# Patient Record
Sex: MALE | Race: WHITE | NOT HISPANIC OR LATINO | Employment: OTHER | URBAN - METROPOLITAN AREA
[De-identification: names, ages, dates, MRNs, and addresses within clinical notes are randomized per-mention and may not be internally consistent; named-entity substitution may affect disease eponyms.]

---

## 2017-03-10 DIAGNOSIS — C49.9 SARCOMA (H): Primary | ICD-10-CM

## 2017-04-04 ENCOUNTER — OFFICE VISIT (OUTPATIENT)
Dept: ORTHOPEDICS | Facility: CLINIC | Age: 62
End: 2017-04-04

## 2017-04-04 VITALS — WEIGHT: 177 LBS | HEIGHT: 68 IN | BODY MASS INDEX: 26.83 KG/M2

## 2017-04-04 DIAGNOSIS — C49.9 MYXOID LIPOSARCOMA (H): Primary | ICD-10-CM

## 2017-04-04 PROBLEM — I10 HYPERTENSION: Status: ACTIVE | Noted: 2017-04-04

## 2017-04-04 RX ORDER — DEXTROAMPHETAMINE SULFATE 15 MG/1
15 CAPSULE, EXTENDED RELEASE ORAL
COMMUNITY
Start: 2016-11-25 | End: 2023-08-23

## 2017-04-04 RX ORDER — TADALAFIL 20 MG/1
20 TABLET ORAL
COMMUNITY
Start: 2016-06-01

## 2017-04-04 RX ORDER — FINASTERIDE 5 MG/1
1.25 TABLET, FILM COATED ORAL DAILY
Status: ON HOLD | COMMUNITY
Start: 2016-04-06 | End: 2022-02-15

## 2017-04-04 NOTE — NURSING NOTE
"Reason For Visit:   Chief Complaint   Patient presents with     Results     Pt is here today to F/U on Same day CT Scan per Tumor Protocal S/p Excision of Tumor Bed Left Medial Thigh DOS: 11/21/13                   Ht 1.715 m (5' 7.52\")  Wt 80.3 kg (177 lb)  BMI 27.3 kg/m2                               Current Outpatient Prescriptions   Medication     tadalafil (CIALIS) 20 MG tablet     finasteride (PROSCAR) 5 MG tablet     dextroamphetamine (DEXEDRINE SPANSULE) 15 MG 24 hr capsule     Cholecalciferol (VITAMIN D3) 1000 UNITS CAPS     aspirin 81 MG tablet     IBUPROFEN PO     VITAMIN E MTC PO     Omega-3 Fatty Acids (OMEGA-3 FISH OIL PO)     Mesalamine (PENTASA PO)     atorvastatin (LIPITOR) 10 MG tablet     No current facility-administered medications for this visit.        No Known Allergies    Rama Miles C.M.A.             "

## 2017-04-04 NOTE — LETTER
4/4/2017       RE: Flaco Willis III  [address] [place] [address]     Dear Colleague,    Thank you for referring your patient, Flaco Willis III, to the [facility] at [facility]. Please see a copy of my visit note below.    DIAGNOSIS: myxoid liposarcoma, left proximal medial thigh, subcutaneous     TREATMENT: 1. surgical excision with positive margins (outside hospital), preoperative radiation ([facility]/Dr Castelan)   2. Tumor bed excision (11/21/12)    HISTORY OF PRESENT ILLNESS:  Mr. Willis is seen in routine followup today, he has no complaints.      PHYSICAL EXAMINATION:  On exam, there is no evidence of local recurrent tumor in the left adductor region or the left groin.      IMAGING:  Chest CT scan was reviewed by myself and I see no evidence of metastasis.  We will await the radiologist's final interpretation.      IMPRESSION:  Overall doing well.      PLAN:  He will be seen back for a final almost 5 year followup in 1 year.       Again, thank you for allowing me to participate in the care of your patient.      Sincerely,    Seymour Jesus MD

## 2017-04-04 NOTE — MR AVS SNAPSHOT
"              After Visit Summary   2017    Flaco Willis III    MRN: [id number]           Patient Information     Date Of Birth          1955        Visit Information        Provider Department      2017 4:00 PM Seymour Jesus MD [facility]        Today's Diagnoses     Myxoid liposarcoma (H)    -  1       Follow-ups after your visit        Who to contact     Please call your clinic at [phone] to:    Ask questions about your health    Make or cancel appointments    Discuss your medicines    Learn about your test results    Speak to your doctor   If you have compliments or concerns about an experience at your clinic, or if you wish to file a complaint, please contact [facility] Patient Relations at [phone] or email us at [email or web address]         Additional Information About Your Visit        MyChart Information     MTailor is an electronic gateway that provides easy, online access to your medical records. With MTailor, you can request a clinic appointment, read your test results, renew a prescription or communicate with your care team.     To sign up for Sellboxt visit the website at [email or web address]   You will be asked to enter the access code listed below, as well as some personal information. Please follow the directions to create your username and password.     Your access code is: [email or web address]  Expires: 2017  6:30 AM     Your access code will  in 90 days. If you need help or a new code, please contact your [facility] or call [phone] for assistance.        Care EveryWhere ID     This is your Care EveryWhere ID. This could be used by other organizations to access your [place] medical records  [id number]        Your Vitals Were     Height BMI (Body Mass Index)                1.715 m (5' 7.52\") 27.3 kg/m2           Blood Pressure from Last 3 Encounters:   13 " 118/83   09/21/13 (!) 142/98    Weight from Last 3 Encounters:   04/04/17 80.3 kg (177 lb)   03/22/16 78 kg (172 lb)   09/23/15 75.8 kg (167 lb)              Today, you had the following     No orders found for display         Today's Medication Changes          These changes are accurate as of: 4/4/17 11:59 PM.  If you have any questions, ask your nurse or doctor.               These medicines have changed or have updated prescriptions.        Dose/Directions    aspirin 81 MG tablet   This may have changed:  Another medication with the same name was removed. Continue taking this medication, and follow the directions you see here.   Changed by:  Seymour Jesus MD        Dose:  81 mg   Take 81 mg by mouth   Refills:  0         Stop taking these medicines if you haven't already. Please contact your care team if you have questions.     PROPECIA PO   Stopped by:  Seymour Jesus MD                    Primary Care Provider Office Phone # Fax #    Darnell Cui [phone]       [facility] [address]  [facility] [address]        Thank you!     Thank you for choosing [facility]  for your care. Our goal is always to provide you with excellent care. Hearing back from our patients is one way we can continue to improve our services. Please take a few minutes to complete the written survey that you may receive in the mail after your visit with us. Thank you!             Your Updated Medication List - Protect others around you: Learn how to safely use, store and throw away your medicines at www.disposemymeds.org.          This list is accurate as of: 4/4/17 11:59 PM.  Always use your most recent med list.                   Brand Name Dispense Instructions for use    aspirin 81 MG tablet      Take 81 mg by mouth       atorvastatin 10 MG tablet    LIPITOR     Take 10 mg by mouth At Bedtime       CIALIS 20 MG tablet   Generic drug:  tadalafil      Take 20 mg by mouth        dextroamphetamine 15 MG 24 hr capsule    DEXEDRINE SPANSULE     Take 15 mg by mouth       finasteride 5 MG tablet    PROSCAR     Take 1.25 mg by mouth       IBUPROFEN PO      Take 400 mg by mouth as needed for moderate pain       OMEGA-3 FISH OIL PO          PENTASA PO          vitamin D3 1000 UNITS Caps      Take 1,000 Units by mouth       VITAMIN E MTC PO

## 2017-04-04 NOTE — PROGRESS NOTES
DIAGNOSIS: myxoid liposarcoma, left proximal medial thigh, subcutaneous     TREATMENT: 1. surgical excision with positive margins (outside hospital), preoperative radiation ([facility]/Dr Castelan)   2. Tumor bed excision (11/21/12)    HISTORY OF PRESENT ILLNESS:  Mr. Willis is seen in routine followup today, he has no complaints.      PHYSICAL EXAMINATION:  On exam, there is no evidence of local recurrent tumor in the left adductor region or the left groin.      IMAGING:  Chest CT scan was reviewed by myself and I see no evidence of metastasis.  We will await the radiologist's final interpretation.      IMPRESSION:  Overall doing well.      PLAN:  He will be seen back for a final almost 5 year followup in 1 year.

## 2018-06-04 ENCOUNTER — TELEPHONE (OUTPATIENT)
Dept: ORTHOPEDICS | Facility: CLINIC | Age: 63
End: 2018-06-04

## 2018-06-04 NOTE — TELEPHONE ENCOUNTER
Patient called requesting scan be scheduled and also appointment with dr. kelly following scan.    Email was sent to alysia requesting task be taken care of.    Jailene rogel

## 2018-06-05 DIAGNOSIS — C49.9 SARCOMA OF SOFT TISSUE (H): Primary | ICD-10-CM

## 2018-07-10 ENCOUNTER — RADIANT APPOINTMENT (OUTPATIENT)
Dept: CT IMAGING | Facility: CLINIC | Age: 63
End: 2018-07-10
Attending: ORTHOPAEDIC SURGERY
Payer: COMMERCIAL

## 2018-07-10 ENCOUNTER — OFFICE VISIT (OUTPATIENT)
Dept: ORTHOPEDICS | Facility: CLINIC | Age: 63
End: 2018-07-10
Payer: COMMERCIAL

## 2018-07-10 VITALS — BODY MASS INDEX: 26.34 KG/M2 | WEIGHT: 170.8 LBS

## 2018-07-10 DIAGNOSIS — C49.9 SARCOMA OF SOFT TISSUE (H): ICD-10-CM

## 2018-07-10 DIAGNOSIS — C49.9 LIPOSARCOMA (H): Primary | ICD-10-CM

## 2018-07-10 PROBLEM — F98.8 ADD (ATTENTION DEFICIT DISORDER): Status: ACTIVE | Noted: 2017-06-28

## 2018-07-10 RX ORDER — ZOLPIDEM TARTRATE 10 MG/1
10 TABLET ORAL
Status: ON HOLD | COMMUNITY
Start: 2018-04-05 | End: 2022-02-18

## 2018-07-10 ASSESSMENT — ENCOUNTER SYMPTOMS: NERVOUS/ANXIOUS: 1

## 2018-07-10 NOTE — LETTER
7/10/2018       RE: Flaco Willis III  [address] [place] [address]     Dear Colleague,    Thank you for referring your patient, Flaco Willis III, to the [facility] at [facility]. Please see a copy of my visit note below.    DIAGNOSIS: myxoid liposarcoma, left proximal medial thigh, subcutaneous      TREATMENT: 1. surgical excision with positive margins (outside hospital), preoperative radiation ([facility]/Dr Castelan)   2. Tumor bed excision (11/21/12)    Patient is seen today for his 5 year follow-up.  No complaints.  He denies any changes in the left medial thigh.    On physical examination left medial thigh is supple with a healed wound and no evidence of recurrent masses.    Chest CT scan to my review shows no evidence of metastasis.    Impression: Overall doing well now 5 years post treatment of the soft tissue sarcoma.    Plan: 1. I discussed with Flaco the possibility of stopping his follow-up at this point to continue with annual chest x-rays for up to 5 additional years.  He denies preferred the latter.  2.  He will have follow-up annual chest x-ray for 5 years either at the emergency room physician's office.            Again, thank you for allowing me to participate in the care of your patient.      Sincerely,    Seymour Jesus MD

## 2018-07-10 NOTE — NURSING NOTE
Chief Complaint   Patient presents with     Results     F/u Same day Chest CT S/p Excision of Tumor Bed Left Medial Thigh DOS: 11/12/2013       63 year old  1955    Wt 77.5 kg (170 lb 12.8 oz)  BMI 26.34 kg/m2          [employer] [facility] [address] [place], [place] - [address] AT [facility] [address]    No Known Allergies  Current Outpatient Prescriptions   Medication     aspirin 81 MG tablet     atorvastatin (LIPITOR) 10 MG tablet     Cholecalciferol (VITAMIN D3) 1000 UNITS CAPS     dextroamphetamine (DEXEDRINE SPANSULE) 15 MG 24 hr capsule     finasteride (PROSCAR) 5 MG tablet     IBUPROFEN PO     Mesalamine (PENTASA PO)     Omega-3 Fatty Acids (FISH OIL PO)     Omega-3 Fatty Acids (OMEGA-3 FISH OIL PO)     tadalafil (CIALIS) 20 MG tablet     TADALAFIL PO     VITAMIN E MTC PO     zolpidem (AMBIEN) 10 MG tablet     No current facility-administered medications for this visit.

## 2018-07-10 NOTE — PROGRESS NOTES
DIAGNOSIS: myxoid liposarcoma, left proximal medial thigh, subcutaneous      TREATMENT: 1. surgical excision with positive margins (outside hospital), preoperative radiation ([facility]/Dr Castelan)   2. Tumor bed excision (11/21/12)    Patient is seen today for his 5 year follow-up.  No complaints.  He denies any changes in the left medial thigh.    On physical examination left medial thigh is supple with a healed wound and no evidence of recurrent masses.    Chest CT scan to my review shows no evidence of metastasis.    Impression: Overall doing well now 5 years post treatment of the soft tissue sarcoma.    Plan: 1. I discussed with Flaco the possibility of stopping his follow-up at this point to continue with annual chest x-rays for up to 5 additional years.  He denies preferred the latter.  2.  He will have follow-up annual chest x-ray for 5 years either at the emergency room physician's office.

## 2018-12-05 ENCOUNTER — PATIENT OUTREACH (OUTPATIENT)
Dept: CARE COORDINATION | Facility: CLINIC | Age: 63
End: 2018-12-05

## 2018-12-07 DIAGNOSIS — C49.9 SARCOMA (H): Primary | ICD-10-CM

## 2018-12-14 ENCOUNTER — OFFICE VISIT (OUTPATIENT)
Dept: ORTHOPEDICS | Facility: CLINIC | Age: 63
End: 2018-12-14
Payer: COMMERCIAL

## 2018-12-14 ENCOUNTER — TELEPHONE (OUTPATIENT)
Dept: ORTHOPEDICS | Facility: CLINIC | Age: 63
End: 2018-12-14

## 2018-12-14 DIAGNOSIS — C49.22 SOFT TISSUE SARCOMA OF LOWER EXTREMITY, LEFT (H): Primary | ICD-10-CM

## 2018-12-14 NOTE — PROGRESS NOTES
DIAGNOSIS: myxoid liposarcoma, left proximal medial thigh, subcutaneous      TREATMENT: 1. surgical excision with positive margins (outside hospital), preoperative radiation ([facility]/Dr Castelan)   2. Tumor bed excision (11/21/12)    Flaco is seen today as an unscheduled visit.  He is concerned seen by his tumor bed.  He has been reading certain information recently.  He is also had a family member who has struggled with metastatic sarcoma.  He reports concerns about tightness while performing yoga in the left thigh.  Through his palpation of the wound he is concerned about a local recurrence.    Physical exam he has obvious soft tissue defect over the site of tumor bed excision.  He has no obvious recurrent tumor though he does have some fullness just distal to the incision.    I answered all of Kennedy questions about his treatment and the nature of the surgery as well as the nature number.  What he can and cannot do.  He seemed satisfied with these explanations.    Impression: Patient suspects recurrence and has new symptoms in the left thigh.    Plan: MRI scan with the marker.  Will call patient with results.    Patient was seen for 15 minutes and greater than 10 minutes spent answering questions coordinating his care.

## 2018-12-14 NOTE — TELEPHONE ENCOUNTER
[facility] Call Center    Phone Message    May a detailed message be left on voicemail: yes    Reason for Call: Order(s)Is this a MRI of Femur and Thigh w/ w/o contrast or is this an arthrogram?  Reason for requested: Order received not clear  Date needed: Fax [phone] ASAP  Provider name: Dr. Jesus      Action Taken: Message routed to:  [facility] ump ortho

## 2018-12-14 NOTE — NURSING NOTE
Chief Complaint   Patient presents with     RECHECK     Pt. states that he is here today for increased pain in his surgical site. Pt. states that he does do some intense yoga stretches and may have some muscle pain.        63 year old  1955          [facility] - [place], [place] - [address] AT [facility] [address]    No Known Allergies    Current Outpatient Medications   Medication     aspirin 81 MG tablet     atorvastatin (LIPITOR) 10 MG tablet     Cholecalciferol (VITAMIN D3) 1000 UNITS CAPS     dextroamphetamine (DEXEDRINE SPANSULE) 15 MG 24 hr capsule     finasteride (PROSCAR) 5 MG tablet     IBUPROFEN PO     Mesalamine (PENTASA PO)     Omega-3 Fatty Acids (FISH OIL PO)     Omega-3 Fatty Acids (OMEGA-3 FISH OIL PO)     tadalafil (CIALIS) 20 MG tablet     TADALAFIL PO     VITAMIN E MTC PO     zolpidem (AMBIEN) 10 MG tablet     No current facility-administered medications for this visit.

## 2018-12-14 NOTE — LETTER
12/14/2018       RE: Flaco Willis III  [address] [place] [address]     Dear Colleague,    Thank you for referring your patient, Flaco Willis III, to the [facility] at [facility]. Please see a copy of my visit note below.    Chief Complaint:    HPI:     Physical Exam:    Imaging:    Impression:    Plan:    DIAGNOSIS: myxoid liposarcoma, left proximal medial thigh, subcutaneous      TREATMENT: 1. surgical excision with positive margins (outside hospital), preoperative radiation ([facility]/Dr Castelan)   2. Tumor bed excision (11/21/12)    Flaco is seen today as an unscheduled visit.  He is concerned seen by his tumor bed.  He has been reading certain information recently.  He is also had a family member who has struggled with metastatic sarcoma.  He reports concerns about tightness while performing yoga in the left thigh.  Through his palpation of the wound he is concerned about a local recurrence.    Physical exam he has obvious soft tissue defect over the site of tumor bed excision.  He has no obvious recurrent tumor though he does have some fullness just distal to the incision.    I answered all of Kennedy questions about his treatment and the nature of the surgery as well as the nature number.  What he can and cannot do.  He seemed satisfied with these explanations.    Impression: Patient suspects recurrence and has new symptoms in the left thigh.    Plan: MRI scan with the marker.  Will call patient with results.    Patient was seen for 15 minutes and greater than 10 minutes spent answering questions coordinating his care.    Again, thank you for allowing me to participate in the care of your patient.      Sincerely,    Seymour Jesus MD

## 2018-12-20 ENCOUNTER — TRANSFERRED RECORDS (OUTPATIENT)
Dept: HEALTH INFORMATION MANAGEMENT | Facility: CLINIC | Age: 63
End: 2018-12-20

## 2019-01-25 ENCOUNTER — TELEPHONE (OUTPATIENT)
Dept: ORTHOPEDICS | Facility: CLINIC | Age: 64
End: 2019-01-25

## 2019-01-26 NOTE — TELEPHONE ENCOUNTER
Spoke with Teto vogel: the results of his recent MRI scan. It showed no recurrent tumor in his right thigh.  He will return to see Dr. Jesus this summer with a new chest xray.

## 2021-11-18 ENCOUNTER — TRANSFERRED RECORDS (OUTPATIENT)
Dept: HEALTH INFORMATION MANAGEMENT | Facility: CLINIC | Age: 66
End: 2021-11-18
Payer: COMMERCIAL

## 2021-11-22 ENCOUNTER — TELEPHONE (OUTPATIENT)
Dept: ORTHOPEDICS | Facility: CLINIC | Age: 66
End: 2021-11-22
Payer: COMMERCIAL

## 2021-11-22 NOTE — TELEPHONE ENCOUNTER
[facility] Call Center    Phone Message:  Pt is a previous pt of MD Ann Marie.  Pt has found a NEW LUMP (pt stated) and would like someone from the team to CALL him BACK, in regards to his questions and concern.    May a detailed message be left on voicemail: Yes     Reason for Call: Other: Questions About A LUMP:  Call Back  (New Discovery of LUMP)    Action Taken: Message routed to:  [facility] Team    Travel Screening: Not Applicable

## 2021-11-23 NOTE — TELEPHONE ENCOUNTER
Called pt multiple times to gather more information. Will try again.         -Ricco, ATC- Orthopedics

## 2021-11-23 NOTE — TELEPHONE ENCOUNTER
[facility]    Phone Message    May a detailed message be left on voicemail: yes     Reason for Call: Other: Patient calling, stating that he is just looking for Dr Jesus's opinion. He has seen an ENT for lump on Neck. The ENT would like to do a needle biopsy. Patient is wanting to know what Dr Jesus thinks, given patient histoy. Would appreciate a call back.       Action Taken: Message routed to:  [facility] Ortho    Travel Screening: Not Applicable

## 2021-11-24 ENCOUNTER — TELEPHONE (OUTPATIENT)
Dept: ORTHOPEDICS | Facility: CLINIC | Age: 66
End: 2021-11-24
Payer: COMMERCIAL

## 2021-11-24 NOTE — TELEPHONE ENCOUNTER
RN called and left voice messge for Teto.  Please do biopsy with ENT.  If we need to see you we certainly will if it comes back same as your other tumor.  Either way, please call us and let us know what it is.

## 2021-11-24 NOTE — TELEPHONE ENCOUNTER
RN called and left voice message for Teto.  See below.  Please call and let us know what they found.  Please do biopsy with ENT.

## 2021-12-08 ENCOUNTER — TRANSFERRED RECORDS (OUTPATIENT)
Dept: HEALTH INFORMATION MANAGEMENT | Facility: CLINIC | Age: 66
End: 2021-12-08
Payer: COMMERCIAL

## 2021-12-29 ENCOUNTER — HOSPITAL ENCOUNTER (EMERGENCY)
Facility: CLINIC | Age: 66
Discharge: LEFT BEFORE DISCHARGE | End: 2021-12-29
Admitting: EMERGENCY MEDICINE
Payer: COMMERCIAL

## 2021-12-29 VITALS
RESPIRATION RATE: 18 BRPM | HEART RATE: 100 BPM | TEMPERATURE: 98.8 F | OXYGEN SATURATION: 98 % | SYSTOLIC BLOOD PRESSURE: 198 MMHG | DIASTOLIC BLOOD PRESSURE: 117 MMHG

## 2021-12-29 LAB
ALBUMIN SERPL-MCNC: 4.2 G/DL (ref 3.4–5)
ALP SERPL-CCNC: 85 U/L (ref 40–150)
ALT SERPL W P-5'-P-CCNC: 31 U/L (ref 0–70)
ANION GAP SERPL CALCULATED.3IONS-SCNC: 7 MMOL/L (ref 3–14)
AST SERPL W P-5'-P-CCNC: 14 U/L (ref 0–45)
BASOPHILS # BLD AUTO: 0.1 10E3/UL (ref 0–0.2)
BASOPHILS NFR BLD AUTO: 1 %
BILIRUB SERPL-MCNC: 1.2 MG/DL (ref 0.2–1.3)
BUN SERPL-MCNC: 11 MG/DL (ref 7–30)
CALCIUM SERPL-MCNC: 9 MG/DL (ref 8.5–10.1)
CHLORIDE BLD-SCNC: 108 MMOL/L (ref 94–109)
CO2 SERPL-SCNC: 22 MMOL/L (ref 20–32)
CREAT SERPL-MCNC: 0.82 MG/DL (ref 0.66–1.25)
EOSINOPHIL # BLD AUTO: 0.1 10E3/UL (ref 0–0.7)
EOSINOPHIL NFR BLD AUTO: 1 %
ERYTHROCYTE [DISTWIDTH] IN BLOOD BY AUTOMATED COUNT: 12 % (ref 10–15)
GFR SERPL CREATININE-BSD FRML MDRD: >90 ML/MIN/1.73M2
GLUCOSE BLD-MCNC: 119 MG/DL (ref 70–99)
HCT VFR BLD AUTO: 50.5 % (ref 40–53)
HGB BLD-MCNC: 17.3 G/DL (ref 13.3–17.7)
HOLD SPECIMEN: NORMAL
IMM GRANULOCYTES # BLD: 0 10E3/UL
IMM GRANULOCYTES NFR BLD: 0 %
LIPASE SERPL-CCNC: 118 U/L (ref 73–393)
LYMPHOCYTES # BLD AUTO: 1 10E3/UL (ref 0.8–5.3)
LYMPHOCYTES NFR BLD AUTO: 13 %
MCH RBC QN AUTO: 31.6 PG (ref 26.5–33)
MCHC RBC AUTO-ENTMCNC: 34.3 G/DL (ref 31.5–36.5)
MCV RBC AUTO: 92 FL (ref 78–100)
MONOCYTES # BLD AUTO: 0.8 10E3/UL (ref 0–1.3)
MONOCYTES NFR BLD AUTO: 10 %
NEUTROPHILS # BLD AUTO: 6.1 10E3/UL (ref 1.6–8.3)
NEUTROPHILS NFR BLD AUTO: 75 %
NRBC # BLD AUTO: 0 10E3/UL
NRBC BLD AUTO-RTO: 0 /100
PLATELET # BLD AUTO: 225 10E3/UL (ref 150–450)
POTASSIUM BLD-SCNC: 3.7 MMOL/L (ref 3.4–5.3)
PROT SERPL-MCNC: 7.8 G/DL (ref 6.8–8.8)
RBC # BLD AUTO: 5.47 10E6/UL (ref 4.4–5.9)
SODIUM SERPL-SCNC: 137 MMOL/L (ref 133–144)
TROPONIN I SERPL HS-MCNC: 9 NG/L
WBC # BLD AUTO: 8.1 10E3/UL (ref 4–11)

## 2021-12-29 PROCEDURE — 36415 COLL VENOUS BLD VENIPUNCTURE: CPT | Performed by: EMERGENCY MEDICINE

## 2021-12-29 PROCEDURE — 85004 AUTOMATED DIFF WBC COUNT: CPT | Performed by: EMERGENCY MEDICINE

## 2021-12-29 PROCEDURE — 80053 COMPREHEN METABOLIC PANEL: CPT | Performed by: EMERGENCY MEDICINE

## 2021-12-29 PROCEDURE — 999N000104 HC STATISTIC NO CHARGE

## 2021-12-29 PROCEDURE — 84484 ASSAY OF TROPONIN QUANT: CPT | Performed by: EMERGENCY MEDICINE

## 2021-12-29 PROCEDURE — 83690 ASSAY OF LIPASE: CPT | Performed by: EMERGENCY MEDICINE

## 2021-12-29 NOTE — ED TRIAGE NOTES
"\"Lump on neck they can radiate it, they want to do a pet scan, to make sure there's not cancer al over of the body. now my stomach under my rib cage it hurts, they put me on BuSpar and Ativan, I googled it and they said they don't take with acholol, maybe because all the stress I think I developed an ulcer, I am super wand up\"    "

## 2021-12-31 ENCOUNTER — TELEPHONE (OUTPATIENT)
Dept: ORTHOPEDICS | Facility: CLINIC | Age: 66
End: 2021-12-31
Payer: COMMERCIAL

## 2021-12-31 NOTE — TELEPHONE ENCOUNTER
[facility] Call [facility]    Phone Message    May a detailed message be left on voicemail: yes     Reason for Call: Other: Patient is returning Dixies call, please call Edward back when you are able.      Action Taken: Message routed to:  [facility] Ortho    Travel Screening: Not Applicable

## 2022-01-04 ENCOUNTER — TRANSFERRED RECORDS (OUTPATIENT)
Dept: HEALTH INFORMATION MANAGEMENT | Facility: CLINIC | Age: 67
End: 2022-01-04
Payer: COMMERCIAL

## 2022-01-07 ENCOUNTER — TELEPHONE (OUTPATIENT)
Dept: ORTHOPEDICS | Facility: CLINIC | Age: 67
End: 2022-01-07

## 2022-01-07 NOTE — TELEPHONE ENCOUNTER
"Spoke with patient, Ermelinda RN is out of the office this week.  Patient had a leg biopsy and treatment about 10 years ago with Dr Jesus and nurse Longoria.    Recently he had a soft tissue growth on his neck and this was biopsied at [facility] and it came back positive for cancer.  They told him it is at stage 1 and as a part of the treatment plan with Dr Stewart he will have radiation and a few treatments of chemotherapy.    He had a PET scan this week on Tuesday and just had the results today and stated it was \"good news\".  He is calling to see if there is any way that Dr Jesus would be willing to make sure he is doing the right thing.  He knows the team at [facility] is fantastic, but he always trusted Dr Jesus and he really wants to make sure he is double checking about this.    He is comfortable with RN sending message to nurse Wadsworth.  He stated that he had spoken with Ermelinda at end of 2021 about this so she was aware at the time. He will continue with treatment plan at [facility] for now. Sandhya Beverly RN    "

## 2022-01-07 NOTE — TELEPHONE ENCOUNTER
[facility] Call [facility]    Phone Message    May a detailed message be left on voicemail: yes     Reason for Call: Other: Patient would like a call back from Ermelinda to discuss an current person issue, and would like Dr Jesus's recommendation      Action Taken: Message routed to:  [facility] Ortho    Travel Screening: Not Applicable

## 2022-01-10 NOTE — TELEPHONE ENCOUNTER
RN returned call to Teto.  He is wanting the opinion of Dr. Jesus on treatment for his newly diagnosed:     Final Diagnosis     Left neck lymph node, ultrasound-guided core biopsy with touch preparation-Positive for malignancy, favor p16+ metastatic squamous cell carcinoma (see comment).     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   CISplatin in sodium chloride 0.9 % IV infusion

## 2022-01-12 ENCOUNTER — TELEPHONE (OUTPATIENT)
Dept: ORTHOPEDICS | Facility: CLINIC | Age: 67
End: 2022-01-12
Payer: COMMERCIAL

## 2022-01-12 NOTE — TELEPHONE ENCOUNTER
----- Message from Seymour Jesus MD sent at 1/10/2022  3:10 PM CST -----  I trust the recommendation of the doctors at [facility]. I am not exert in this area. If he has doubts we can set up a second opinion for him.  Seymour  ----- Message -----  From: Ermelinda Farfan, RN  Sent: 1/10/2022   1:24 PM CST  To: Seymour Jesus MD      RN returned call to Teto.  He is wanting the opinion of Dr. Jesus on treatment for his newly diagnosed:     Cut and pasted from Care Everywhere   [facility]  Final Diagnosis    Left neck lymph node, ultrasound-guided core biopsy with touch preparation-Positive for malignancy, favor p16+ metastatic squamous cell carcinoma (see comment).    Current Oncology Plans  [facility], Dr. 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   CISplatin in sodium chloride 0.9 % IV infusion      He is wanting your thoughts on this.  Does chemo sound reasonable, he said 3 rounds just because he was on the cusp, and Radiation.    Thanks  Ermelinda

## 2022-01-14 ENCOUNTER — TRANSCRIBE ORDERS (OUTPATIENT)
Dept: OTHER | Age: 67
End: 2022-01-14
Payer: COMMERCIAL

## 2022-01-14 DIAGNOSIS — C43.4 MELANOMA OF NECK (H): Primary | ICD-10-CM

## 2022-01-17 ENCOUNTER — PATIENT OUTREACH (OUTPATIENT)
Dept: ONCOLOGY | Facility: CLINIC | Age: 67
End: 2022-01-17
Payer: COMMERCIAL

## 2022-01-17 NOTE — PROGRESS NOTES
I called Teto and left him a detailed vm.  He had called in as a self referral asking to consult with Dr. Pack.  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 Pack as I know she does not typically consult with H&N cancer patients.  Her response which I left on Teto's vm:  I don't see head and necks, Dr Friedman would see head and neck and better yet Dr Malik deals with melanoma patients so im sure either or. But tell the patient his friends  are sly unfortunately thatis not my area of expertise     I also asked Teto if he has ever been seen by ENT and was told surgery is not an option.  I would also like to recommend he see one of our ENT doctors first.  I asked if he could please call me to discuss his case further and I can help him with any appointment scheduling needs.  My contact information was left.

## 2022-01-17 NOTE — PROGRESS NOTES
Teto returned my call.  He clarified a few things for me:  1.  He has seen an ENT:  ~[facility]  ~NO surgery recommended.    2.  He would like to see one of the doctors that Dr. Pack suggested.  He lives in [place] so wants something close by, if possible.  I have him scheduled with Dr. Friedman.    3.  Confirmed he has a tonsil cancer.  Per records appears SCC.    New Patient Oncology Nurse Navigator Note     Referring provider: self referral & Dr. Pack     Referring Clinic/Organization: self     Referred to (specialty): medical oncology    Requested provider (if applicable): Dr. Friedman     Date Referral Received: 1/14/2022     Evaluation for : tonsil ca     Clinical History (per Nurse review of records provided):    11/16/2021:  CT soft tissue neck--[facility]  12/8/2021:  US neck/head soft tissue--[facility]  12/8/2021:  US  Biopsy soft tissue/muscle-[facility]  Final Diagnosis     Left neck lymph node, ultrasound-guided core biopsy with touch preparation-Positive for malignancy, favor p16+ metastatic squamous cell carcinoma (see comment).   Electronically signed by José Antonio Caba Jr., MD on 12/9/2021 at 12:51 PM   Comment     The cytologic touch preps are positive for malignancy. Only a tiny fragment of malignancy remains on the tissue core biopsies and this focus appears positive for P 40 and P 16, suggesting metastatic P 16 positive squamous cell carcinoma. Due to the very limited malignant material on the core biopsy, this finding should be interpreted with caution and correlated clinically.     1/4/2022:  CT Chest/Thorax-[facility]  1/4/2022:  PET CT-[facility]      1/6/2022:  Sommer Schulte-Medical Oncology  [facility]    Clinical Assessment / Barriers to Care (Per Nurse): none     Records Location (Care Everywhere, Media, etc.): [facility], [facility] ([facility] and [facility])     Records Needed:   [facility]:  Dr. Jermaine Saucedo  consult note  2.  Imaging pushed to PACS:  11/16/2021:  CT soft tissue neck--[facility]  12/8/2021:  US neck/head soft tissue--[facility]  12/8/2021:  US  Biopsy soft tissue/muscle  1/4/2022:  CT Chest/Thorax-[facility]  1/4/2022:  PET CT-[facility]         Additional testing needed prior to consult: none

## 2022-01-18 NOTE — TELEPHONE ENCOUNTER
RECORDS STATUS - ALL OTHER DIAGNOSIS      RECORDS RECEIVED FROM: Jignesh [facility], [facility], [facility]   DATE RECEIVED:    NOTES STATUS DETAILS   OFFICE NOTE from referring provider SELF    OFFICE NOTE from medical oncologist     OFFICE NOTE from other specialist External: Renetta DAMIAN 21DrCaitlin Jermaineeugenio Saucedo   DISCHARGE SUMMARY from hospital     DISCHARGE REPORT from the ER     OPERATIVE REPORT     MEDICATION LIST [facility]- [facility]    CLINICAL TRIAL TREATMENTS TO DATE     LABS     PATHOLOGY REPORTS Slides req  [facility]- [facility]  [employer] 21: [id number]   ANYTHING RELATED TO DIAGNOSIS [facility]- [facility] Most recent labs 22   GENONOMIC TESTING     TYPE:     IMAGING (NEED IMAGES & REPORT)     CT SCANS PACS 21: CT Soft Tissue Neck   MRI     MAMMO     ULTRASOUND PACS 21: US Neck/ Head soft tissue  21: US Biopsy soft tissue/muscle   PET       Action 2022 9:44 AM ABT   Action Taken Called and unable to speak to patient in regards to MAT for [facility]. [facility] for call back    10:50 AM  Patient called back and MAT was sent to [email or web address]    12:30 PM  MAT from patient received and sent with records request to [facility]. Images from [facility] and [facility] received and resolved to PACS    2:52 PM  Records from [facility] received and sent to HIM for upload     Action 2022 4:26 PM ABT   Action Taken Sent over request to [facility] for 22 PET Tumor Eval

## 2022-01-19 ENCOUNTER — VIRTUAL VISIT (OUTPATIENT)
Dept: ONCOLOGY | Facility: CLINIC | Age: 67
End: 2022-01-19
Attending: INTERNAL MEDICINE
Payer: COMMERCIAL

## 2022-01-19 ENCOUNTER — PRE VISIT (OUTPATIENT)
Dept: ONCOLOGY | Facility: CLINIC | Age: 67
End: 2022-01-19
Payer: COMMERCIAL

## 2022-01-19 DIAGNOSIS — C10.9 MALIGNANT NEOPLASM OF OROPHARYNX (H): Primary | ICD-10-CM

## 2022-01-19 DIAGNOSIS — C49.9 LIPOSARCOMA (H): ICD-10-CM

## 2022-01-19 PROCEDURE — 99205 OFFICE O/P NEW HI 60 MIN: CPT | Mod: 95 | Performed by: INTERNAL MEDICINE

## 2022-01-19 PROCEDURE — G0463 HOSPITAL OUTPT CLINIC VISIT: HCPCS | Mod: PN,RTG | Performed by: INTERNAL MEDICINE

## 2022-01-19 RX ORDER — FINASTERIDE 1 MG/1
1 TABLET, FILM COATED ORAL DAILY
COMMUNITY
Start: 2021-09-29

## 2022-01-19 RX ORDER — LORAZEPAM 0.5 MG/1
.5-1 TABLET ORAL
Status: ON HOLD | COMMUNITY
Start: 2022-01-14 | End: 2022-02-18

## 2022-01-19 RX ORDER — BUSPIRONE HYDROCHLORIDE 10 MG/1
10 TABLET ORAL 2 TIMES DAILY
COMMUNITY
Start: 2021-12-30

## 2022-01-19 RX ORDER — ATORVASTATIN CALCIUM 20 MG/1
20 TABLET, FILM COATED ORAL DAILY
COMMUNITY
Start: 2021-12-28

## 2022-01-19 NOTE — LETTER
1/19/2022         RE: Flaco Willis III  [address] [place] [address]        Dear Colleague,    Thank you for referring your patient, Flaco Willis III, to the [facility]. Please see a copy of my visit note below.     [facility]    NEW PATIENT VISIT NOTE    PATIENT NAME: Flaco Willis III MRN # [id number]  DATE OF VISIT: January 19, 2022 YOB: 1955    REFERRING PROVIDER: Referred Jeffery, MD  No address on file    CANCER TYPE: HPV+ve oropharyngeal cancer       HISTORY OF PRESENT ILLNESS     He notes that he has been good health. He had a little lump by inside groin. He was found to have cancer which was resected by Dr. David Jesus. He was monitored for 5 yrs and discharged. He had a normal physical exam last September. He had been a weekend smoker in the past. He drinks beer on weekends. He recently noted a lump in his left neck. He presented to his PCP. He was referred to ENT surgery. It was hard to get in to ENT clinic. He had a CT scan which revealed lymph node enlargement. He had needle biopsy which revealed HPV +ve squamous cell cancer. He had a follow up PET-CT scan done which revealed focal FDG uptake within the left palatine tonsil without a discrete CT correlate in addition to enlarged FDG avid left level 2A cervical lymph node.     He has no pain. He is in creative business, does video. This cancer diagnosis has caused him lot of stress. He has no other health issues. He gets irritable bowel and has been treated with prednisone burst by Dr. Morocho in [facility].     He denies hypertension, diabetes mellitus type II, coronary artery disease, CVA.     He has normal hearing.       PAST MEDICAL HISTORY     Past Medical History:   Diagnosis Date     ADD (attention deficit disorder)      Anxiety      Crohn's disease (H)      Hyperlipidemia      Hypertension      Liposarcoma of lower extremity (H)           CURRENT OUTPATIENT  MEDICATIONS     Current Outpatient Medications   Medication Sig     aspirin 81 MG tablet Take 81 mg by mouth     atorvastatin (LIPITOR) 20 MG tablet Take 20 mg by mouth daily     busPIRone (BUSPAR) 10 MG tablet      finasteride (PROSCAR) 5 MG tablet Take 1.25 mg by mouth     LORazepam (ATIVAN) 0.5 MG tablet Take 0.5-1 mg by mouth     Omega-3 Fatty Acids (FISH OIL PO)      tadalafil (CIALIS) 20 MG tablet Take 20 mg by mouth     zolpidem (AMBIEN) 10 MG tablet Take 10 mg by mouth     atorvastatin (LIPITOR) 10 MG tablet Take 10 mg by mouth At Bedtime  (Patient not taking: Reported on 1/19/2022)     Cholecalciferol (VITAMIN D3) 1000 UNITS CAPS Take 1,000 Units by mouth (Patient not taking: Reported on 1/19/2022)     dextroamphetamine (DEXEDRINE SPANSULE) 15 MG 24 hr capsule Take 15 mg by mouth (Patient not taking: Reported on 1/19/2022)     finasteride (PROPECIA) 1 MG tablet Take 1 mg by mouth (Patient not taking: Reported on 1/19/2022)     IBUPROFEN PO Take 400 mg by mouth as needed for moderate pain (Patient not taking: Reported on 1/19/2022)     Mesalamine (PENTASA PO)  (Patient not taking: Reported on 1/19/2022)     Omega-3 Fatty Acids (OMEGA-3 FISH OIL PO)  (Patient not taking: Reported on 1/19/2022)     TADALAFIL PO 1/2-1 tabs 1 hour before activity (Patient not taking: Reported on 1/19/2022)     VITAMIN E MTC PO  (Patient not taking: Reported on 1/19/2022)     No current facility-administered medications for this visit.        ALLERGIES      Allergies   Allergen Reactions     Augmentin Rash        SOCIAL HISTORY     Social History     Social History Narrative     Not on file          FAMILY HISTORY   - paternal grand father had some form of cancer (unknown type)  - both parents had CAD - father was chronic smoker     REVIEW OF SYSTEMS   As above in the HPI, o/w complete 12-point ROS was negative.     PHYSICAL EXAM   B/P: Data Unavailable, T: Data Unavailable, P: Data Unavailable, R: Data  Unavailable  @LASTSAO2(4)@  Wt Readings from Last 3 Encounters:   07/10/18 77.5 kg (170 lb 12.8 oz)   04/04/17 80.3 kg (177 lb)   03/22/16 78 kg (172 lb)     GEN: NAD  HEENT: PERRL, EOMI, no icterus, injection or pallor. Oropharynx is clear.  NECK: no cervical or supraclavicular lymphadenopathy  LUNGS: clear bilaterally  CV: regular, no murmurs, rubs, or gallops  ABDOMEN: soft, non-tender, non-distended, normal bowel sounds, no hepatosplenomegaly by percussion or palpation  EXT: warm, well perfused, no edema  NEURO: alert  SKIN: no rashes     LABORATORY AND IMAGING STUDIES     Recent Labs   Lab Test 12/29/21  1805      POTASSIUM 3.7   CHLORIDE 108   CO2 22   ANIONGAP 7   BUN 11   CR 0.82   *   KATIUSKA 9.0     No results for input(s): MAG, PHOS in the last 44878 hours.  Recent Labs   Lab Test 12/29/21  1805   WBC 8.1   HGB 17.3      MCV 92   NEUTROPHIL 75     Recent Labs   Lab Test 12/29/21  1805   BILITOTAL 1.2   ALKPHOS 85   ALT 31   AST 14   ALBUMIN 4.2         Narrative  Performed by TEST  EXAM: PET METABOLIC TUMOR EVALUATION     DATE:  1/4/2022 12:41 PM     COMPARISON: Left neck ultrasound and ultrasound-guided biopsy 12/8/2021.     ADDITIONAL CLINICAL DATA:  Lymph node biopsy favoring squamous cell carcinoma. 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       CLINICAL DATA:  R22.1 Localized swelling, mass and lump, neck       TECHNIQUE: Following intravenous administration of 76-viyofv-4-deoxyglucose (FDG) and a standard uptake, the patient was imaged on a whole body PET and CT scanner.  Multiple bed position acquisitions were obtained by the PET scanner, and contiguous images were obtained by the CT scanner along the length of the patient's body from the top of the head to the  level of the mid thighs.     FDG dose: 8.41 millicuries administered intravenously.     BLOOD GLUCOSE:  118 mg/dl.     BMI:  25.5     FINDINGS:   There is normal physiologic uptake in  the brain, myocardium, gastrointestinal tract, and genitourinary tract.     HEAD/NECK:     NODES:  Approximately 2 cm FDG avid left level 2A cervical lymph node, previously biopsied. No additional enlarged or FDG avid cervical lymph nodes.     MASSES:  There is a small focus of asymmetric FDG uptake in the left palatine tonsil with no discrete CT correlate.     CHEST:   MEDIASTINUM/JORGE/AXILLA:  No pathologically enlarged or FDG avid axillary, mediastinal, or hilar lymph nodes. Aortic and coronary atherosclerosis.     LUNGS:  No suspicious pulmonary nodule or focal FDG uptake.       PLEURA: Unremarkable.     ABDOMEN/PELVIS:   HEPATOBILIARY:  No contour deforming hepatic mass or focal FDG uptake. Cholelithiasis.     PANCREAS:  Unremarkable.     SPLEEN:  Unremarkable.     ADRENAL GLANDS:  Unremarkable.       KIDNEYS:  Unremarkable.     BLADDER:  Unremarkable.     BOWEL:  No focal bowel wall thickening or focal FDG uptake. Scattered noninflamed colonic diverticula.     LYMPH NODES:  No pathologically enlarged or FDG avid lymph nodes.     AORTA:  Mild aortic atherosclerosis. No aneurysm.     PERITONEAL CAVITY:  No discrete mass or focal FDG uptake. No free fluid.     ABDOMINAL WALL:   Unremarkable.     BONES:  No suspicious focal lesion or focal FDG uptake.  IMPRESSION:   1. Focal FDG uptake within the left palatine tonsil without a discrete CT correlate. This may represent the patient's primary head and neck neoplasm.     2. Enlarged FDG avid left level 2A cervical lymph node corresponds to the previously biopsied lymph node. No additional cervical lymphadenopathy.     3. No evidence of distant metastatic disease within the chest, abdomen, or pelvis.     Report signed by Aravind Zapien MD      ASSESSMENT    - Left oropharyngeal cancer; biopsy positive left level 2a node  - No medical comorbidity    DISCUSSION   I had a lengthy discussion with Flaco at this visit. He has been in good health other than his neck  swelling which has been biopsied to be squamous cell cancer positive for HPV. He only smokes cigar occasionally. His PET-CT scan did show uptake in left tonsil in addition to the enlarged level 2A node which has been biopsied positive.     He has been recommended radiation with or without chemotherapy as per NCCN recommendations. I do agree that HPV+ve patients fare a lot better than most patients with smoking related squamous cell cancers. Patients with HPV+ve disease in smokers have intermediate prognosis. He would have less than 10 pack year smoking and would be closer to never smoker. Several trials are trying to establish optimal therapy de-escalation for HPV+ve disease. I have not had a chance to review his PET-CT, though his node is visible over video visit and not very subtle. Primary site has not been biopsied. I do strongly feel that he has to be assessed by our oncologic ENT surgeons. He could benefit from tonsillectomy to establish primary with or without neck dissection. In fact, I would not be opposed to idea of neck dissection. In the absence of high risk factors radiation alone would be adequate for him.      Radiation therapy has a better functional outcome as compared to surgery though for smaller lesions can be resected transoral robotically. Concurrent chemotherapy with radiation therapy will be the standard for his oropharyngeal cancer.    Radiation therapy is the primary backbone of definitive treatment. The role of chemotherapy in this setting is to sensitize radiation therapy. We reviewed in detail the two approved frontline agents; cisplatin, carboplatin with paclitaxel and cetuximab. Cisplatin which is given intravenously once every three weeks along with the radiation therapy has activity against a broad range of cancers. Cisplatin causes a lot of nausea, vomiting, can diminish hearing, lead to renal insufficiency and peripheral neuropathy. It can suppress blood counts - including white  blood cells, red blood cells and platelets. This might put him at risk for serious life threatening infection and he should report every incidence of fever with temperature >100.4 the same day.     I briefly reviewed the options of weekly cisplatin, cetuximab or carboplatin and paclitaxel which are chosen in platinum ineligible patients. We do not have head to head comparisons but platinum is considered the optimal radiation sensitizer across tumor types (except anal cancer where 5FU + mitomycin is preferred). Weekly cisplatin avoids the huge peaks with cisplatin 3 weekly dose. Weekly carboplatin and paclitaxel are very well tolerated and the only toxicity is myelosuppression.   Cetuximab is a monoclonal antibody to EGFR (epidermal growth factor receptor). Cetuximab has been shown to be highly efficacious along with concurrent radiation therapy as a radiation sensitizer. Despite its toxicity, cisplatin continues to be the standard of care for concurrent chemoradiation therapy. I would recommend weekly cisplatin which is much better tolerated than 3 weekly dose. He had concerns of hair loss, xerostomia, change in appearance and long term toxicities. Most side effects from chemotherapy would be reversible, though I have had some patients with persistent hearing loss, occasionally with neuropathy. The bigger challenges are with dry mouth and loss of taste secondary to radiation. We discussed side effects of radiation therapy including mucositis, decreased salivary secretions, hypothyroidism. This would again be reinforced at patient visits in Radiation Oncology. He is a creative person / artist and musician and would not like to have peripheral neuropathy.   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 David Jesus. He was discharged after no evidence of disease after 5 yrs. I do not feel that the 2 tumors are related. It is incidental that he had 2 primaries.  Cancer is a fairly common occurrences and we infrequently do see patients with more than one primaries.     PLAN   I do have access to his CT scan of neck but not to his PET-CT scan. I will get actual images uploaded to our system  I would refer him to our ENT surgeons and have evaluation done by them  I would have his case reviewed at our tumor board.   He was worried about time delay. I agree with avoiding any avoidable delays in care but it is more important to choose the right treatment path for him.   He is open to having evaluation done at [facility]. He would like to have care at [facility] if he could get equivalent care there. I would like to have him follow our oncologic ENT surgeons who work with team of speech pathologist and a number of ancillary service providers.     60 minutes spent on the date of the encounter doing chart review, history and exam, documentation and further activities as noted above        Ashok Friedman  Hematologist and Medical Oncologist  [facility]

## 2022-01-19 NOTE — PROGRESS NOTES
Flaco is a 66 year old who is being evaluated via a billable video visit.      How would you like to obtain your AVS? MyChart  If the video visit is dropped, the invitation should be resent by: Text to cell phone: [phone]  Will anyone else be joining your video visit? Kimberly Richardson

## 2022-01-19 NOTE — PROGRESS NOTES
[facility] CANCER CLINIC    NEW PATIENT VISIT NOTE    PATIENT NAME: Flaco Willis III MRN # [id number]  DATE OF VISIT: January 19, 2022 YOB: 1955    REFERRING PROVIDER: Referred Self, MD  No address on file    CANCER TYPE: HPV+ve oropharyngeal cancer       HISTORY OF PRESENT ILLNESS     He notes that he has been good health. He had a little lump by inside groin. He was found to have cancer which was resected by Dr. David Jesus. He was monitored for 5 yrs and discharged. He had a normal physical exam last September. He had been a weekend smoker in the past. He drinks beer on weekends. He recently noted a lump in his left neck. He presented to his PCP. He was referred to ENT surgery. It was hard to get in to ENT clinic. He had a CT scan which revealed lymph node enlargement. He had needle biopsy which revealed HPV +ve squamous cell cancer. He had a follow up PET-CT scan done which revealed focal FDG uptake within the left palatine tonsil without a discrete CT correlate in addition to enlarged FDG avid left level 2A cervical lymph node.     He has no pain. He is in [employer] business, does video. This cancer diagnosis has caused him lot of stress. He has no other health issues. He gets irritable bowel and has been treated with prednisone burst by Dr. Morocho in [place] [facility].     He denies hypertension, diabetes mellitus type II, coronary artery disease, CVA.     He has normal hearing.       PAST MEDICAL HISTORY     Past Medical History:   Diagnosis Date     ADD (attention deficit disorder)      Anxiety      Crohn's disease (H)      Hyperlipidemia      Hypertension      Liposarcoma of lower extremity (H)           CURRENT OUTPATIENT MEDICATIONS     Current Outpatient Medications   Medication Sig     aspirin 81 MG tablet Take 81 mg by mouth     atorvastatin (LIPITOR) 20 MG tablet Take 20 mg by mouth daily     busPIRone (BUSPAR) 10 MG tablet      finasteride (PROSCAR) 5 MG tablet Take 1.25  mg by mouth     LORazepam (ATIVAN) 0.5 MG tablet Take 0.5-1 mg by mouth     Omega-3 Fatty Acids (FISH OIL PO)      tadalafil (CIALIS) 20 MG tablet Take 20 mg by mouth     zolpidem (AMBIEN) 10 MG tablet Take 10 mg by mouth     atorvastatin (LIPITOR) 10 MG tablet Take 10 mg by mouth At Bedtime  (Patient not taking: Reported on 1/19/2022)     Cholecalciferol (VITAMIN D3) 1000 UNITS CAPS Take 1,000 Units by mouth (Patient not taking: Reported on 1/19/2022)     dextroamphetamine (DEXEDRINE SPANSULE) 15 MG 24 hr capsule Take 15 mg by mouth (Patient not taking: Reported on 1/19/2022)     finasteride (PROPECIA) 1 MG tablet Take 1 mg by mouth (Patient not taking: Reported on 1/19/2022)     IBUPROFEN PO Take 400 mg by mouth as needed for moderate pain (Patient not taking: Reported on 1/19/2022)     Mesalamine (PENTASA PO)  (Patient not taking: Reported on 1/19/2022)     Omega-3 Fatty Acids (OMEGA-3 FISH OIL PO)  (Patient not taking: Reported on 1/19/2022)     TADALAFIL PO 1/2-1 tabs 1 hour before activity (Patient not taking: Reported on 1/19/2022)     VITAMIN E MTC PO  (Patient not taking: Reported on 1/19/2022)     No current facility-administered medications for this visit.        ALLERGIES      Allergies   Allergen Reactions     Augmentin Rash        SOCIAL HISTORY     Social History     Social History Narrative     Not on file          FAMILY HISTORY   - paternal grand father had some form of cancer (unknown type)  - both parents had CAD - father was chronic smoker     REVIEW OF SYSTEMS   As above in the HPI, o/w complete 12-point ROS was negative.     PHYSICAL EXAM   B/P: Data Unavailable, T: Data Unavailable, P: Data Unavailable, R: Data Unavailable  @LASTSAO2(4)@  Wt Readings from Last 3 Encounters:   07/10/18 77.5 kg (170 lb 12.8 oz)   04/04/17 80.3 kg (177 lb)   03/22/16 78 kg (172 lb)     GEN: NAD  HEENT: PERRL, EOMI, no icterus, injection or pallor. Oropharynx is clear.  NECK: no cervical or supraclavicular  lymphadenopathy  LUNGS: clear bilaterally  CV: regular, no murmurs, rubs, or gallops  ABDOMEN: soft, non-tender, non-distended, normal bowel sounds, no hepatosplenomegaly by percussion or palpation  EXT: warm, well perfused, no edema  NEURO: alert  SKIN: no rashes     LABORATORY AND IMAGING STUDIES     Recent Labs   Lab Test 12/29/21  1805      POTASSIUM 3.7   CHLORIDE 108   CO2 22   ANIONGAP 7   BUN 11   CR 0.82   *   KATIUSKA 9.0     No results for input(s): MAG, PHOS in the last 84362 hours.  Recent Labs   Lab Test 12/29/21  1805   WBC 8.1   HGB 17.3      MCV 92   NEUTROPHIL 75     Recent Labs   Lab Test 12/29/21  1805   BILITOTAL 1.2   ALKPHOS 85   ALT 31   AST 14   ALBUMIN 4.2         Narrative  Performed by TEST  EXAM: PET METABOLIC TUMOR EVALUATION     DATE:  1/4/2022 12:41 PM     COMPARISON: Left neck ultrasound and ultrasound-guided biopsy 12/8/2021.     ADDITIONAL CLINICAL DATA:  Lymph node biopsy favoring squamous cell carcinoma. 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       CLINICAL DATA:  R22.1 Localized swelling, mass and lump, neck       TECHNIQUE: Following intravenous administration of 26-vfkmyl-3-deoxyglucose (FDG) and a standard uptake, the patient was imaged on a whole body PET and CT scanner.  Multiple bed position acquisitions were obtained by the PET scanner, and contiguous images were obtained by the CT scanner along the length of the patient's body from the top of the head to the  level of the mid thighs.     FDG dose: 8.41 millicuries administered intravenously.     BLOOD GLUCOSE:  118 mg/dl.     BMI:  25.5     FINDINGS:   There is normal physiologic uptake in the brain, myocardium, gastrointestinal tract, and genitourinary tract.     HEAD/NECK:     NODES:  Approximately 2 cm FDG avid left level 2A cervical lymph node, previously biopsied. No additional enlarged or FDG avid cervical lymph nodes.     MASSES:  There is a small focus of  asymmetric FDG uptake in the left palatine tonsil with no discrete CT correlate.     CHEST:   MEDIASTINUM/JORGE/AXILLA:  No pathologically enlarged or FDG avid axillary, mediastinal, or hilar lymph nodes. Aortic and coronary atherosclerosis.     LUNGS:  No suspicious pulmonary nodule or focal FDG uptake.       PLEURA: Unremarkable.     ABDOMEN/PELVIS:   HEPATOBILIARY:  No contour deforming hepatic mass or focal FDG uptake. Cholelithiasis.     PANCREAS:  Unremarkable.     SPLEEN:  Unremarkable.     ADRENAL GLANDS:  Unremarkable.       KIDNEYS:  Unremarkable.     BLADDER:  Unremarkable.     BOWEL:  No focal bowel wall thickening or focal FDG uptake. Scattered noninflamed colonic diverticula.     LYMPH NODES:  No pathologically enlarged or FDG avid lymph nodes.     AORTA:  Mild aortic atherosclerosis. No aneurysm.     PERITONEAL CAVITY:  No discrete mass or focal FDG uptake. No free fluid.     ABDOMINAL WALL:   Unremarkable.     BONES:  No suspicious focal lesion or focal FDG uptake.  IMPRESSION:   1. Focal FDG uptake within the left palatine tonsil without a discrete CT correlate. This may represent the patient's primary head and neck neoplasm.     2. Enlarged FDG avid left level 2A cervical lymph node corresponds to the previously biopsied lymph node. No additional cervical lymphadenopathy.     3. No evidence of distant metastatic disease within the chest, abdomen, or pelvis.     Report signed by Aravind Zapien MD      ASSESSMENT    - Left oropharyngeal cancer; biopsy positive left level 2a node  - No medical comorbidity    DISCUSSION   I had a lengthy discussion with Flaco at this visit. He has been in good health other than his neck swelling which has been biopsied to be squamous cell cancer positive for HPV. He only smokes cigar occasionally. His PET-CT scan did show uptake in left tonsil in addition to the enlarged level 2A node which has been biopsied positive.     He has been recommended radiation with or  without chemotherapy as per NCCN recommendations. I do agree that HPV+ve patients fare a lot better than most patients with smoking related squamous cell cancers. Patients with HPV+ve disease in smokers have intermediate prognosis. He would have less than 10 pack year smoking and would be closer to never smoker. Several trials are trying to establish optimal therapy de-escalation for HPV+ve disease. I have not had a chance to review his PET-CT, though his node is visible over video visit and not very subtle. Primary site has not been biopsied. I do strongly feel that he has to be assessed by our oncologic ENT surgeons. He could benefit from tonsillectomy to establish primary with or without neck dissection. In fact, I would not be opposed to idea of neck dissection. In the absence of high risk factors radiation alone would be adequate for him.      Radiation therapy has a better functional outcome as compared to surgery though for smaller lesions can be resected transoral robotically. Concurrent chemotherapy with radiation therapy will be the standard for his oropharyngeal cancer.    Radiation therapy is the primary backbone of definitive treatment. The role of chemotherapy in this setting is to sensitize radiation therapy. We reviewed in detail the two approved frontline agents; cisplatin, carboplatin with paclitaxel and cetuximab. Cisplatin which is given intravenously once every three weeks along with the radiation therapy has activity against a broad range of cancers. Cisplatin causes a lot of nausea, vomiting, can diminish hearing, lead to renal insufficiency and peripheral neuropathy. It can suppress blood counts - including white blood cells, red blood cells and platelets. This might put him at risk for serious life threatening infection and he should report every incidence of fever with temperature >100.4 the same day.     I briefly reviewed the options of weekly cisplatin, cetuximab or carboplatin and  paclitaxel which are chosen in platinum ineligible patients. We do not have head to head comparisons but platinum is considered the optimal radiation sensitizer across tumor types (except anal cancer where 5FU + mitomycin is preferred). Weekly cisplatin avoids the huge peaks with cisplatin 3 weekly dose. Weekly carboplatin and paclitaxel are very well tolerated and the only toxicity is myelosuppression.   Cetuximab is a monoclonal antibody to EGFR (epidermal growth factor receptor). Cetuximab has been shown to be highly efficacious along with concurrent radiation therapy as a radiation sensitizer. Despite its toxicity, cisplatin continues to be the standard of care for concurrent chemoradiation therapy. I would recommend weekly cisplatin which is much better tolerated than 3 weekly dose. He had concerns of hair loss, xerostomia, change in appearance and long term toxicities. Most side effects from chemotherapy would be reversible, though I have had some patients with persistent hearing loss, occasionally with neuropathy. The bigger challenges are with dry mouth and loss of taste secondary to radiation. We discussed side effects of radiation therapy including mucositis, decreased salivary secretions, hypothyroidism. This would again be reinforced at patient visits in Radiation Oncology. He is a creative person / artist and musician and would not like to have peripheral neuropathy.   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 David Jesus. He was discharged after no evidence of disease after 5 yrs. I do not feel that the 2 tumors are related. It is incidental that he had 2 primaries. Cancer is a fairly common occurrences and we infrequently do see patients with more than one primaries.     PLAN   I do have access to his CT scan of neck but not to his PET-CT scan. I will get actual images uploaded to our system  I would refer him to our ENT surgeons and have  evaluation done by them  I would have his case reviewed at our tumor board.   He was worried about time delay. I agree with avoiding any avoidable delays in care but it is more important to choose the right treatment path for him.   He is open to having evaluation done at [facility]. He would like to have care at [facility] if he could get equivalent care there. I would like to have him follow our oncologic ENT surgeons who work with team of speech pathologist and a number of ancillary service providers.       Video-Visit Details    Type of service:  Video Visit  Originating Location (pt. Location): Home  Distant Location (provider location):  [facility] [place]   Platform used for Video Visit: Farseer    60 minutes spent on the date of the encounter doing chart review, history and exam, documentation and further activities as noted above      Ashok Friedman    Hematologist and Medical Oncologist  [facility]

## 2022-01-20 ENCOUNTER — TELEPHONE (OUTPATIENT)
Dept: ORTHOPEDICS | Facility: CLINIC | Age: 67
End: 2022-01-20
Payer: COMMERCIAL

## 2022-01-20 NOTE — TELEPHONE ENCOUNTER
FUTURE VISIT INFORMATION      FUTURE VISIT INFORMATION:    Date: 1/26/22    Time: 8:20AM    [place] [facility] [place] INFORMATION:    Referring provider:   Ashok Friedman MD    Referring providers clinic:  [facility] [place]     Reason for visit/diagnosis  Malignant neoplasm of oropharynx     RECORDS REQUESTED FROM:       Clinic name Comments Records Status Imaging Status   [facility]  1/19/22 note from Ashok Friedman MD   EPIC    [facility] imaging  1/4/22 CT Chest and PET   12/8/21 US FNA and US Head NEck   11/16/21 CT NECK  Care everywhere  PACS   [facility]ORATORY  [address] [place] [address]  Phone: [phone] 12/8/21  Lymph node (specify site), US/  Left neck  (Case: [id number]  ) Requested by Onc CSS on 1/19/22, received 1/20/22    [facility] 11/18/21 note from Dr Shavonne Saucedo scanned in EPIC

## 2022-01-20 NOTE — TELEPHONE ENCOUNTER
RN called and spoke with Teto.  He is gong to see Dr. Friedman for his treatment.  He already had a visit with him and just wanted to let us know this rather than [facility].

## 2022-01-21 ENCOUNTER — PATIENT OUTREACH (OUTPATIENT)
Dept: ONCOLOGY | Facility: CLINIC | Age: 67
End: 2022-01-21
Payer: COMMERCIAL

## 2022-01-21 NOTE — PROGRESS NOTES
TC to pt returning his call regarding making contact with writer so he has connected with his RNCC. Discussed with pt that his case is still under review and that writer will be reaching out at a later date once a plan is in place for tx. Provided pt with writer's contact information, encouraged him to sign up for My Chart, and to call writer with any further questions or concerns. Pt stated understanding of all and agreed to call clinic PRN until writer reaches out.

## 2022-01-23 NOTE — PROGRESS NOTES
Dear Dr. Friedman:    I had the pleasure of meeting Flaco Willis III in consultation today at the [facility] at your request.     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 He developed a left neck mass which was evaluated by his PCP and then referred to his local ENT. He had a CT neck performed on 11/16/2021 which showed a 2.5 x 2.0 cm neck mass. He had an U/S of the neck performed on 12/8/2021 which showed a 3.1 x 2.3 x 1.7 cm left neck mass.  He had an FNA of the neck mass on 12/8/2021 which showed p16+ SCC. He had a PET scan on 1/4/2022 which showed focal uptake in the left tonsil with no obvious CT correlate, 2 cm FDG avid left level IIA node, no distant disease. He saw medical oncology at [facility] and was recommended for radiation +/- chemotherapy, recommended against surgery due to the need for postoperative chemoradiation. He then transferred his care to the [facility]. He saw Dr Friedman on 1/19/2022 who discussed various treatment options with him.     He is very anxious about the diagnosis. He has not noticed a change in size of the mass. He has no pain from the neck mass. He has no sore throat. He has no difficulty swallowing without pain. He has lost weight related to stress. He has no ear pain.     He saw the dentist on 1/19/2022 for cleaning. He recently had a dental extraction, supposed to have an implant but has been delayed due to the cancer diagnosis.         Past medical history: irritable bowel, hyperlipidemia, anxiety, myxosarcoma treated with surgery and radiation    Past surgical history: excision of myxosarcoma, hernia repair    Social history: Smoked on weekends previously several years ago. No chewing tobacco. Drinks beer on weekends. . 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     Family history: No H&N cancer    MEDICATIONS:     Current Outpatient Medications    Medication Sig Dispense Refill     aspirin 81 MG tablet Take 81 mg by mouth       atorvastatin (LIPITOR) 20 MG tablet Take 20 mg by mouth daily       busPIRone (BUSPAR) 10 MG tablet        escitalopram (LEXAPRO) 10 MG tablet Take 10 mg by mouth daily       finasteride (PROSCAR) 5 MG tablet Take 1.25 mg by mouth       IBUPROFEN PO Take 400 mg by mouth as needed for moderate pain        Omega-3 Fatty Acids (FISH OIL PO)        tadalafil (CIALIS) 20 MG tablet Take 20 mg by mouth       atorvastatin (LIPITOR) 10 MG tablet Take 10 mg by mouth At Bedtime  (Patient not taking: Reported on 1/19/2022)       Cholecalciferol (VITAMIN D3) 1000 UNITS CAPS Take 1,000 Units by mouth (Patient not taking: Reported on 1/19/2022)       dextroamphetamine (DEXEDRINE SPANSULE) 15 MG 24 hr capsule Take 15 mg by mouth (Patient not taking: Reported on 1/19/2022)       finasteride (PROPECIA) 1 MG tablet Take 1 mg by mouth (Patient not taking: Reported on 1/19/2022)       LORazepam (ATIVAN) 0.5 MG tablet Take 0.5-1 mg by mouth (Patient not taking: Reported on 1/26/2022)       Mesalamine (PENTASA PO)  (Patient not taking: Reported on 1/19/2022)       Omega-3 Fatty Acids (OMEGA-3 FISH OIL PO)  (Patient not taking: Reported on 1/19/2022)       TADALAFIL PO 1/2-1 tabs 1 hour before activity (Patient not taking: Reported on 1/19/2022)       VITAMIN E MTC PO  (Patient not taking: Reported on 1/19/2022)       zolpidem (AMBIEN) 10 MG tablet Take 10 mg by mouth (Patient not taking: Reported on 1/26/2022)         ALLERGIES:    Allergies   Allergen Reactions     Augmentin Rash       HABITS/SOCIAL HISTORY:   Smoked on weekends previously several years ago. No chewing tobacco. Drinks beer on weekends.   .   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     Social History     Socioeconomic History     Marital status:      Spouse name: Not on file     Number of children: Not on file     Years of education: Not on file      "Highest education level: Not on file   Occupational History     Not on file   Tobacco Use     Smoking status: Former Smoker     Types: Cigars     Start date: 1973     Quit date: 1990     Years since quittin.0     Smokeless tobacco: Never Used   Substance and Sexual Activity     Alcohol use: Yes     Drug use: No     Sexual activity: Yes     Partners: Female     Birth control/protection: Pull-out method   Other Topics Concern     Not on file   Social History Narrative     Not on file     Social Determinants of Health     Financial Resource Strain: Not on file   Food Insecurity: Not on file   Transportation Needs: Not on file   Physical Activity: Not on file   Stress: Not on file   Social Connections: Not on file   Intimate Partner Violence: Not on file   Housing Stability: Not on file       PAST MEDICAL HISTORY:   Past Medical History:   Diagnosis Date     ADD (attention deficit disorder)      Anxiety      Crohn's disease (H)      Hyperlipidemia      Hypertension      Liposarcoma of lower extremity (H)         PAST SURGICAL HISTORY:   Past Surgical History:   Procedure Laterality Date     EXCISE SOFT TISSUE TUMOR THIGH  2013    Procedure: EXCISE SOFT TISSUE TUMOR THIGH;  Excision of Tumor Bed Left Medial Thigh;  Surgeon: Seymour Jesus MD;  Location: [facility]     HERNIA REPAIR       tumor removal of thigh[       vocal cord surgery[       WRIST SURGERY         FAMILY HISTORY:  No family history on file.    REVIEW OF SYSTEMS:  12 point ROS was negative other than the symptoms noted above in the HPI.  Patient Supplied Answers to Review of Systems  No flowsheet data found.      PHYSICAL EXAMINATION:   BP (!) 170/113 (BP Location: Right arm, Patient Position: Sitting, Cuff Size: Adult Regular)   Pulse 94   Temp 98.1  F (36.7  C) (Temporal)   Ht 1.727 m (5' 8\")   Wt 75.3 kg (166 lb)   BMI 25.24 kg/m     Appearance:   normal; NAD, age-appropriate appearance, well-developed, normal habitus "   Communication:   normal; communicates verbally, normal voice quality   Head/Face:   inspection -  Normal; no scars or visible lesions   Salivary glands -  Normal size, no tenderness, swelling, or palpable masses   Facial strength -  Normal and symmetric   Skin:  normal, no rash   Ears:  auricle (AD) -  normal  EAC (AD) -  cerumen  TM (AD) -  Unable to visualize  auricle (AS) -  normal  EAC (AS) -  normal  TM (AS) -  Normal, no effusion  Normal clinical speech reception   Nose:  Ext. inspection -  Normal  Internal Inspection -  Normal mucosa, septum, and turbinates   Nasopharynx:  normal mucosa, no masses   Oral Cavity:  lips -  Normal mucosa, oral competence, and stoma size   Age-appropriate dentition, healthy gingival mucosa   Hard palate, buccal, floor of mouth mucosa normal   Tongue - normal movement, no lesions   Oropharynx:  mucosa -  Normal, no lesions  soft palate -  Normal, no lesions, no asymmetry, normal elevation  tonsils -  Small, left slightly greater than right, left tonsil with firm nodularity on palpation   Hypopharynx:  Normal pyriform sinus and pharyngeal wall mucosa   No pooled secretions    Larynx:  Epiglottis, AE folds, false vocal cords, true vocal cords, arytenoids normal in appearance, bilaterally mobile cords    Neck: No visible mass or asymmetry   Left level II mass about 2-2.5 cm in size, mobile, no overlying skin involvement, nontender  Normal range of motion   Lymphatic:  no abnormal nodes   Cardiovascular:  warm, pink, well-perfused extremities without swelling, tenderness, or edema   Respiratory:  Normal respiratory effort, no stridor   Neuro/Psych.:  mood/affect -  anxious  mental status -  normal       PROCEDURES:   Flexible fiberoptic laryngoscopy: Scope exam was indicated due to tonsil cancer. Verbal consent was obtained. The nasal cavity was prepped with an aerosolized solution of topical anesthetic and vasoconstrictive agent. The scope was passed through the anterior nasal  cavity and advanced. Inspection of the nasopharynx revealed no gross abnormality. The base of tongue and vallecula are normal. The epiglottis, AE folds, false cords, true cords, arytenoids are normal.  Inspection of the larynx revealed bilaterally mobile vocal cords. Pyriform sinuses are symmetric. The airway is patent. Procedure tolerated well with no immediate complications noted.              RESULTS REVIEWED:   I reviewed the note from Dr Friedman, PET scan report, CT neck report, path report (not yet reviewed at [place])    I independently reviewed the CT neck and PET scan images  *CT neck from 11/2021 shows some asymmetry of the left tonsil, well defined left neck node     Care discussed with SLP      IMPRESSION AND PLAN: *  Flaco Willis is a 66 year old man with a p16+ T1N1 SCC of the left tonsil.    We discussed surgical and nonsurgical treatment of these tumors and the overall favorable prognosis for the patient.    Surgical treatment would be with a left radical tonsillectomy, left neck dissection, NG tube placement. I discussed with him that if the pathology is favorable he could potentially require no further treatment. We discussed that multiple positive nodes or other adverse pathologic features could push us to recommend postoperative radiation. If he had positive margins or IMANI on the node then he would be recommended for postoperative chemoradiation. If he did elect for surgery I would likely obtain a new contrasted CT neck to assess for interval change since the last contrasted CT.     I discussed that we will need to review his case at tumor board but if he elected for nonsurgical therapy he may very well be able to receive radiation alone rather than chemoradiation. I do have some concerns about him receiving chemotherapy with the potential impacts that the side effects could have on his quality of life related to his job, namely the potential hearing loss and neuropathy.     He does state that he  is more interested in nonsurgical therapy with radiation. We discussed locations for treatment and my recommendations. A referral was placed to Dr Malone in radiation oncology. We will work on getting him an appointment within the next week.     He will need to undergo pre-radiation dental clearance if he needs radiation. The dental handout was provided today.    He should have a baseline audiogram prior to treatment if he has chemotherapy.    He should have a baseline video swallow study if he has nonsurgical therapy. He was seen by SLP today.    Port and PEG were not yet discussed by oncology. The swallow study and evaluation by radiation will be important with determining a PEG plan if he has radiation. If he were to require PEG/port his airway is safe for undergoing general anesthesia.    We did spend considerable time discussing the impact of this diagnosis on his mental health and specifically the anxiety he is having. He is already on antidepressant/anxiolytic. A behavioral health referral was placed.     We will review his case at tumor board and uptake on final recommendations.    Thank you very much for the opportunity to participate in the care of your patient.      Summer Valladares MD  Otolaryngology- Head & Neck Surgery      This note was dictated with voice recognition software and then edited. Please excuse any unintentional errors.         I spent over an hour on the patient visit (excluding procedures) and charting activities on the date of service.         CC:  Ashok Friedman MD  Department of Medical Oncology  [facility]        Alcides Malone MD  Department of Radiation Oncology  [facility]

## 2022-01-25 ENCOUNTER — LAB (OUTPATIENT)
Dept: LAB | Facility: CLINIC | Age: 67
End: 2022-01-25
Payer: COMMERCIAL

## 2022-01-25 DIAGNOSIS — C10.9 SQUAMOUS CELL CARCINOMA OF OROPHARYNX (H): Primary | ICD-10-CM

## 2022-01-25 LAB
PATH REPORT.COMMENTS IMP SPEC: NORMAL
PATH REPORT.FINAL DX SPEC: NORMAL
PATH REPORT.GROSS SPEC: NORMAL
PATH REPORT.MICROSCOPIC SPEC OTHER STN: NORMAL
PATH REPORT.RELEVANT HX SPEC: NORMAL
PATH REPORT.SITE OF ORIGIN SPEC: NORMAL

## 2022-01-25 PROCEDURE — 88321 CONSLTJ&REPRT SLD PREP ELSWR: CPT | Performed by: PATHOLOGY

## 2022-01-26 ENCOUNTER — OFFICE VISIT (OUTPATIENT)
Dept: OTOLARYNGOLOGY | Facility: CLINIC | Age: 67
End: 2022-01-26
Payer: COMMERCIAL

## 2022-01-26 ENCOUNTER — PRE VISIT (OUTPATIENT)
Dept: OTOLARYNGOLOGY | Facility: CLINIC | Age: 67
End: 2022-01-26

## 2022-01-26 ENCOUNTER — ALLIED HEALTH/NURSE VISIT (OUTPATIENT)
Dept: SPEECH THERAPY | Facility: CLINIC | Age: 67
End: 2022-01-26

## 2022-01-26 VITALS
DIASTOLIC BLOOD PRESSURE: 113 MMHG | BODY MASS INDEX: 25.16 KG/M2 | TEMPERATURE: 98.1 F | HEIGHT: 68 IN | WEIGHT: 166 LBS | HEART RATE: 94 BPM | SYSTOLIC BLOOD PRESSURE: 170 MMHG

## 2022-01-26 DIAGNOSIS — C10.9 MALIGNANT NEOPLASM OF OROPHARYNX (H): Primary | ICD-10-CM

## 2022-01-26 DIAGNOSIS — F32.9 REACTIVE DEPRESSION: ICD-10-CM

## 2022-01-26 DIAGNOSIS — C77.0 SECONDARY MALIGNANCY OF LYMPH NODES OF HEAD, FACE AND NECK (H): ICD-10-CM

## 2022-01-26 PROCEDURE — 99205 OFFICE O/P NEW HI 60 MIN: CPT | Mod: 25 | Performed by: OTOLARYNGOLOGY

## 2022-01-26 PROCEDURE — 31575 DIAGNOSTIC LARYNGOSCOPY: CPT | Performed by: OTOLARYNGOLOGY

## 2022-01-26 RX ORDER — ESCITALOPRAM OXALATE 10 MG/1
10 TABLET ORAL DAILY
COMMUNITY
End: 2023-08-23

## 2022-01-26 ASSESSMENT — MIFFLIN-ST. JEOR: SCORE: 1497.47

## 2022-01-26 ASSESSMENT — PATIENT HEALTH QUESTIONNAIRE - PHQ9: SUM OF ALL RESPONSES TO PHQ QUESTIONS 1-9: 9

## 2022-01-26 ASSESSMENT — PAIN SCALES - GENERAL: PAINLEVEL: NO PAIN (0)

## 2022-01-26 NOTE — LETTER
1/26/2022      RE: Flaco Willis III  [address] [place] [address]       Dear Dr. Friedman:    I had the pleasure of meeting Flaco Willis III in consultation today at the [facility] at your request.     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 He developed a left neck mass which was evaluated by his PCP and then referred to his local ENT. He had a CT neck performed on 11/16/2021 which showed a 2.5 x 2.0 cm neck mass. He had an U/S of the neck performed on 12/8/2021 which showed a 3.1 x 2.3 x 1.7 cm left neck mass.  He had an FNA of the neck mass on 12/8/2021 which showed p16+ SCC. He had a PET scan on 1/4/2022 which showed focal uptake in the left tonsil with no obvious CT correlate, 2 cm FDG avid left level IIA node, no distant disease. He saw medical oncology at [facility] and was recommended for radiation +/- chemotherapy, recommended against surgery due to the need for postoperative chemoradiation. He then transferred his care to the [place]. He saw Dr Friedman on 1/19/2022 who discussed various treatment options with him.     He is very anxious about the diagnosis. He has not noticed a change in size of the mass. He has no pain from the neck mass. He has no sore throat. He has no difficulty swallowing without pain. He has lost weight related to stress. He has no ear pain.     He saw the dentist on 1/19/2022 for cleaning. He recently had a dental extraction, supposed to have an implant but has been delayed due to the cancer diagnosis.         Past medical history: irritable bowel, hyperlipidemia, anxiety, myxosarcoma treated with surgery and radiation    Past surgical history: excision of myxosarcoma, hernia repair    Social history: Smoked on weekends previously several years ago. No chewing tobacco. Drinks beer on weekends. . 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     Family  history: No H&N cancer    MEDICATIONS:     Current Outpatient Medications   Medication Sig Dispense Refill     aspirin 81 MG tablet Take 81 mg by mouth       atorvastatin (LIPITOR) 20 MG tablet Take 20 mg by mouth daily       busPIRone (BUSPAR) 10 MG tablet        escitalopram (LEXAPRO) 10 MG tablet Take 10 mg by mouth daily       finasteride (PROSCAR) 5 MG tablet Take 1.25 mg by mouth       IBUPROFEN PO Take 400 mg by mouth as needed for moderate pain        Omega-3 Fatty Acids (FISH OIL PO)        tadalafil (CIALIS) 20 MG tablet Take 20 mg by mouth       atorvastatin (LIPITOR) 10 MG tablet Take 10 mg by mouth At Bedtime  (Patient not taking: Reported on 1/19/2022)       Cholecalciferol (VITAMIN D3) 1000 UNITS CAPS Take 1,000 Units by mouth (Patient not taking: Reported on 1/19/2022)       dextroamphetamine (DEXEDRINE SPANSULE) 15 MG 24 hr capsule Take 15 mg by mouth (Patient not taking: Reported on 1/19/2022)       finasteride (PROPECIA) 1 MG tablet Take 1 mg by mouth (Patient not taking: Reported on 1/19/2022)       LORazepam (ATIVAN) 0.5 MG tablet Take 0.5-1 mg by mouth (Patient not taking: Reported on 1/26/2022)       Mesalamine (PENTASA PO)  (Patient not taking: Reported on 1/19/2022)       Omega-3 Fatty Acids (OMEGA-3 FISH OIL PO)  (Patient not taking: Reported on 1/19/2022)       TADALAFIL PO 1/2-1 tabs 1 hour before activity (Patient not taking: Reported on 1/19/2022)       VITAMIN E MTC PO  (Patient not taking: Reported on 1/19/2022)       zolpidem (AMBIEN) 10 MG tablet Take 10 mg by mouth (Patient not taking: Reported on 1/26/2022)         ALLERGIES:    Allergies   Allergen Reactions     Augmentin Rash       HABITS/SOCIAL HISTORY:   Smoked on weekends previously several years ago. No chewing tobacco. Drinks beer on weekends.   .   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     Social History     Socioeconomic History     Marital status:      Spouse name: Not on file      "Number of children: Not on file     Years of education: Not on file     Highest education level: Not on file   Occupational History     Not on file   Tobacco Use     Smoking status: Former Smoker     Types: Cigars     Start date: 1973     Quit date: 1990     Years since quittin.0     Smokeless tobacco: Never Used   Substance and Sexual Activity     Alcohol use: Yes     Drug use: No     Sexual activity: Yes     Partners: Female     Birth control/protection: Pull-out method   Other Topics Concern     Not on file   Social History Narrative     Not on file     Social Determinants of Health     Financial Resource Strain: Not on file   Food Insecurity: Not on file   Transportation Needs: Not on file   Physical Activity: Not on file   Stress: Not on file   Social Connections: Not on file   Intimate Partner Violence: Not on file   Housing Stability: Not on file       PAST MEDICAL HISTORY:   Past Medical History:   Diagnosis Date     ADD (attention deficit disorder)      Anxiety      Crohn's disease (H)      Hyperlipidemia      Hypertension      Liposarcoma of lower extremity (H)         PAST SURGICAL HISTORY:   Past Surgical History:   Procedure Laterality Date     EXCISE SOFT TISSUE TUMOR THIGH  2013    Procedure: EXCISE SOFT TISSUE TUMOR THIGH;  Excision of Tumor Bed Left Medial Thigh;  Surgeon: Seymour Jesus MD;  Location: [facility]     HERNIA REPAIR       tumor removal of thigh[       vocal cord surgery[       WRIST SURGERY         FAMILY HISTORY:  No family history on file.    REVIEW OF SYSTEMS:  12 point ROS was negative other than the symptoms noted above in the HPI.  Patient Supplied Answers to Review of Systems  No flowsheet data found.      PHYSICAL EXAMINATION:   BP (!) 170/113 (BP Location: Right arm, Patient Position: Sitting, Cuff Size: Adult Regular)   Pulse 94   Temp 98.1  F (36.7  C) (Temporal)   Ht 1.727 m (5' 8\")   Wt 75.3 kg (166 lb)   BMI 25.24 kg/m     Appearance:   normal; " NAD, age-appropriate appearance, well-developed, normal habitus   Communication:   normal; communicates verbally, normal voice quality   Head/Face:   inspection -  Normal; no scars or visible lesions   Salivary glands -  Normal size, no tenderness, swelling, or palpable masses   Facial strength -  Normal and symmetric   Skin:  normal, no rash   Ears:  auricle (AD) -  normal  EAC (AD) -  cerumen  TM (AD) -  Unable to visualize  auricle (AS) -  normal  EAC (AS) -  normal  TM (AS) -  Normal, no effusion  Normal clinical speech reception   Nose:  Ext. inspection -  Normal  Internal Inspection -  Normal mucosa, septum, and turbinates   Nasopharynx:  normal mucosa, no masses   Oral Cavity:  lips -  Normal mucosa, oral competence, and stoma size   Age-appropriate dentition, healthy gingival mucosa   Hard palate, buccal, floor of mouth mucosa normal   Tongue - normal movement, no lesions   Oropharynx:  mucosa -  Normal, no lesions  soft palate -  Normal, no lesions, no asymmetry, normal elevation  tonsils -  Small, left slightly greater than right, left tonsil with firm nodularity on palpation   Hypopharynx:  Normal pyriform sinus and pharyngeal wall mucosa   No pooled secretions    Larynx:  Epiglottis, AE folds, false vocal cords, true vocal cords, arytenoids normal in appearance, bilaterally mobile cords    Neck: No visible mass or asymmetry   Left level II mass about 2-2.5 cm in size, mobile, no overlying skin involvement, nontender  Normal range of motion   Lymphatic:  no abnormal nodes   Cardiovascular:  warm, pink, well-perfused extremities without swelling, tenderness, or edema   Respiratory:  Normal respiratory effort, no stridor   Neuro/Psych.:  mood/affect -  anxious  mental status -  normal       PROCEDURES:   Flexible fiberoptic laryngoscopy: Scope exam was indicated due to tonsil cancer. Verbal consent was obtained. The nasal cavity was prepped with an aerosolized solution of topical anesthetic and  vasoconstrictive agent. The scope was passed through the anterior nasal cavity and advanced. Inspection of the nasopharynx revealed no gross abnormality. The base of tongue and vallecula are normal. The epiglottis, AE folds, false cords, true cords, arytenoids are normal.  Inspection of the larynx revealed bilaterally mobile vocal cords. Pyriform sinuses are symmetric. The airway is patent. Procedure tolerated well with no immediate complications noted.              RESULTS REVIEWED:   I reviewed the note from Dr Friedman, PET scan report, CT neck report, path report (not yet reviewed at [place])    I independently reviewed the CT neck and PET scan images  *CT neck from 11/2021 shows some asymmetry of the left tonsil, well defined left neck node     Care discussed with SLP      IMPRESSION AND PLAN: *  Flaco Willis is a 66 year old man with a p16+ T1N1 SCC of the left tonsil.    We discussed surgical and nonsurgical treatment of these tumors and the overall favorable prognosis for the patient.    Surgical treatment would be with a left radical tonsillectomy, left neck dissection, NG tube placement. I discussed with him that if the pathology is favorable he could potentially require no further treatment. We discussed that multiple positive nodes or other adverse pathologic features could push us to recommend postoperative radiation. If he had positive margins or IMANI on the node then he would be recommended for postoperative chemoradiation. If he did elect for surgery I would likely obtain a new contrasted CT neck to assess for interval change since the last contrasted CT.     I discussed that we will need to review his case at tumor board but if he elected for nonsurgical therapy he may very well be able to receive radiation alone rather than chemoradiation. I do have some concerns about him receiving chemotherapy with the potential impacts that the side effects could have on his quality of life related to his job,  namely the potential hearing loss and neuropathy.     He does state that he is more interested in nonsurgical therapy with radiation. We discussed locations for treatment and my recommendations. A referral was placed to Dr Malone in radiation oncology. We will work on getting him an appointment within the next week.     He will need to undergo pre-radiation dental clearance if he needs radiation. The dental handout was provided today.    He should have a baseline audiogram prior to treatment if he has chemotherapy.    He should have a baseline video swallow study if he has nonsurgical therapy. He was seen by SLP today.    Port and PEG were not yet discussed by oncology. The swallow study and evaluation by radiation will be important with determining a PEG plan if he has radiation. If he were to require PEG/port his airway is safe for undergoing general anesthesia.    We did spend considerable time discussing the impact of this diagnosis on his mental health and specifically the anxiety he is having. He is already on antidepressant/anxiolytic. A behavioral health referral was placed.     We will review his case at tumor board and uptake on final recommendations.    Thank you very much for the opportunity to participate in the care of your patient.      Summer Valladares MD  Otolaryngology- Head & Neck Surgery      This note was dictated with voice recognition software and then edited. Please excuse any unintentional errors.         I spent over an hour on the patient visit (excluding procedures) and charting activities on the date of service.     CC:  Ashok Friedman MD  Department of Medical Oncology  [facility]    Alcides Malone MD  Department of Radiation Oncology  [facility]

## 2022-01-26 NOTE — PATIENT INSTRUCTIONS
1. We will call you on Friday after tumor board    2. Please call the ENT clinic with any questions,concerns, new or worsening symptoms.    -Clinic number is [phone]   - Lesa's direct line (Dr. Valladares's nurse) [phone]

## 2022-01-26 NOTE — NURSING NOTE
"Chief Complaint   Patient presents with     Consult     referred by Dr. Friedman       Blood pressure (!) 170/113, pulse 94, temperature 98.1  F (36.7  C), temperature source Temporal, height 1.727 m (5' 8\"), weight 75.3 kg (166 lb).    Risa Givens, EMT    "

## 2022-01-26 NOTE — PROGRESS NOTES
Flaco Willis III was seen for allied health care provider visit for introduction of self and SLP services. Provided information on trajectory of care and benefits of SLP services during radiation or after surgery pending pt's medical plan of care formalization. Speaking is currently highly functional. Swallowing is currently function as pt reports not difficulties with swallowing. Formal swallow evaluation indicated after surgery or video swallow study as a baseline if he opts for radiation therapy. Attempted to provided pt with formal education on swallowing during and after radiation however he repeatedly declined to participate. He did ask multiple questions in regards to changes during and after radiation but declined formal education/training. All questions answered within scope of practice for pt. Encouraged pt to reach out with any questions or concerns. Time spent with patient 15 minutes.    Saloni Ventura MS, CCC-SLP  Speech-Language Pathology  [facility]  Department of Otolaryngology/D&T - 4th floor  Pager: [phone]  Phone: [phone]  Email: [email or web address]

## 2022-01-28 ENCOUNTER — OFFICE VISIT (OUTPATIENT)
Dept: RADIATION ONCOLOGY | Facility: CLINIC | Age: 67
End: 2022-01-28
Attending: OTOLARYNGOLOGY
Payer: COMMERCIAL

## 2022-01-28 ENCOUNTER — TUMOR CONFERENCE (OUTPATIENT)
Dept: ONCOLOGY | Facility: CLINIC | Age: 67
End: 2022-01-28
Payer: COMMERCIAL

## 2022-01-28 VITALS
SYSTOLIC BLOOD PRESSURE: 168 MMHG | WEIGHT: 167 LBS | BODY MASS INDEX: 25.39 KG/M2 | DIASTOLIC BLOOD PRESSURE: 119 MMHG | HEART RATE: 77 BPM

## 2022-01-28 DIAGNOSIS — C77.0 SECONDARY MALIGNANCY OF LYMPH NODES OF HEAD, FACE AND NECK (H): ICD-10-CM

## 2022-01-28 DIAGNOSIS — C10.9 MALIGNANT NEOPLASM OF OROPHARYNX (H): ICD-10-CM

## 2022-01-28 PROCEDURE — 99204 OFFICE O/P NEW MOD 45 MIN: CPT | Mod: 25 | Performed by: RADIOLOGY

## 2022-01-28 PROCEDURE — 31231 NASAL ENDOSCOPY DX: CPT | Performed by: RADIOLOGY

## 2022-01-28 PROCEDURE — 31575 DIAGNOSTIC LARYNGOSCOPY: CPT | Performed by: RADIOLOGY

## 2022-01-28 PROCEDURE — G0463 HOSPITAL OUTPT CLINIC VISIT: HCPCS | Mod: 25 | Performed by: RADIOLOGY

## 2022-01-28 ASSESSMENT — ENCOUNTER SYMPTOMS
EYES NEGATIVE: 1
CONSTITUTIONAL NEGATIVE: 1
NERVOUS/ANXIOUS: 1
CARDIOVASCULAR NEGATIVE: 1
NEUROLOGICAL NEGATIVE: 1
DEPRESSION: 1
MUSCULOSKELETAL NEGATIVE: 1
GASTROINTESTINAL NEGATIVE: 1
RESPIRATORY NEGATIVE: 1

## 2022-01-28 NOTE — PROGRESS NOTES
Head & Neck Tumor Conference Note     Status: New  Staff: Dr. 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 He developed a left neck mass which was evaluated by his PCP and then referred to his local ENT. He had a CT neck performed on 2021 which showed a 2.5 x 2.0 cm neck mass. He had an U/S of the neck performed on 2021 which showed a 3.1 x 2.3 x 1.7 cm left neck mass.  He had an FNA of the neck mass on 2021 which showed p16+ SCC. He had a PET scan on 2022 which showed focal uptake in the left tonsil with no obvious CT correlate, 2 cm FDG avid left level IIA node, no distant disease. He saw medical oncology at [facility] and was recommended for radiation +/- chemotherapy, recommended against surgery due to the need for postoperative chemoradiation. He then transferred his care to the [facility]. He saw Dr Friedman on 2022 who discussed various treatment options with him. He was then seen by Dr. Valladares and discussed to be an appropriate surgical candidate.    Pertinent PMH:   Past Medical History:   Diagnosis Date     ADD (attention deficit disorder)      Anxiety      Crohn's disease (H)      Hyperlipidemia      Hypertension      Liposarcoma of lower extremity (H)         Social Hx:   Social History     Tobacco Use     Smoking status: Former Smoker     Types: Cigars     Start date: 1973     Quit date: 1990     Years since quittin.0     Smokeless tobacco: Never Used   Substance Use Topics     Alcohol use: Yes     Drug use: No     Imaging:     Outside CT 21 uploaded to PACS  Outside PET 22 uploaded to PACS    Pathology:   22 FNA Neck     Final Diagnosis   CASE FROM [place], [place] ([id number], OBTAINED 21):  LEFT NECK LYMPH NODE,  ULTRASOUND-GUIDED CORE BIOPSY:  -SCANT TISSUE SUGGESTIVE OF METASTATIC HPV-ASSOCIATED SQUAMOUS CELL CARCINOMA, SEE COMMENT.      Tumor Board Recommendation:   Discussion: This is a 66 year old male with newly diagnosed p-16+ squamous cell carcinoma of the likely left tonsil. His imaging is reviewed today and demonstrates a small mass in the left tonsil and two FDG avid lymph nodes in the left neck. The primary tumor is not well seen, but limited by lack of contrast. There is some uptake in the right maxilla, which correspond to prior dental extraction site. There is also uptake in the sigmoid colon consistent with possible diverticulitis.     Plan: Surgery or radiation therapy, former is recommended    Homa Mendenhall MD, PGY-3  Otolaryngology- Head and Neck Surgery    Documentation / Disclaimer Cancer Tumor Board Note  Cancer tumor board recommendations do not override what is determined to be reasonable care and treatment, which is dependent on the circumstances of a patient's case; the patient's medical, social, and personal concerns; and the clinical judgment of the oncologist [physician].

## 2022-01-28 NOTE — PROGRESS NOTES
Called and left message for patient to discuss tumor board recommendations and how patient wishes to proceed. Left direct line for return call to discuss.     Lesa Carrillo, RN, BSN

## 2022-01-28 NOTE — PROGRESS NOTES
HPI  INITIAL PATIENT ASSESSMENT    Diagnosis: Cancer    Prior radiation therapy: Radiation for liposarcoma    Prior chemotherapy: None    Prior hormonal therapy:No    Pain Eval:  Denies    Psychosocial  Living arrangements: Lives with wife  Fall Risk: independent   referral needs: Not needed    Advanced Directive: Yes - Location: at home  Implantable Cardiac Device? No      Nurse face-to-face time: Level 5:  over 15 min face to face time    Review of Systems   Constitutional: Negative.    HENT: Negative.    Eyes: Negative.    Respiratory: Negative.    Cardiovascular: Negative.    Gastrointestinal: Negative.    Genitourinary: Negative.    Musculoskeletal: Negative.    Skin: Negative.    Neurological: Negative.    Endo/Heme/Allergies: Negative.    Psychiatric/Behavioral: Positive for depression. The patient is nervous/anxious.      1/28/2022   9:45 AM  Scope used today  #1 Pentax Nasolaryngoscope [id number] Serial number [id number] (video)

## 2022-01-28 NOTE — LETTER
2022         RE: Flaco Willis III  [address] [place] [address]        HPI  INITIAL PATIENT ASSESSMENT    Diagnosis: Cancer    Prior radiation therapy: Radiation for liposarcoma    Prior chemotherapy: None    Prior hormonal therapy:No    Pain Eval:  Denies    Psychosocial  Living arrangements: Lives with wife  Fall Risk: independent   referral needs: Not needed    Advanced Directive: Yes - Location: at home  Implantable Cardiac Device? No      Nurse face-to-face time: Level 5:  over 15 min face to face time    Review of Systems   Constitutional: Negative.    HENT: Negative.    Eyes: Negative.    Respiratory: Negative.    Cardiovascular: Negative.    Gastrointestinal: Negative.    Genitourinary: Negative.    Musculoskeletal: Negative.    Skin: Negative.    Neurological: Negative.    Endo/Heme/Allergies: Negative.    Psychiatric/Behavioral: Positive for depression. The patient is nervous/anxious.      2022   9:45 AM  Scope used today  #1 Pentax Nasolaryngoscope [id number] Serial number [id number] (video)               Department of Radiation Oncology  [address] [address]  [phone]       Consultation Note    Name: Flaco Willis III MRN: [id number]   : 1955   Date of Service: 2022  Referring: Dr. 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 Imaging work-up ordered for further evaluation.    2021: CT of the neck reveals a 2.5 cm left level 2 node. No evidence of a mucosal primary tumor.    2021: Left neck FNA reveals a p16 positive squamous cell carcinoma.    2022: PET/CT demonstrates FDG uptake within the aforementioned 2 cm left level 2 node as well as focal uptake within the left tonsil without a  clear CT correlate. No imaging evidence of additional adenopathy or metastatic disease. He is seen by outside medical oncology and radiation oncology with recommendations to pursue definitive chemoradiotherapy.    1/19/2022: Presents to the [facility] for a second opinion. He is seen by Dr. Friedman in medical oncology who discusses treatment options places a referral to ENT for further evaluation.    1/26/2022: Evaluation by Dr. Valladares in head and neck surgery. Treatment options including surgical resection versus radiotherapy +/- chemotherapy are discussed. Referral placed to radiation oncology.    Past Medical History:    Myxoid liposarcoma    Irritable bowel    Hyperlipidemia    Anxiety    Past Surgical History:    Wide local excision excision of left proximal thigh tumor    Hernia repair    Chemotherapy History:  None    Radiation History:  1. Preoperative radiotherapy to the left medial thigh: 4500 cGy delivered in 25 daily fractions at [facility] in 2013    Pregnant: Not Applicable  Implanted Cardiac Devices: No    Medications:  Current Outpatient Medications   Medication Sig Dispense Refill     aspirin 81 MG tablet Take 81 mg by mouth       atorvastatin (LIPITOR) 10 MG tablet Take 10 mg by mouth At Bedtime  (Patient not taking: Reported on 1/19/2022)       atorvastatin (LIPITOR) 20 MG tablet Take 20 mg by mouth daily       busPIRone (BUSPAR) 10 MG tablet        Cholecalciferol (VITAMIN D3) 1000 UNITS CAPS Take 1,000 Units by mouth (Patient not taking: Reported on 1/19/2022)       dextroamphetamine (DEXEDRINE SPANSULE) 15 MG 24 hr capsule Take 15 mg by mouth (Patient not taking: Reported on 1/19/2022)       escitalopram (LEXAPRO) 10 MG tablet Take 10 mg by mouth daily       finasteride (PROPECIA) 1 MG tablet Take 1 mg by mouth (Patient not taking: Reported on 1/19/2022)       finasteride (PROSCAR) 5 MG tablet Take 1.25 mg by mouth       IBUPROFEN PO Take 400 mg by mouth as needed for  moderate pain        LORazepam (ATIVAN) 0.5 MG tablet Take 0.5-1 mg by mouth (Patient not taking: Reported on 1/26/2022)       Mesalamine (PENTASA PO)  (Patient not taking: Reported on 1/19/2022)       Omega-3 Fatty Acids (FISH OIL PO)        Omega-3 Fatty Acids (OMEGA-3 FISH OIL PO)  (Patient not taking: Reported on 1/19/2022)       tadalafil (CIALIS) 20 MG tablet Take 20 mg by mouth       TADALAFIL PO 1/2-1 tabs 1 hour before activity (Patient not taking: Reported on 1/19/2022)       VITAMIN E MTC PO  (Patient not taking: Reported on 1/19/2022)       zolpidem (AMBIEN) 10 MG tablet Take 10 mg by mouth (Patient not taking: Reported on 1/26/2022)        Allergies:    Augmentin    Social History:  Tobacco: Light smoking history in the past (social). Currently non-smoker.  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 Pertinent positives include:     Anxiety related to recent cancer diagnosis    Physical Exam   ECOG Status: 0    Vitals:  Weight: 75.3 kg  B/P: 168/119  P: 77  Pain: 0/10    General: 66-year-old gentleman seated comfortably in a chair no acute distress  HEENT: NC/AT. EOMI. No rhinorrhea or epistaxis. Moist mucous membranes. No oral cavity lesions. Palpation of the left tonsil reveals slight firmness/nodularity measuring approximately 1 cm in size. No palpable abnormalities throughout the remainder of the examination including oral tongue, bilateral tongue base and right tonsil.  Neck: There is a firm but mobile 2 cm enlarged lymph node within left level 2. No additional adenopathy palpated throughout the remainder of the bilateral neck.  Pulmonary: No wheezing, stridor or respiratory distress  Skin: Normal color and turgor    Flexible Fiberoptic Nasopharyngoscopy:  Consent for fiberoptic laryngoscopy was obtained and I confirmed  correctness of procedure and identity of patient. Fiberoptic laryngoscopy was indicated due to pre-treatment disease evaluation. The nose was topically decongested and anesthetized. The fiberoptic laryngoscope was passed through the left naris under endoscopic vision. The turbinates were normal. The inferior and middle meati were clear without purulence, masses, or polyps. The nasopharynx was clear. The Eustachian tubes were clear. The soft palate appeared normal with good mobility. Advancement of the scope into the oropharynx revealed no abnormalities within the posterior aspects of the bilateral tonsils, base of tongue or vallecula. The epiglottis was sharp and the vocal cords were mobile bilaterally. The piriform sinuses were clear bilaterally with no pooling of secretions. The scope was then removed and the procedure was terminated without incident.     Imaging/Path/Labs   Imaging: Reviewed    Path: Reviewed    Labs: Reviewed    Assessment    Mr. Willis is a 66 year old male with a newly diagnosed cT1 N1 M0 p16 positive squamous cell carcinoma.    Plan   I reviewed Mr. Willis's work-up to date with him at length. He has been previously seen on the outside with recommendations to undergo concurrent chemoradiotherapy for treatment of his newly diagnosed stage I disease.     On my clinical exam and review of his outside imaging, he has a small primary tumor within the left tonsil as well as a single FDG-avid ipsilateral node within left level 2 that I measure at approximately 2.2 cm on his axial scans and 2.8 cm in craniocaudal dimension. I have additionally reviewed his imaging with our neuroradiologists who see no convincing evidence concerning for radiographic extranodal extension on his prior CT neck.    I reviewed with Mr. Willis that given the lack of any significant concerning features on imaging or clinical examination, he could ideally be treated with single modality therapy alone and avoid toxicity  associated with multimodality treatment. He has previously met with Dr. Valladares who has recommended a radical tonsillectomy and ipsilateral lymph node dissection for treatment. I reviewed that, should he have only the known primary tumor and single ipsilateral lymph node on surgical pathology without any additional adverse features, he would not require any adjuvant treatment. To that end, I did discuss situations that I feel are less likely in his specific case in which additional therapy would be required including the presence of multiple lymph nodes (adjuvant radiation therapy alone) versus positive margins and/or extranodal extension (adjuvant chemoradiotherapy).     As an alternative to surgery, I discussed treatment with radiation therapy +/- chemotherapy. Since his PET performed earlier this month is lacking an accompanying contrast-enhanced study, I did recommend obtaining updated imaging with a neck CT  for improved delineation of his disease and evaluation of any evidence of progressive disease.     Unless there are substantial changes with updated imaging, I discussed that there is not a strong case for the inclusion of concurrent chemotherapy with radiation. While NCCN guidelines do recommend consideration of concurrent chemotherapy for multiple lymph nodes or individual lymph nodes >3 cm in particular, patients who are borderline or do not meet these criteria have no significant improvement in overall survival and minimal absolute benefit for locoregional disease control with the addition of systemic therapy. Likewise, concurrent cisplatin chemotherapy does add significant toxicity including risks of ototoxicity and peripheral neuropathy which would both be deleterious to Mr. Willis's livelihood.    Mr. Willis indicated that he understood the options presented to him and will think about these over the weekend. I will have my team reach out to him early next week to determine if he has finalized I plan  of care moving forward. If he elects to proceed with surgery, I will plan to see him back in our multidisciplinary head and neck cancer clinic in the postoperative setting to review his surgical pathology results and make a plan, if any, for adjuvant radiotherapy. If he instead elects for nonoperative therapy, I will make arrangements for him to undergo a repeat diagnostic CT neck along with a CT simulation performed in my department for radiotherapy planning purposes with treatment to start shortly thereafter.    Alcides Malone MD/PhD    Dept of Radiation Oncology  [facility]         Alcides Malone MD

## 2022-01-30 NOTE — PROGRESS NOTES
Department of Radiation Oncology  [facility]  [address] [place] [address]  [phone]       Consultation Note    Name: Flaco Willis III MRN: [id number]   : 1955   Date of Service: 2022  Referring: Dr. 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 Imaging work-up ordered for further evaluation.    2021: CT of the neck reveals a 2.5 cm left level 2 node. No evidence of a mucosal primary tumor.    2021: Left neck FNA reveals a p16 positive squamous cell carcinoma.    2022: PET/CT demonstrates FDG uptake within the aforementioned 2 cm left level 2 node as well as focal uptake within the left tonsil without a clear CT correlate. No imaging evidence of additional adenopathy or metastatic disease. He is seen by outside medical oncology and radiation oncology with recommendations to pursue definitive chemoradiotherapy.    2022: Presents to the [facility] for a second opinion. He is seen by Dr. Friedman in medical oncology who discusses treatment options places a referral to ENT for further evaluation.    2022: Evaluation by Dr. Valladares in head and neck surgery. Treatment options including surgical resection versus radiotherapy +/- chemotherapy are discussed. Referral placed to radiation oncology.    Past Medical History:    Myxoid liposarcoma    Irritable bowel    Hyperlipidemia    Anxiety    Past Surgical History:    Wide local excision excision of left proximal thigh tumor    Hernia repair    Chemotherapy History:  None    Radiation History:  1. Preoperative radiotherapy to the left medial thigh: 4500 cGy delivered in 25 daily fractions at [facility] in     Pregnant: Not Applicable  Implanted Cardiac Devices:  No    Medications:  Current Outpatient Medications   Medication Sig Dispense Refill     aspirin 81 MG tablet Take 81 mg by mouth       atorvastatin (LIPITOR) 10 MG tablet Take 10 mg by mouth At Bedtime  (Patient not taking: Reported on 1/19/2022)       atorvastatin (LIPITOR) 20 MG tablet Take 20 mg by mouth daily       busPIRone (BUSPAR) 10 MG tablet        Cholecalciferol (VITAMIN D3) 1000 UNITS CAPS Take 1,000 Units by mouth (Patient not taking: Reported on 1/19/2022)       dextroamphetamine (DEXEDRINE SPANSULE) 15 MG 24 hr capsule Take 15 mg by mouth (Patient not taking: Reported on 1/19/2022)       escitalopram (LEXAPRO) 10 MG tablet Take 10 mg by mouth daily       finasteride (PROPECIA) 1 MG tablet Take 1 mg by mouth (Patient not taking: Reported on 1/19/2022)       finasteride (PROSCAR) 5 MG tablet Take 1.25 mg by mouth       IBUPROFEN PO Take 400 mg by mouth as needed for moderate pain        LORazepam (ATIVAN) 0.5 MG tablet Take 0.5-1 mg by mouth (Patient not taking: Reported on 1/26/2022)       Mesalamine (PENTASA PO)  (Patient not taking: Reported on 1/19/2022)       Omega-3 Fatty Acids (FISH OIL PO)        Omega-3 Fatty Acids (OMEGA-3 FISH OIL PO)  (Patient not taking: Reported on 1/19/2022)       tadalafil (CIALIS) 20 MG tablet Take 20 mg by mouth       TADALAFIL PO 1/2-1 tabs 1 hour before activity (Patient not taking: Reported on 1/19/2022)       VITAMIN E MTC PO  (Patient not taking: Reported on 1/19/2022)       zolpidem (AMBIEN) 10 MG tablet Take 10 mg by mouth (Patient not taking: Reported on 1/26/2022)        Allergies:    Augmentin    Social History:  Tobacco: Light smoking history in the past (social). Currently non-smoker.  Alcohol: Social  Employment: Works as a musician in [employer]  Lives [place]    Family History:    No family history of head neck cancer    Review of Systems   A 10-point review of systems was performed. Pertinent positives include:     Anxiety related to recent  cancer diagnosis    Physical Exam   ECOG Status: 0    Vitals:  Weight: 75.3 kg  B/P: 168/119  P: 77  Pain: 0/10    General: 66-year-old gentleman seated comfortably in a chair no acute distress  HEENT: NC/AT. EOMI. No rhinorrhea or epistaxis. Moist mucous membranes. No oral cavity lesions. Palpation of the left tonsil reveals slight firmness/nodularity measuring approximately 1 cm in size. No palpable abnormalities throughout the remainder of the examination including oral tongue, bilateral tongue base and right tonsil.  Neck: There is a firm but mobile 2 cm enlarged lymph node within left level 2. No additional adenopathy palpated throughout the remainder of the bilateral neck.  Pulmonary: No wheezing, stridor or respiratory distress  Skin: Normal color and turgor    Flexible Fiberoptic Nasopharyngoscopy:  Consent for fiberoptic laryngoscopy was obtained and I confirmed correctness of procedure and identity of patient. Fiberoptic laryngoscopy was indicated due to pre-treatment disease evaluation. The nose was topically decongested and anesthetized. The fiberoptic laryngoscope was passed through the left naris under endoscopic vision. The turbinates were normal. The inferior and middle meati were clear without purulence, masses, or polyps. The nasopharynx was clear. The Eustachian tubes were clear. The soft palate appeared normal with good mobility. Advancement of the scope into the oropharynx revealed no abnormalities within the posterior aspects of the bilateral tonsils, base of tongue or vallecula. The epiglottis was sharp and the vocal cords were mobile bilaterally. The piriform sinuses were clear bilaterally with no pooling of secretions. The scope was then removed and the procedure was terminated without incident.     Imaging/Path/Labs   Imaging: Reviewed    Path: Reviewed    Labs: Reviewed    Assessment    Mr. Willis is a 66 year old male with a newly diagnosed cT1 N1 M0 p16 positive squamous cell  carcinoma.    Plan   I reviewed Mr. Willis's work-up to date with him at length. He has been previously seen on the outside with recommendations to undergo concurrent chemoradiotherapy for treatment of his newly diagnosed stage I disease.     On my clinical exam and review of his outside imaging, he has a small primary tumor within the left tonsil as well as a single FDG-avid ipsilateral node within left level 2 that I measure at approximately 2.2 cm on his axial scans and 2.8 cm in craniocaudal dimension. I have additionally reviewed his imaging with our neuroradiologists who see no convincing evidence concerning for radiographic extranodal extension on his prior CT neck.    I reviewed with Mr. Willis that given the lack of any significant concerning features on imaging or clinical examination, he could ideally be treated with single modality therapy alone and avoid toxicity associated with multimodality treatment. He has previously met with Dr. Valladares who has recommended a radical tonsillectomy and ipsilateral lymph node dissection for treatment. I reviewed that, should he have only the known primary tumor and single ipsilateral lymph node on surgical pathology without any additional adverse features, he would not require any adjuvant treatment. To that end, I did discuss situations that I feel are less likely in his specific case in which additional therapy would be required including the presence of multiple lymph nodes (adjuvant radiation therapy alone) versus positive margins and/or extranodal extension (adjuvant chemoradiotherapy).     As an alternative to surgery, I discussed treatment with radiation therapy +/- chemotherapy. Since his PET performed earlier this month is lacking an accompanying contrast-enhanced study, I did recommend obtaining updated imaging with a neck CT  for improved delineation of his disease and evaluation of any evidence of progressive disease.     Unless there are substantial changes  with updated imaging, I discussed that there is not a strong case for the inclusion of concurrent chemotherapy with radiation. While NCCN guidelines do recommend consideration of concurrent chemotherapy for multiple lymph nodes or individual lymph nodes >3 cm in particular, patients who are borderline or do not meet these criteria have no significant improvement in overall survival and minimal absolute benefit for locoregional disease control with the addition of systemic therapy. Likewise, concurrent cisplatin chemotherapy does add significant toxicity including risks of ototoxicity and peripheral neuropathy which would both be deleterious to Mr. Willis's livelihood.    Mr. Willis indicated that he understood the options presented to him and will think about these over the weekend. I will have my team reach out to him early next week to determine if he has finalized I plan of care moving forward. If he elects to proceed with surgery, I will plan to see him back in our multidisciplinary head and neck cancer clinic in the postoperative setting to review his surgical pathology results and make a plan, if any, for adjuvant radiotherapy. If he instead elects for nonoperative therapy, I will make arrangements for him to undergo a repeat diagnostic CT neck along with a CT simulation performed in my department for radiotherapy planning purposes with treatment to start shortly thereafter.    Alcides Malone MD/PhD    Dept of Radiation Oncology  [facility]

## 2022-02-01 ENCOUNTER — PATIENT OUTREACH (OUTPATIENT)
Dept: OTOLARYNGOLOGY | Facility: CLINIC | Age: 67
End: 2022-02-01
Payer: COMMERCIAL

## 2022-02-01 ENCOUNTER — PREP FOR PROCEDURE (OUTPATIENT)
Dept: OTOLARYNGOLOGY | Facility: CLINIC | Age: 67
End: 2022-02-01
Payer: COMMERCIAL

## 2022-02-01 DIAGNOSIS — C10.9 MALIGNANT NEOPLASM OF OROPHARYNX (H): Primary | ICD-10-CM

## 2022-02-01 NOTE — PROGRESS NOTES
Patient returned call to discuss treatment options. Patient feels that after talking to Dr. Malone and Dr Valladares, he is leaning towards proceeding with surgery. He would like to proceed with the process of scheduling at this time. Discussed with patient that he will need a pre-op with his PCP as well as covid testing within 4 days of procedure. He will arrange his pre-op physical    Discussed the need to proceed with updated CT neck with contrast for further surgical planning. Patient agreeable to proceed with this. Schedulers will call him to schedule.     Reviewed pre-op teaching and and patient verbalized understanding. He will call with any further questions or concerns. We will work on date for surgery and contact patient to confirm.     Lesa Carrillo, RN, BSN

## 2022-02-01 NOTE — PROGRESS NOTES
Called and left message for patient to check in to see if he has made a decision on surgery versus radiation or if he has any additional questions prior to deciding. Left direct line for return call to discuss.     Lesa Carrillo RN, BSN

## 2022-02-02 DIAGNOSIS — Z11.59 ENCOUNTER FOR SCREENING FOR OTHER VIRAL DISEASES: Primary | ICD-10-CM

## 2022-02-03 ENCOUNTER — PATIENT OUTREACH (OUTPATIENT)
Dept: OTOLARYNGOLOGY | Facility: CLINIC | Age: 67
End: 2022-02-03
Payer: COMMERCIAL

## 2022-02-03 NOTE — TELEPHONE ENCOUNTER
Called patient and left message for patient indicating that we have him scheduled for CT neck tomorrow 2/4 at 8:40am and surgery planned for 2/15 with Dr. Valladares. Advised patient return call to discuss and confirm. Left direct line for return call.     Lesa Carrillo, RN, BSN

## 2022-02-04 ENCOUNTER — HOSPITAL ENCOUNTER (OUTPATIENT)
Dept: CT IMAGING | Facility: CLINIC | Age: 67
Discharge: HOME | End: 2022-02-04
Attending: OTOLARYNGOLOGY | Admitting: OTOLARYNGOLOGY
Payer: COMMERCIAL

## 2022-02-04 DIAGNOSIS — C10.9 MALIGNANT NEOPLASM OF OROPHARYNX (H): ICD-10-CM

## 2022-02-04 PROCEDURE — 250N000009 HC RX 250: Performed by: OTOLARYNGOLOGY

## 2022-02-04 PROCEDURE — 70491 CT SOFT TISSUE NECK W/DYE: CPT

## 2022-02-04 PROCEDURE — 250N000011 HC RX IP 250 OP 636: Performed by: OTOLARYNGOLOGY

## 2022-02-04 RX ORDER — IOPAMIDOL 755 MG/ML
80 INJECTION, SOLUTION INTRAVASCULAR ONCE
Status: COMPLETED | OUTPATIENT
Start: 2022-02-04 | End: 2022-02-04

## 2022-02-04 RX ADMIN — IOPAMIDOL 80 ML: 755 INJECTION, SOLUTION INTRAVENOUS at 08:49

## 2022-02-04 RX ADMIN — SODIUM CHLORIDE 60 ML: 900 INJECTION INTRAVENOUS at 08:49

## 2022-02-08 ENCOUNTER — PATIENT OUTREACH (OUTPATIENT)
Dept: OTOLARYNGOLOGY | Facility: CLINIC | Age: 67
End: 2022-02-08
Payer: COMMERCIAL

## 2022-02-08 NOTE — PROGRESS NOTES
Multiple attempts to reach patient to review CT scan results and plan for surgery. Left direct line for return call to discuss.     Lesa Carrillo, RN, BSN

## 2022-02-10 ENCOUNTER — PATIENT OUTREACH (OUTPATIENT)
Dept: OTOLARYNGOLOGY | Facility: CLINIC | Age: 67
End: 2022-02-10
Payer: COMMERCIAL

## 2022-02-10 NOTE — PROGRESS NOTES
Patient returned call to review the following CT scan results:    IMPRESSION:    1. Left level 2A debbi mass measuring up to 2.2 cm on axial imaging is  not significantly changed in size since most recent neck CT  11/16/2021. No new suspicious cervical chain lymph node.  2. No definite suspicious primary mass identified within the neck.  Slight soft tissue fullness along the left lateral oropharynx which  statistically would be a suspicious site of primary tumor given the  level 2A node. Clinical correlation is recommended.      Reviewed with patient that the recommendation is to proceed as planned for surgery. Patient in agreement and again asked multiple questions regarding the incision, scar and healing. Discussed with patient to the best of my ability regarding typical post op information and healing. Advised that if he has additional questions or would like to further discuss we should arrange follow-up with Dr. Valladares prior to surgery. Patient indicates that he is ready to proceed at this time and does not need additional follow-up but will return call to writer if he has additional questions or concerns.     Patient has scheduled pre-op physical with PCP tomorrow 2/11 and has covid testing arranged for 2/12.     Lesa Carrillo, RN, BSN

## 2022-02-12 ENCOUNTER — LAB (OUTPATIENT)
Dept: URGENT CARE | Facility: URGENT CARE | Age: 67
End: 2022-02-12
Attending: OTOLARYNGOLOGY
Payer: COMMERCIAL

## 2022-02-12 DIAGNOSIS — Z11.59 ENCOUNTER FOR SCREENING FOR OTHER VIRAL DISEASES: ICD-10-CM

## 2022-02-12 PROCEDURE — U0005 INFEC AGEN DETEC AMPLI PROBE: HCPCS

## 2022-02-12 PROCEDURE — U0003 INFECTIOUS AGENT DETECTION BY NUCLEIC ACID (DNA OR RNA); SEVERE ACUTE RESPIRATORY SYNDROME CORONAVIRUS 2 (SARS-COV-2) (CORONAVIRUS DISEASE [COVID-19]), AMPLIFIED PROBE TECHNIQUE, MAKING USE OF HIGH THROUGHPUT TECHNOLOGIES AS DESCRIBED BY CMS-2020-01-R: HCPCS

## 2022-02-13 ENCOUNTER — ANESTHESIA EVENT (OUTPATIENT)
Dept: SURGERY | Facility: CLINIC | Age: 67
DRG: 141 | End: 2022-02-13
Payer: COMMERCIAL

## 2022-02-13 LAB — SARS-COV-2 RNA RESP QL NAA+PROBE: NEGATIVE

## 2022-02-15 ENCOUNTER — APPOINTMENT (OUTPATIENT)
Dept: GENERAL RADIOLOGY | Facility: CLINIC | Age: 67
DRG: 141 | End: 2022-02-15
Attending: OTOLARYNGOLOGY
Payer: COMMERCIAL

## 2022-02-15 ENCOUNTER — ANESTHESIA (OUTPATIENT)
Dept: SURGERY | Facility: CLINIC | Age: 67
DRG: 141 | End: 2022-02-15
Payer: COMMERCIAL

## 2022-02-15 ENCOUNTER — HOSPITAL ENCOUNTER (INPATIENT)
Facility: CLINIC | Age: 67
LOS: 4 days | Discharge: HOME | DRG: 141 | End: 2022-02-19
Attending: OTOLARYNGOLOGY | Admitting: OTOLARYNGOLOGY
Payer: COMMERCIAL

## 2022-02-15 DIAGNOSIS — C09.9 TONSILLAR CANCER (H): Primary | ICD-10-CM

## 2022-02-15 LAB
ABO/RH(D): NORMAL
ANTIBODY SCREEN: NEGATIVE
CREAT SERPL-MCNC: 0.84 MG/DL (ref 0.66–1.25)
GFR SERPL CREATININE-BSD FRML MDRD: >90 ML/MIN/1.73M2
GLUCOSE BLDC GLUCOMTR-MCNC: 113 MG/DL (ref 70–99)
SPECIMEN EXPIRATION DATE: NORMAL

## 2022-02-15 PROCEDURE — 258N000003 HC RX IP 258 OP 636

## 2022-02-15 PROCEDURE — 250N000011 HC RX IP 250 OP 636: Performed by: STUDENT IN AN ORGANIZED HEALTH CARE EDUCATION/TRAINING PROGRAM

## 2022-02-15 PROCEDURE — 250N000011 HC RX IP 250 OP 636

## 2022-02-15 PROCEDURE — 82565 ASSAY OF CREATININE: CPT | Performed by: STUDENT IN AN ORGANIZED HEALTH CARE EDUCATION/TRAINING PROGRAM

## 2022-02-15 PROCEDURE — 88331 PATH CONSLTJ SURG 1 BLK 1SPC: CPT | Mod: 26 | Performed by: PATHOLOGY

## 2022-02-15 PROCEDURE — 250N000009 HC RX 250: Performed by: STUDENT IN AN ORGANIZED HEALTH CARE EDUCATION/TRAINING PROGRAM

## 2022-02-15 PROCEDURE — 250N000009 HC RX 250: Performed by: OTOLARYNGOLOGY

## 2022-02-15 PROCEDURE — 0CJS7ZZ INSPECTION OF LARYNX, VIA NATURAL OR ARTIFICIAL OPENING: ICD-10-PCS | Performed by: OTOLARYNGOLOGY

## 2022-02-15 PROCEDURE — 250N000025 HC SEVOFLURANE, PER MIN: Performed by: OTOLARYNGOLOGY

## 2022-02-15 PROCEDURE — 0CTPXZZ RESECTION OF TONSILS, EXTERNAL APPROACH: ICD-10-PCS | Performed by: OTOLARYNGOLOGY

## 2022-02-15 PROCEDURE — 999N000141 HC STATISTIC PRE-PROCEDURE NURSING ASSESSMENT: Performed by: OTOLARYNGOLOGY

## 2022-02-15 PROCEDURE — 250N000011 HC RX IP 250 OP 636: Performed by: OTOLARYNGOLOGY

## 2022-02-15 PROCEDURE — 258N000003 HC RX IP 258 OP 636: Performed by: OTOLARYNGOLOGY

## 2022-02-15 PROCEDURE — 38724 REMOVAL OF LYMPH NODES NECK: CPT | Mod: LT | Performed by: OTOLARYNGOLOGY

## 2022-02-15 PROCEDURE — 370N000017 HC ANESTHESIA TECHNICAL FEE, PER MIN: Performed by: OTOLARYNGOLOGY

## 2022-02-15 PROCEDURE — 250N000009 HC RX 250

## 2022-02-15 PROCEDURE — 88305 TISSUE EXAM BY PATHOLOGIST: CPT | Mod: TC | Performed by: OTOLARYNGOLOGY

## 2022-02-15 PROCEDURE — 42842 EXTENSIVE SURGERY OF THROAT: CPT | Mod: 51 | Performed by: OTOLARYNGOLOGY

## 2022-02-15 PROCEDURE — 258N000003 HC RX IP 258 OP 636: Performed by: STUDENT IN AN ORGANIZED HEALTH CARE EDUCATION/TRAINING PROGRAM

## 2022-02-15 PROCEDURE — 86901 BLOOD TYPING SEROLOGIC RH(D): CPT | Performed by: STUDENT IN AN ORGANIZED HEALTH CARE EDUCATION/TRAINING PROGRAM

## 2022-02-15 PROCEDURE — 999N000065 XR ABDOMEN PORT 1 VIEWS

## 2022-02-15 PROCEDURE — 88305 TISSUE EXAM BY PATHOLOGIST: CPT | Mod: 26 | Performed by: PATHOLOGY

## 2022-02-15 PROCEDURE — 07T20ZZ RESECTION OF LEFT NECK LYMPHATIC, OPEN APPROACH: ICD-10-PCS | Performed by: OTOLARYNGOLOGY

## 2022-02-15 PROCEDURE — 86850 RBC ANTIBODY SCREEN: CPT | Performed by: STUDENT IN AN ORGANIZED HEALTH CARE EDUCATION/TRAINING PROGRAM

## 2022-02-15 PROCEDURE — 120N000002 HC R&B MED SURG/OB UMMC

## 2022-02-15 PROCEDURE — 36415 COLL VENOUS BLD VENIPUNCTURE: CPT | Performed by: STUDENT IN AN ORGANIZED HEALTH CARE EDUCATION/TRAINING PROGRAM

## 2022-02-15 PROCEDURE — 88307 TISSUE EXAM BY PATHOLOGIST: CPT | Mod: 26 | Performed by: PATHOLOGY

## 2022-02-15 PROCEDURE — 88309 TISSUE EXAM BY PATHOLOGIST: CPT | Mod: 26 | Performed by: PATHOLOGY

## 2022-02-15 PROCEDURE — 250N000013 HC RX MED GY IP 250 OP 250 PS 637: Performed by: STUDENT IN AN ORGANIZED HEALTH CARE EDUCATION/TRAINING PROGRAM

## 2022-02-15 PROCEDURE — 360N000077 HC SURGERY LEVEL 4, PER MIN: Performed by: OTOLARYNGOLOGY

## 2022-02-15 PROCEDURE — 710N000010 HC RECOVERY PHASE 1, LEVEL 2, PER MIN: Performed by: OTOLARYNGOLOGY

## 2022-02-15 PROCEDURE — 272N000001 HC OR GENERAL SUPPLY STERILE: Performed by: OTOLARYNGOLOGY

## 2022-02-15 PROCEDURE — 88331 PATH CONSLTJ SURG 1 BLK 1SPC: CPT | Mod: TC | Performed by: OTOLARYNGOLOGY

## 2022-02-15 PROCEDURE — 0CBPXZX EXCISION OF TONSILS, EXTERNAL APPROACH, DIAGNOSTIC: ICD-10-PCS | Performed by: OTOLARYNGOLOGY

## 2022-02-15 PROCEDURE — 74018 RADEX ABDOMEN 1 VIEW: CPT | Mod: 26 | Performed by: RADIOLOGY

## 2022-02-15 RX ORDER — NALOXONE HYDROCHLORIDE 0.4 MG/ML
0.4 INJECTION, SOLUTION INTRAMUSCULAR; INTRAVENOUS; SUBCUTANEOUS
Status: DISCONTINUED | OUTPATIENT
Start: 2022-02-15 | End: 2022-02-19 | Stop reason: HOSPADM

## 2022-02-15 RX ORDER — NALOXONE HYDROCHLORIDE 0.4 MG/ML
0.2 INJECTION, SOLUTION INTRAMUSCULAR; INTRAVENOUS; SUBCUTANEOUS
Status: DISCONTINUED | OUTPATIENT
Start: 2022-02-15 | End: 2022-02-19 | Stop reason: HOSPADM

## 2022-02-15 RX ORDER — FENTANYL CITRATE 50 UG/ML
25 INJECTION, SOLUTION INTRAMUSCULAR; INTRAVENOUS EVERY 5 MIN PRN
Status: DISCONTINUED | OUTPATIENT
Start: 2022-02-15 | End: 2022-02-15 | Stop reason: HOSPADM

## 2022-02-15 RX ORDER — ONDANSETRON 2 MG/ML
4 INJECTION INTRAMUSCULAR; INTRAVENOUS EVERY 30 MIN PRN
Status: DISCONTINUED | OUTPATIENT
Start: 2022-02-15 | End: 2022-02-15 | Stop reason: HOSPADM

## 2022-02-15 RX ORDER — BUSPIRONE HYDROCHLORIDE 5 MG/1
10 TABLET ORAL 2 TIMES DAILY
Status: DISCONTINUED | OUTPATIENT
Start: 2022-02-15 | End: 2022-02-19 | Stop reason: HOSPADM

## 2022-02-15 RX ORDER — CHLORHEXIDINE GLUCONATE ORAL RINSE 1.2 MG/ML
10 SOLUTION DENTAL 4 TIMES DAILY
Status: DISCONTINUED | OUTPATIENT
Start: 2022-02-15 | End: 2022-02-19 | Stop reason: HOSPADM

## 2022-02-15 RX ORDER — DEXAMETHASONE SODIUM PHOSPHATE 4 MG/ML
INJECTION, SOLUTION INTRA-ARTICULAR; INTRALESIONAL; INTRAMUSCULAR; INTRAVENOUS; SOFT TISSUE PRN
Status: DISCONTINUED | OUTPATIENT
Start: 2022-02-15 | End: 2022-02-15

## 2022-02-15 RX ORDER — DIPHENHYDRAMINE HYDROCHLORIDE 50 MG/ML
12.5 INJECTION INTRAMUSCULAR; INTRAVENOUS EVERY 6 HOURS PRN
Status: DISCONTINUED | OUTPATIENT
Start: 2022-02-15 | End: 2022-02-19 | Stop reason: HOSPADM

## 2022-02-15 RX ORDER — GINSENG 100 MG
CAPSULE ORAL EVERY 8 HOURS
Status: COMPLETED | OUTPATIENT
Start: 2022-02-15 | End: 2022-02-16

## 2022-02-15 RX ORDER — DEXAMETHASONE SODIUM PHOSPHATE 10 MG/ML
10 INJECTION, SOLUTION INTRAMUSCULAR; INTRAVENOUS ONCE
Status: DISCONTINUED | OUTPATIENT
Start: 2022-02-15 | End: 2022-02-15 | Stop reason: HOSPADM

## 2022-02-15 RX ORDER — PROPOFOL 10 MG/ML
INJECTION, EMULSION INTRAVENOUS PRN
Status: DISCONTINUED | OUTPATIENT
Start: 2022-02-15 | End: 2022-02-15

## 2022-02-15 RX ORDER — FENTANYL CITRATE 50 UG/ML
INJECTION, SOLUTION INTRAMUSCULAR; INTRAVENOUS PRN
Status: DISCONTINUED | OUTPATIENT
Start: 2022-02-15 | End: 2022-02-15

## 2022-02-15 RX ORDER — FINASTERIDE 5 MG/1
1.25 TABLET, FILM COATED ORAL DAILY
Status: DISCONTINUED | OUTPATIENT
Start: 2022-02-15 | End: 2022-02-15

## 2022-02-15 RX ORDER — OXYCODONE HYDROCHLORIDE 5 MG/1
5 TABLET ORAL EVERY 4 HOURS PRN
Status: DISCONTINUED | OUTPATIENT
Start: 2022-02-15 | End: 2022-02-17

## 2022-02-15 RX ORDER — ONDANSETRON 2 MG/ML
INJECTION INTRAMUSCULAR; INTRAVENOUS PRN
Status: DISCONTINUED | OUTPATIENT
Start: 2022-02-15 | End: 2022-02-15

## 2022-02-15 RX ORDER — LABETALOL HYDROCHLORIDE 5 MG/ML
10 INJECTION, SOLUTION INTRAVENOUS
Status: COMPLETED | OUTPATIENT
Start: 2022-02-15 | End: 2022-02-15

## 2022-02-15 RX ORDER — CLINDAMYCIN PHOSPHATE 900 MG/50ML
900 INJECTION, SOLUTION INTRAVENOUS SEE ADMIN INSTRUCTIONS
Status: DISCONTINUED | OUTPATIENT
Start: 2022-02-15 | End: 2022-02-15 | Stop reason: HOSPADM

## 2022-02-15 RX ORDER — SODIUM CHLORIDE, SODIUM LACTATE, POTASSIUM CHLORIDE, CALCIUM CHLORIDE 600; 310; 30; 20 MG/100ML; MG/100ML; MG/100ML; MG/100ML
INJECTION, SOLUTION INTRAVENOUS CONTINUOUS
Status: DISCONTINUED | OUTPATIENT
Start: 2022-02-15 | End: 2022-02-17

## 2022-02-15 RX ORDER — ATORVASTATIN CALCIUM 20 MG/1
20 TABLET, FILM COATED ORAL EVERY EVENING
Status: DISCONTINUED | OUTPATIENT
Start: 2022-02-15 | End: 2022-02-19 | Stop reason: HOSPADM

## 2022-02-15 RX ORDER — OXYCODONE HYDROCHLORIDE 5 MG/1
5 TABLET ORAL EVERY 4 HOURS PRN
Status: DISCONTINUED | OUTPATIENT
Start: 2022-02-15 | End: 2022-02-15 | Stop reason: HOSPADM

## 2022-02-15 RX ORDER — DIPHENHYDRAMINE HCL 12.5MG/5ML
12.5 LIQUID (ML) ORAL EVERY 6 HOURS PRN
Status: DISCONTINUED | OUTPATIENT
Start: 2022-02-15 | End: 2022-02-19 | Stop reason: HOSPADM

## 2022-02-15 RX ORDER — LIDOCAINE HYDROCHLORIDE AND EPINEPHRINE 10; 10 MG/ML; UG/ML
INJECTION, SOLUTION INFILTRATION; PERINEURAL PRN
Status: DISCONTINUED | OUTPATIENT
Start: 2022-02-15 | End: 2022-02-15 | Stop reason: HOSPADM

## 2022-02-15 RX ORDER — ONDANSETRON 2 MG/ML
4 INJECTION INTRAMUSCULAR; INTRAVENOUS EVERY 6 HOURS PRN
Status: DISCONTINUED | OUTPATIENT
Start: 2022-02-15 | End: 2022-02-19 | Stop reason: HOSPADM

## 2022-02-15 RX ORDER — EPHEDRINE SULFATE 50 MG/ML
INJECTION, SOLUTION INTRAMUSCULAR; INTRAVENOUS; SUBCUTANEOUS PRN
Status: DISCONTINUED | OUTPATIENT
Start: 2022-02-15 | End: 2022-02-15

## 2022-02-15 RX ORDER — SODIUM CHLORIDE 9 MG/ML
INJECTION, SOLUTION INTRAVENOUS CONTINUOUS PRN
Status: DISCONTINUED | OUTPATIENT
Start: 2022-02-15 | End: 2022-02-15

## 2022-02-15 RX ORDER — LIDOCAINE HYDROCHLORIDE 20 MG/ML
INJECTION, SOLUTION INFILTRATION; PERINEURAL PRN
Status: DISCONTINUED | OUTPATIENT
Start: 2022-02-15 | End: 2022-02-15

## 2022-02-15 RX ORDER — LIDOCAINE 40 MG/G
CREAM TOPICAL
Status: DISCONTINUED | OUTPATIENT
Start: 2022-02-15 | End: 2022-02-15 | Stop reason: HOSPADM

## 2022-02-15 RX ORDER — POLYETHYLENE GLYCOL 3350 17 G/17G
17 POWDER, FOR SOLUTION ORAL DAILY
Status: DISCONTINUED | OUTPATIENT
Start: 2022-02-16 | End: 2022-02-19 | Stop reason: HOSPADM

## 2022-02-15 RX ORDER — ACETAMINOPHEN 325 MG/1
650 TABLET ORAL EVERY 4 HOURS PRN
Status: DISCONTINUED | OUTPATIENT
Start: 2022-02-18 | End: 2022-02-19 | Stop reason: HOSPADM

## 2022-02-15 RX ORDER — HYDRALAZINE HYDROCHLORIDE 20 MG/ML
2.5-5 INJECTION INTRAMUSCULAR; INTRAVENOUS EVERY 10 MIN PRN
Status: DISCONTINUED | OUTPATIENT
Start: 2022-02-15 | End: 2022-02-15 | Stop reason: HOSPADM

## 2022-02-15 RX ORDER — FINASTERIDE 1 MG/1
1 TABLET, FILM COATED ORAL EVERY EVENING
Status: DISCONTINUED | OUTPATIENT
Start: 2022-02-15 | End: 2022-02-19 | Stop reason: HOSPADM

## 2022-02-15 RX ORDER — ACETAMINOPHEN 325 MG/1
975 TABLET ORAL EVERY 8 HOURS
Status: DISPENSED | OUTPATIENT
Start: 2022-02-15 | End: 2022-02-18

## 2022-02-15 RX ORDER — PROCHLORPERAZINE MALEATE 5 MG
5 TABLET ORAL EVERY 6 HOURS PRN
Status: DISCONTINUED | OUTPATIENT
Start: 2022-02-15 | End: 2022-02-19 | Stop reason: HOSPADM

## 2022-02-15 RX ORDER — ESCITALOPRAM OXALATE 10 MG/1
10 TABLET ORAL DAILY
Status: DISCONTINUED | OUTPATIENT
Start: 2022-02-16 | End: 2022-02-19 | Stop reason: HOSPADM

## 2022-02-15 RX ORDER — OXYCODONE HYDROCHLORIDE 10 MG/1
10 TABLET ORAL EVERY 4 HOURS PRN
Status: DISCONTINUED | OUTPATIENT
Start: 2022-02-15 | End: 2022-02-17

## 2022-02-15 RX ORDER — HALOPERIDOL 5 MG/ML
1 INJECTION INTRAMUSCULAR
Status: DISCONTINUED | OUTPATIENT
Start: 2022-02-15 | End: 2022-02-15 | Stop reason: HOSPADM

## 2022-02-15 RX ORDER — SODIUM CHLORIDE, SODIUM LACTATE, POTASSIUM CHLORIDE, CALCIUM CHLORIDE 600; 310; 30; 20 MG/100ML; MG/100ML; MG/100ML; MG/100ML
INJECTION, SOLUTION INTRAVENOUS CONTINUOUS PRN
Status: DISCONTINUED | OUTPATIENT
Start: 2022-02-15 | End: 2022-02-15

## 2022-02-15 RX ORDER — CLINDAMYCIN PHOSPHATE 900 MG/50ML
900 INJECTION, SOLUTION INTRAVENOUS
Status: COMPLETED | OUTPATIENT
Start: 2022-02-15 | End: 2022-02-15

## 2022-02-15 RX ORDER — SODIUM CHLORIDE, SODIUM LACTATE, POTASSIUM CHLORIDE, CALCIUM CHLORIDE 600; 310; 30; 20 MG/100ML; MG/100ML; MG/100ML; MG/100ML
INJECTION, SOLUTION INTRAVENOUS CONTINUOUS
Status: DISCONTINUED | OUTPATIENT
Start: 2022-02-15 | End: 2022-02-15 | Stop reason: HOSPADM

## 2022-02-15 RX ORDER — ONDANSETRON 4 MG/1
4 TABLET, ORALLY DISINTEGRATING ORAL EVERY 30 MIN PRN
Status: DISCONTINUED | OUTPATIENT
Start: 2022-02-15 | End: 2022-02-15 | Stop reason: HOSPADM

## 2022-02-15 RX ORDER — LORAZEPAM 2 MG/ML
.5-1 CONCENTRATE ORAL EVERY 8 HOURS PRN
Status: DISCONTINUED | OUTPATIENT
Start: 2022-02-15 | End: 2022-02-19 | Stop reason: HOSPADM

## 2022-02-15 RX ORDER — HYDROMORPHONE HCL IN WATER/PF 6 MG/30 ML
0.4 PATIENT CONTROLLED ANALGESIA SYRINGE INTRAVENOUS
Status: DISCONTINUED | OUTPATIENT
Start: 2022-02-15 | End: 2022-02-17

## 2022-02-15 RX ORDER — AMOXICILLIN 250 MG
1 CAPSULE ORAL 2 TIMES DAILY
Status: DISCONTINUED | OUTPATIENT
Start: 2022-02-15 | End: 2022-02-19 | Stop reason: HOSPADM

## 2022-02-15 RX ORDER — ALBUTEROL SULFATE 0.83 MG/ML
2.5 SOLUTION RESPIRATORY (INHALATION) EVERY 4 HOURS PRN
Status: DISCONTINUED | OUTPATIENT
Start: 2022-02-15 | End: 2022-02-15 | Stop reason: HOSPADM

## 2022-02-15 RX ORDER — ACETAMINOPHEN 325 MG/1
975 TABLET ORAL ONCE
Status: COMPLETED | OUTPATIENT
Start: 2022-02-15 | End: 2022-02-15

## 2022-02-15 RX ORDER — MINERAL OIL/HYDROPHIL PETROLAT
OINTMENT (GRAM) TOPICAL EVERY 8 HOURS
Status: DISCONTINUED | OUTPATIENT
Start: 2022-02-16 | End: 2022-02-19 | Stop reason: HOSPADM

## 2022-02-15 RX ORDER — ONDANSETRON 4 MG/1
4 TABLET, ORALLY DISINTEGRATING ORAL EVERY 6 HOURS PRN
Status: DISCONTINUED | OUTPATIENT
Start: 2022-02-15 | End: 2022-02-19 | Stop reason: HOSPADM

## 2022-02-15 RX ORDER — LIDOCAINE 40 MG/G
CREAM TOPICAL
Status: DISCONTINUED | OUTPATIENT
Start: 2022-02-15 | End: 2022-02-19 | Stop reason: HOSPADM

## 2022-02-15 RX ORDER — HYDROMORPHONE HCL IN WATER/PF 6 MG/30 ML
0.2 PATIENT CONTROLLED ANALGESIA SYRINGE INTRAVENOUS
Status: DISCONTINUED | OUTPATIENT
Start: 2022-02-15 | End: 2022-02-17

## 2022-02-15 RX ORDER — HYDROMORPHONE HYDROCHLORIDE 1 MG/ML
0.2 INJECTION, SOLUTION INTRAMUSCULAR; INTRAVENOUS; SUBCUTANEOUS EVERY 5 MIN PRN
Status: DISCONTINUED | OUTPATIENT
Start: 2022-02-15 | End: 2022-02-15 | Stop reason: HOSPADM

## 2022-02-15 RX ORDER — BISACODYL 10 MG
10 SUPPOSITORY, RECTAL RECTAL DAILY PRN
Status: DISCONTINUED | OUTPATIENT
Start: 2022-02-15 | End: 2022-02-19 | Stop reason: HOSPADM

## 2022-02-15 RX ADMIN — Medication 5 MG: at 16:18

## 2022-02-15 RX ADMIN — SODIUM CHLORIDE: 9 INJECTION, SOLUTION INTRAVENOUS at 13:25

## 2022-02-15 RX ADMIN — CHLORHEXIDINE GLUCONATE 0.12% ORAL RINSE 10 ML: 1.2 LIQUID ORAL at 20:10

## 2022-02-15 RX ADMIN — ROCURONIUM BROMIDE 50 MG: 50 INJECTION, SOLUTION INTRAVENOUS at 13:15

## 2022-02-15 RX ADMIN — PROPOFOL 150 MG: 10 INJECTION, EMULSION INTRAVENOUS at 13:15

## 2022-02-15 RX ADMIN — MIDAZOLAM 2 MG: 1 INJECTION INTRAMUSCULAR; INTRAVENOUS at 13:11

## 2022-02-15 RX ADMIN — SODIUM CHLORIDE, POTASSIUM CHLORIDE, SODIUM LACTATE AND CALCIUM CHLORIDE: 600; 310; 30; 20 INJECTION, SOLUTION INTRAVENOUS at 13:09

## 2022-02-15 RX ADMIN — Medication 5 MG: at 14:05

## 2022-02-15 RX ADMIN — Medication 5 MG: at 14:15

## 2022-02-15 RX ADMIN — FENTANYL CITRATE 50 MCG: 50 INJECTION, SOLUTION INTRAMUSCULAR; INTRAVENOUS at 13:47

## 2022-02-15 RX ADMIN — SUGAMMADEX 200 MG: 100 INJECTION, SOLUTION INTRAVENOUS at 17:07

## 2022-02-15 RX ADMIN — ACETAMINOPHEN 975 MG: 325 TABLET, FILM COATED ORAL at 20:10

## 2022-02-15 RX ADMIN — FENTANYL CITRATE 200 MCG: 50 INJECTION, SOLUTION INTRAMUSCULAR; INTRAVENOUS at 13:15

## 2022-02-15 RX ADMIN — LIDOCAINE HYDROCHLORIDE 100 MG: 20 INJECTION, SOLUTION INFILTRATION; PERINEURAL at 13:15

## 2022-02-15 RX ADMIN — Medication 5 MG: at 15:49

## 2022-02-15 RX ADMIN — OXYCODONE HYDROCHLORIDE 10 MG: 10 TABLET ORAL at 22:17

## 2022-02-15 RX ADMIN — ATORVASTATIN CALCIUM 20 MG: 20 TABLET, FILM COATED ORAL at 20:10

## 2022-02-15 RX ADMIN — PROPOFOL 50 MG: 10 INJECTION, EMULSION INTRAVENOUS at 13:47

## 2022-02-15 RX ADMIN — Medication 1 MG: at 21:17

## 2022-02-15 RX ADMIN — OXYCODONE HYDROCHLORIDE 10 MG: 10 TABLET ORAL at 18:17

## 2022-02-15 RX ADMIN — ACETAMINOPHEN 975 MG: 325 TABLET, FILM COATED ORAL at 12:37

## 2022-02-15 RX ADMIN — ONDANSETRON 4 MG: 2 INJECTION INTRAMUSCULAR; INTRAVENOUS at 17:06

## 2022-02-15 RX ADMIN — LABETALOL HYDROCHLORIDE 10 MG: 5 INJECTION, SOLUTION INTRAVENOUS at 17:47

## 2022-02-15 RX ADMIN — CLINDAMYCIN PHOSPHATE 900 MG: 900 INJECTION, SOLUTION INTRAVENOUS at 13:26

## 2022-02-15 RX ADMIN — Medication 5 MG: at 14:49

## 2022-02-15 RX ADMIN — HYDROMORPHONE HYDROCHLORIDE 0.5 MG: 1 INJECTION, SOLUTION INTRAMUSCULAR; INTRAVENOUS; SUBCUTANEOUS at 17:07

## 2022-02-15 RX ADMIN — BUSPIRONE HYDROCHLORIDE 10 MG: 5 TABLET ORAL at 20:10

## 2022-02-15 RX ADMIN — REMIFENTANIL HYDROCHLORIDE 0.1 MCG/KG/MIN: 1 INJECTION, POWDER, LYOPHILIZED, FOR SOLUTION INTRAVENOUS at 13:42

## 2022-02-15 RX ADMIN — DEXAMETHASONE SODIUM PHOSPHATE 10 MG: 4 INJECTION, SOLUTION INTRA-ARTICULAR; INTRALESIONAL; INTRAMUSCULAR; INTRAVENOUS; SOFT TISSUE at 13:22

## 2022-02-15 RX ADMIN — SODIUM CHLORIDE, POTASSIUM CHLORIDE, SODIUM LACTATE AND CALCIUM CHLORIDE: 600; 310; 30; 20 INJECTION, SOLUTION INTRAVENOUS at 18:27

## 2022-02-15 RX ADMIN — BACITRACIN ZINC: 500 OINTMENT TOPICAL at 21:17

## 2022-02-15 RX ADMIN — MIDAZOLAM 2 MG: 1 INJECTION INTRAMUSCULAR; INTRAVENOUS at 13:03

## 2022-02-15 RX ADMIN — Medication 5 MG: at 13:33

## 2022-02-15 RX ADMIN — HYDROMORPHONE HYDROCHLORIDE 0.2 MG: 1 INJECTION, SOLUTION INTRAMUSCULAR; INTRAVENOUS; SUBCUTANEOUS at 18:01

## 2022-02-15 RX ADMIN — PROPOFOL 80 MG: 10 INJECTION, EMULSION INTRAVENOUS at 16:41

## 2022-02-15 RX ADMIN — PHENYLEPHRINE HYDROCHLORIDE 50 MCG: 10 INJECTION INTRAVENOUS at 16:33

## 2022-02-15 ASSESSMENT — ACTIVITIES OF DAILY LIVING (ADL)
ADLS_ACUITY_SCORE: 4
ADLS_ACUITY_SCORE: 6
ADLS_ACUITY_SCORE: 6
ADLS_ACUITY_SCORE: 4
ADLS_ACUITY_SCORE: 3
ADLS_ACUITY_SCORE: 4
ADLS_ACUITY_SCORE: 1
ADLS_ACUITY_SCORE: 4
ADLS_ACUITY_SCORE: 6

## 2022-02-15 ASSESSMENT — MIFFLIN-ST. JEOR: SCORE: 1493.5

## 2022-02-15 NOTE — ANESTHESIA CARE TRANSFER NOTE
Patient: Flaco Willis III    Procedure: Procedure(s):  left radical tonsillectomy, nasogastric tube placement  left modified radical neck dissection.       Diagnosis: Malignant neoplasm of oropharynx (H) [C10.9]  Diagnosis Additional Information: No value filed.    Anesthesia Type:   General     Note:    Oropharynx: oropharynx clear of all foreign objects and spontaneously breathing  Level of Consciousness: awake  Oxygen Supplementation: face mask  Level of Supplemental Oxygen (L/min / FiO2): 6  Independent Airway: airway patency satisfactory and stable  Dentition: dentition unchanged  Vital Signs Stable: post-procedure vital signs reviewed and stable  Report to RN Given: handoff report given  Patient transferred to: PACU    Handoff Report: Identifed the Patient, Identified the Reponsible Provider, Reviewed the pertinent medical history, Discussed the surgical course, Reviewed Intra-OP anesthesia mangement and issues during anesthesia, Set expectations for post-procedure period and Allowed opportunity for questions and acknowledgement of understanding      Vitals:  Vitals Value Taken Time   /90 02/15/22 1730   Temp 36.6  C (97.9  F) 02/15/22 1723   Pulse 121 02/15/22 1732   Resp 16 02/15/22 1723   SpO2 98 % 02/15/22 1732   Vitals shown include unvalidated device data.    Electronically Signed By: WOLFGANG Haney CRNA  February 15, 2022  5:33 PM

## 2022-02-15 NOTE — ANESTHESIA PREPROCEDURE EVALUATION
Anesthesia Pre-Procedure Evaluation    Patient: Flaco Willis III   MRN: [id number] : 1955        Preoperative Diagnosis: Malignant neoplasm of oropharynx (H) [C10.9]    Procedure : Procedure(s):  left radical tonsillectomy  left modified radical neck dissection, nasogastric tube placement          Past Medical History:   Diagnosis Date     ADD (attention deficit disorder)      Anxiety      Crohn's disease (H)      Hyperlipidemia      Hypertension      Liposarcoma of lower extremity (H)       Past Surgical History:   Procedure Laterality Date     EXCISE SOFT TISSUE TUMOR THIGH  2013    Procedure: EXCISE SOFT TISSUE TUMOR THIGH;  Excision of Tumor Bed Left Medial Thigh;  Surgeon: Seymour Jesus MD;  Location: [facility] OR     HERNIA REPAIR       tumor removal of thigh[       vocal cord surgery[       WRIST SURGERY        Allergies   Allergen Reactions     Augmentin Rash      Social History     Tobacco Use     Smoking status: Former Smoker     Types: Cigars     Start date: 1973     Quit date: 1990     Years since quittin.1     Smokeless tobacco: Never Used   Substance Use Topics     Alcohol use: Yes      Wt Readings from Last 1 Encounters:   22 75.8 kg (167 lb)        Anesthesia Evaluation   Pt has had prior anesthetic. Type: General.    No history of anesthetic complications       ROS/MED HX  ENT/Pulmonary: Comment:   Left tonsillar cancer  Vocal cord polypectomy   See Dr. Valladares's note from 22 for photos from flex fiberoptic laryngoscopy      Neurologic:  - neg neurologic ROS     Cardiovascular:     (+) hypertension-----    METS/Exercise Tolerance:     Hematologic:  - neg hematologic  ROS     Musculoskeletal:  - neg musculoskeletal ROS     GI/Hepatic: Comment: Crohn's disease      Renal/Genitourinary:  - neg Renal ROS     Endo:  - neg endo ROS     Psychiatric/Substance Use:     (+) psychiatric history anxiety (ADHD)     Infectious Disease:  - neg infectious disease ROS      Malignancy: Comment: Tonsillar cancer      Other:               OUTSIDE LABS:  CBC:   Lab Results   Component Value Date    WBC 8.1 12/29/2021    WBC 6.6 09/21/2013    HGB 17.3 12/29/2021    HGB 16.3 09/21/2013    HCT 50.5 12/29/2021    HCT 46.5 09/21/2013     12/29/2021     09/21/2013     BMP:   Lab Results   Component Value Date     12/29/2021     09/21/2013    POTASSIUM 3.7 12/29/2021    POTASSIUM 3.9 09/21/2013    CHLORIDE 108 12/29/2021    CHLORIDE 105 09/21/2013    CO2 22 12/29/2021    CO2 24 09/21/2013    BUN 11 12/29/2021    BUN 17 09/21/2013    CR 0.82 12/29/2021    CR 0.73 09/21/2013     (H) 12/29/2021    GLC 99 09/21/2013     COAGS: No results found for: PTT, INR, FIBR  POC: No results found for: BGM, HCG, HCGS  HEPATIC:   Lab Results   Component Value Date    ALBUMIN 4.2 12/29/2021    PROTTOTAL 7.8 12/29/2021    ALT 31 12/29/2021    AST 14 12/29/2021    ALKPHOS 85 12/29/2021    BILITOTAL 1.2 12/29/2021     OTHER:   Lab Results   Component Value Date    KATIUSKA 9.0 12/29/2021    LIPASE 118 12/29/2021       Anesthesia Plan    ASA Status:  3   NPO Status:  NPO Appropriate    Anesthesia Type: General.     - Airway: ETT   Induction: Intravenous.   Maintenance: Balanced.   Techniques and Equipment:     - Airway: Video-Laryngoscope     - Lines/Monitors: 2nd IV, BIS     Consents            Postoperative Care    Pain management: IV analgesics, Oral pain medications, Multi-modal analgesia.   PONV prophylaxis: Ondansetron (or other 5HT-3), Dexamethasone or Solumedrol     Comments:                Clementine Ruiz MD

## 2022-02-15 NOTE — BRIEF OP NOTE
[facility]    Brief Operative Note    Pre-operative diagnosis: Malignant neoplasm of oropharynx (H) [C10.9]  Post-operative diagnosis Same as pre-operative diagnosis    Procedure: Procedure(s):  left radical tonsillectomy, nasogastric tube placement  left modified radical neck dissection.  Surgeon: Surgeon(s) and Role:     * Summer Valladares MD - Primary     * Klever Rodriguez MD - Resident - Assisting     * Bear Aparicio MD - Resident - Assisting  Anesthesia: General   Estimated Blood Loss: Less than 100 ml    Drains: Esdras-Devlin  Specimens:   ID Type Source Tests Collected by Time Destination   1 : Superior tonsil mucosa Tissue Other SURGICAL PATHOLOGY EXAM Summer Valladares MD 2/15/2022  2:04 PM    2 : midline posterior pharyngeal wall mucosa Tissue Other SURGICAL PATHOLOGY EXAM Summer Valladares MD 2/15/2022  2:04 PM    3 : soft palate mucosal wall margin Tissue Other SURGICAL PATHOLOGY EXAM Summer Valladares MD 2/15/2022  2:04 PM    4 : lateral pharyngeal wall margin Tissue Other SURGICAL PATHOLOGY EXAM Summer Valladares MD 2/15/2022  2:04 PM    5 : inferior tonsillar mucosal margin Tissue Other SURGICAL PATHOLOGY EXAM Summer Valladares MD 2/15/2022  2:05 PM    6 : basal tongue mucosal margin Tissue Other SURGICAL PATHOLOGY EXAM Summer Valladares MD 2/15/2022  2:05 PM    7 : superior constrictor Tissue Other SURGICAL PATHOLOGY EXAM Summer Valladares MD 2/15/2022  2:05 PM    8 : deep basal tongue margin Tissue Other SURGICAL PATHOLOGY EXAM Summer Valladares MD 2/15/2022  2:05 PM    9 : Left radical tonsillectomy; stitch on superior Tissue Other SURGICAL PATHOLOGY EXAM Summer Valladares MD 2/15/2022  2:06 PM    10 : LEFT LEVEL 2A Tissue Other SURGICAL PATHOLOGY EXAM Summer Valladares MD 2/15/2022  3:23 PM    11 : LEFT LEVEL 3 Tissue Other SURGICAL PATHOLOGY EXAM Summer Valladares MD 2/15/2022  3:25 PM    12 : LEFT LEVEL 4 Tissue Other  SURGICAL PATHOLOGY EXAM Summer Valladares MD 2/15/2022  3:30 PM    13 : LEFT LEVEL 2B Tissue Other SURGICAL PATHOLOGY EXAM Summer Valladares MD 2/15/2022  3:33 PM    14 : Additional inferior tonsil  -stitch superior Tissue Tonsil, Left SURGICAL PATHOLOGY EXAM Summer Valladares MD 2/15/2022  4:53 PM      Findings:   as expected.  Complications: None.  Implants: * No implants in log *      Plan going forward:   66M s/p left radical tonsillectomy and neck dissection (levels II-IV) on 2/15.     Neuro: tylenol, oxycodone, dilaudid, PTA escitalopram    HEENT: peridex. JAYNA cares. Incision cares.     CV: [id number] held. PTA lipitor.     Resp: GEORGE    GI: clear liquids, NG placed, TF started, Nutrition consult, SLP consult  Titrating LR, bowel regimen.     : singleton removed. PTA Finasteride.     Heme/Endo: GEORGE    Ppx: SCD    Consults: PT, OT, Nutrition, SLP    Dispo: pending clinical improvement     Bear Aparicio MD   Otolaryngology-Head & Neck Surgery PGY4  Please contact ENT with questions by dialing [phone] and entering job code [phone] when prompted.

## 2022-02-15 NOTE — OP NOTE
Date of Procedure: 2/15/2021    Attending Physician: Summer Valladares MD    Resident Physicians: Rhys Rodriguez MD    Procedure Performed:  Radical tonsillectomy  Neck dissection  NG tube placement  Direct laryngoscopy    Preoperative Diagnosis:   1. Squamous cell carcinoma of oropharynx  2. Secondary malignancy of lymph nodes    Postoperative Diagnosis: same    Anesthesia: General    Blood loss: 15 cc    Specimens:   ID Type Source Tests Collected by Time Destination   1 : Superior tonsil mucosa Tissue Other SURGICAL PATHOLOGY EXAM Summer Valladares MD 2/15/2022  2:04 PM     2 : midline posterior pharyngeal wall mucosa Tissue Other SURGICAL PATHOLOGY EXAM Summer Valladares MD 2/15/2022  2:04 PM     3 : soft palate mucosal wall margin Tissue Other SURGICAL PATHOLOGY EXAM Summer Valladares MD 2/15/2022  2:04 PM     4 : lateral pharyngeal wall margin Tissue Other SURGICAL PATHOLOGY EXAM Summer Valladares MD 2/15/2022  2:04 PM     5 : inferior tonsillar mucosal margin Tissue Other SURGICAL PATHOLOGY EXAM Summer Valladares MD 2/15/2022  2:05 PM     6 : basal tongue mucosal margin Tissue Other SURGICAL PATHOLOGY EXAM Summer Valladares MD 2/15/2022  2:05 PM     7 : superior constrictor Tissue Other SURGICAL PATHOLOGY EXAM Summer Valladares MD 2/15/2022  2:05 PM     8 : deep basal tongue margin Tissue Other SURGICAL PATHOLOGY EXAM Summer Valladares MD 2/15/2022  2:05 PM     9 : Left radical tonsillectomy; stitch on superior Tissue Other SURGICAL PATHOLOGY EXAM Summer Valladares MD 2/15/2022  2:06 PM     10 : LEFT LEVEL 2A Tissue Other SURGICAL PATHOLOGY EXAM Summer Valladares MD 2/15/2022  3:23 PM     11 : LEFT LEVEL 3 Tissue Other SURGICAL PATHOLOGY EXAM Summer Valladares MD 2/15/2022  3:25 PM     12 : LEFT LEVEL 4 Tissue Other SURGICAL PATHOLOGY EXAM Summer Valladares MD 2/15/2022  3:30 PM     13 : LEFT LEVEL 2B Tissue Other SURGICAL PATHOLOGY EXAM Summer Valladares MD 2/15/2022  3:33 PM     14 :  Additional inferior tonsil  -stitch superior Tissue Tonsil, Left SURGICAL PATHOLOGY EXAM Summer Valladares MD 2/15/2022  4:53 PM          Implants:  NG tube  JAYNA drain x 1    Complications: None    Findings:  Frozen section margin positive at inferior tonsillar mucosa  No obvious tumor on transection of left tonsil on frozen section  Additional resection taken along left inferior tonsil/base of tongue  Grossly abnormal 2 cm node present in level II, no obvious IMANI  Clipped candidate for lingual and facial artery but aberrant anatomy identified  NG tube placed    Indications:  Flaco Willis is a 66 year old man with a 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 He developed a left neck mass which was evaluated by his PCP and then referred to his local ENT. He had a CT neck performed on 11/16/2021 which showed a 2.5 x 2.0 cm neck mass. He had an U/S of the neck performed on 12/8/2021 which showed a 3.1 x 2.3 x 1.7 cm left neck mass.  He had an FNA of the neck mass on 12/8/2021 which showed p16+ SCC. He had a PET scan on 1/4/2022 which showed focal uptake in the left tonsil with no obvious CT correlate, 2 cm FDG avid left level IIA node, no distant disease. He saw medical oncology at [facility] and was recommended for radiation +/- chemotherapy, recommended against surgery due to the need for postoperative chemoradiation. He then transferred his care to the [facility]. He saw Dr Friedman on 1/19/2022 who discussed various treatment options with him. His case was discussed at tumor board with recommendations for surgery alone vs radiation alone, patient elected for surgery with radical tonsillectomy and neck dissection.       Description of Procedure:  After informed consent was obtained, the patient was brought back to the main operating room and placed in a supine position.  General anesthesia was induced and the patient was orotracheally intubated.  The bed was turned 180 degrees away from anesthesia.  The arms were tucked.  A time out was performed. The Eli mouth gag was placed and used to visualize the left tonsil and the patient was placed into suspension. Under headlight visualization, the monopolar cautery was used to tip the mucosal incisions which extended just medial to the uvula on the left side, extending along the soft palate, toward the lateral pharyngeal wall to encompass the anterior tonsillar pillar down onto the base of the tongue and then along the posterior pharyngeal wall.  The monopolar cautery was then used to extend our mucosal cuts through the soft palate laterally towards the lateral pharyngeal wall along the pterygomandibular raphe.  Posteriorly, the pharyngeal wall was incised and the prevertebral fascia was identified.  From here, the tonsil with the anterior and posterior tonsillar pillars was resected going down towards the posterior pharyngeal wall.  The superior constrictor muscle was divided and the prevertebral fascia was identified.  Laterally we extended our dissection down to the parapharyngeal fat leaving this in place. The stylopharyngeus muscle was divided.  We then continued to release the tonsil from the prevertebral fascia down towards the base of tongue.  The base of tongue cuts were extended through the tongue musculature and connected towards our incision along the posterior pharyngeal wall.  An orientation suture was placed and the specimen was taken to pathology for orientation. Mucosal margins were sent from circumferentially around the specimen as well as the deep margins which were all negative for carcinoma or dysplasia with the exception of the left inferior tonsil margin being positive for malignancy. The oropharynx and nasopharynx was thoroughly irrigated with saline. Hemostasis was achieved with bipolar cautery. Of note, the patient had a number of small veins along the defect which were bipolared.     The [facility] facial nerve monitors were placed to monitor the lower lip. The  patient was prepped and draped for a left neck dissection.  A timeout was performed.  The planned incision was marked parallel to a skin crease, 3 finger widths below the mandible in a skin crease. This was injected with 1% lidocaine with 1:100,000 epinephrine.  A 15 blade was used to make an incision down to the skin to the level of the platysma.  The platysma was divided using the monopolar cautery.  The external jugular vein was preserved.  Skin flaps were raised superiorly up to the mandible and inferiorly to the level of the clavicle. The inferior border of the submandibular gland was defined and the digastric was identified. This was traced back toward the external jugular vein. The digastric was traced back toward the mastoid tip, defining the superior border of the dissection.The fascia was released off the SCM, just medial to the external jugular vein. The fascia was unrolled working from lateral to medial off the SCM.  The preoperatively identified debbi disease was identified in level II, just inferior to the digastric muscle.  The fascia was continued to be unrolled off the SCM towards the floor of the neck. The spinal accessory nerve was identified and traced superiorly towards its junction with internal jugular vein.  Once this was identified, the spinal accessory nerve was retracted superiorly. The fibrofatty contents of level II, III, IV were then continued to be released towards the floor of the neck.  The rootlets were identified along the floor and were preserved. The omohyoid was retracted inferiorly down toward the clavicle. The lateral border of the internal jugular vein was defined inferiorly. The fibrofatty contents of levels II-IV were released from the carotid sheath and then off the internal jugular vein using a 15 blade. As the specimen was released at the inferior border it was preemptively clipped to reduce the risk of a chyle leak. The medial border of our dissection was identified  along the lateral border of the omohyoid and strap muscles.  The specimen was released off the medial neck, preserving the superior thyroid vessels. The hypoglossal nerve was identified and was preserved. The lateral and medial aspects of the neck dissection were connected and released off the internal jugular vein using sharp dissection with a 15 blade.  The specimen was divided into its respective levels and sent for permanent pathology. The neck was inspected and there was no obvious chyle leak.The spinal accessory nerve was further dissected toward the internal jugular vein. The contents of level IIB were released off the floor of the neck and SCM laterally with Metzenbaum scissors and handed off to nursing for permanent pathology. Hemostasis was achieved with bipolar cautery. The submandibular gland was retracted superiorly and the digastric muscle was retracted inferiorly. The external carotid was identified and traced superiorly. The patient had a small diameter lingual artery which was clipped. The patient's facial artery was of aberrant course, traveled along the inferior border of the digastric. This was similarly clipped. The external carotid was dissected superiorly further with no other branches identified.      The neck was irrigated and hemostasis was achieved. A valsalva was performed multiple times while obtaining hemostasis. No chyle leak was identified.  A JAYNA drain was placed in the neck and secured with 3-0 nylon suture. The skin was closed with a buried interrupted 3-0 vicryl in the platysma and dermis and a running 5-0 prolene. Bacitracin was applied to the incision.    The Eli was replaced within the oral cavity and the oropharynx was visualized. The monopolar cautery was used to perform an additional resection along the inferior aspect of the defect, along the posterior/lateral pharyngeal wall and base of tongue. An orientation suture was placed. The specimen was taken to pathology for  orientation. Hemostasis was achieved with bipolar cautery.      The oropharynx was inspected with the Dedo laryngoscope with no bleeding identified. The laryngoscope was advanced into the larynx. A feeding tube was placed under direct visualization with the Dedo laryngoscope and secured with a 2-0 silk suture. The patient was handed over to anesthesia for extubation. He was taken to the PACU in stable condition with no immediate complications.    I was present for and participated in the entire procedure.    Summer Valladares MD    Department of Otolaryngology

## 2022-02-16 ENCOUNTER — HOME INFUSION (PRE-WILLOW HOME INFUSION) (OUTPATIENT)
Dept: PHARMACY | Facility: CLINIC | Age: 67
End: 2022-02-16
Payer: COMMERCIAL

## 2022-02-16 ENCOUNTER — APPOINTMENT (OUTPATIENT)
Dept: OCCUPATIONAL THERAPY | Facility: CLINIC | Age: 67
DRG: 141 | End: 2022-02-16
Attending: OTOLARYNGOLOGY
Payer: COMMERCIAL

## 2022-02-16 ENCOUNTER — APPOINTMENT (OUTPATIENT)
Dept: SPEECH THERAPY | Facility: CLINIC | Age: 67
DRG: 141 | End: 2022-02-16
Attending: OTOLARYNGOLOGY
Payer: COMMERCIAL

## 2022-02-16 LAB
ALBUMIN SERPL-MCNC: 2.8 G/DL (ref 3.4–5)
ALP SERPL-CCNC: 55 U/L (ref 40–150)
ALT SERPL W P-5'-P-CCNC: 25 U/L (ref 0–70)
ANION GAP SERPL CALCULATED.3IONS-SCNC: 6 MMOL/L (ref 3–14)
AST SERPL W P-5'-P-CCNC: 19 U/L (ref 0–45)
BILIRUB SERPL-MCNC: 1.2 MG/DL (ref 0.2–1.3)
BUN SERPL-MCNC: 13 MG/DL (ref 7–30)
CALCIUM SERPL-MCNC: 8.4 MG/DL (ref 8.5–10.1)
CHLORIDE BLD-SCNC: 107 MMOL/L (ref 94–109)
CO2 SERPL-SCNC: 24 MMOL/L (ref 20–32)
CREAT SERPL-MCNC: 0.7 MG/DL (ref 0.66–1.25)
ERYTHROCYTE [DISTWIDTH] IN BLOOD BY AUTOMATED COUNT: 12.7 % (ref 10–15)
GFR SERPL CREATININE-BSD FRML MDRD: >90 ML/MIN/1.73M2
GLUCOSE BLD-MCNC: 133 MG/DL (ref 70–99)
GLUCOSE BLDC GLUCOMTR-MCNC: 134 MG/DL (ref 70–99)
HCT VFR BLD AUTO: 45.6 % (ref 40–53)
HGB BLD-MCNC: 15.4 G/DL (ref 13.3–17.7)
HOLD SPECIMEN: NORMAL
MAGNESIUM SERPL-MCNC: 1.8 MG/DL (ref 1.8–2.6)
MAGNESIUM SERPL-MCNC: 1.9 MG/DL (ref 1.8–2.6)
MCH RBC QN AUTO: 32.5 PG (ref 26.5–33)
MCHC RBC AUTO-ENTMCNC: 33.8 G/DL (ref 31.5–36.5)
MCV RBC AUTO: 96 FL (ref 78–100)
PHOSPHATE SERPL-MCNC: 3.1 MG/DL (ref 2.5–4.5)
PHOSPHATE SERPL-MCNC: 3.3 MG/DL (ref 2.5–4.5)
PLATELET # BLD AUTO: 197 10E3/UL (ref 150–450)
POTASSIUM BLD-SCNC: 4 MMOL/L (ref 3.4–5.3)
POTASSIUM BLD-SCNC: 4.1 MMOL/L (ref 3.4–5.3)
PROT SERPL-MCNC: 5.7 G/DL (ref 6.8–8.8)
RBC # BLD AUTO: 4.74 10E6/UL (ref 4.4–5.9)
SODIUM SERPL-SCNC: 137 MMOL/L (ref 133–144)
WBC # BLD AUTO: 15.3 10E3/UL (ref 4–11)

## 2022-02-16 PROCEDURE — 97165 OT EVAL LOW COMPLEX 30 MIN: CPT | Mod: GO | Performed by: OCCUPATIONAL THERAPIST

## 2022-02-16 PROCEDURE — 84132 ASSAY OF SERUM POTASSIUM: CPT | Performed by: OTOLARYNGOLOGY

## 2022-02-16 PROCEDURE — 120N000002 HC R&B MED SURG/OB UMMC

## 2022-02-16 PROCEDURE — 83735 ASSAY OF MAGNESIUM: CPT | Performed by: STUDENT IN AN ORGANIZED HEALTH CARE EDUCATION/TRAINING PROGRAM

## 2022-02-16 PROCEDURE — 84132 ASSAY OF SERUM POTASSIUM: CPT | Performed by: STUDENT IN AN ORGANIZED HEALTH CARE EDUCATION/TRAINING PROGRAM

## 2022-02-16 PROCEDURE — 97535 SELF CARE MNGMENT TRAINING: CPT | Mod: GO | Performed by: OCCUPATIONAL THERAPIST

## 2022-02-16 PROCEDURE — 36415 COLL VENOUS BLD VENIPUNCTURE: CPT | Performed by: STUDENT IN AN ORGANIZED HEALTH CARE EDUCATION/TRAINING PROGRAM

## 2022-02-16 PROCEDURE — 97530 THERAPEUTIC ACTIVITIES: CPT | Mod: GO | Performed by: OCCUPATIONAL THERAPIST

## 2022-02-16 PROCEDURE — 250N000013 HC RX MED GY IP 250 OP 250 PS 637: Performed by: STUDENT IN AN ORGANIZED HEALTH CARE EDUCATION/TRAINING PROGRAM

## 2022-02-16 PROCEDURE — 92526 ORAL FUNCTION THERAPY: CPT | Mod: GN

## 2022-02-16 PROCEDURE — 97110 THERAPEUTIC EXERCISES: CPT | Mod: GO | Performed by: OCCUPATIONAL THERAPIST

## 2022-02-16 PROCEDURE — 999N000147 HC STATISTIC PT IP EVAL DEFER: Performed by: PHYSICAL THERAPIST

## 2022-02-16 PROCEDURE — 92610 EVALUATE SWALLOWING FUNCTION: CPT | Mod: GN

## 2022-02-16 PROCEDURE — 85027 COMPLETE CBC AUTOMATED: CPT | Performed by: STUDENT IN AN ORGANIZED HEALTH CARE EDUCATION/TRAINING PROGRAM

## 2022-02-16 PROCEDURE — 250N000013 HC RX MED GY IP 250 OP 250 PS 637

## 2022-02-16 PROCEDURE — 84100 ASSAY OF PHOSPHORUS: CPT | Performed by: STUDENT IN AN ORGANIZED HEALTH CARE EDUCATION/TRAINING PROGRAM

## 2022-02-16 RX ORDER — GUAIFENESIN 600 MG/1
15 TABLET, EXTENDED RELEASE ORAL DAILY
Status: DISCONTINUED | OUTPATIENT
Start: 2022-02-16 | End: 2022-02-19 | Stop reason: HOSPADM

## 2022-02-16 RX ORDER — LANOLIN ALCOHOL/MO/W.PET/CERES
3 CREAM (GRAM) TOPICAL
Status: DISCONTINUED | OUTPATIENT
Start: 2022-02-16 | End: 2022-02-19 | Stop reason: HOSPADM

## 2022-02-16 RX ORDER — ZOLPIDEM TARTRATE 5 MG/1
5 TABLET ORAL
Status: DISCONTINUED | OUTPATIENT
Start: 2022-02-16 | End: 2022-02-18

## 2022-02-16 RX ADMIN — CHLORHEXIDINE GLUCONATE 0.12% ORAL RINSE 10 ML: 1.2 LIQUID ORAL at 12:08

## 2022-02-16 RX ADMIN — BACITRACIN ZINC: 500 OINTMENT TOPICAL at 20:16

## 2022-02-16 RX ADMIN — ACETAMINOPHEN 975 MG: 325 TABLET, FILM COATED ORAL at 20:15

## 2022-02-16 RX ADMIN — OXYCODONE HYDROCHLORIDE 10 MG: 10 TABLET ORAL at 20:15

## 2022-02-16 RX ADMIN — Medication 1 MG: at 17:47

## 2022-02-16 RX ADMIN — Medication 1 MG: at 09:23

## 2022-02-16 RX ADMIN — FINASTERIDE 1 MG: 1 TABLET, FILM COATED ORAL at 20:15

## 2022-02-16 RX ADMIN — ATORVASTATIN CALCIUM 20 MG: 20 TABLET, FILM COATED ORAL at 20:16

## 2022-02-16 RX ADMIN — ZOLPIDEM TARTRATE 5 MG: 5 TABLET ORAL at 01:41

## 2022-02-16 RX ADMIN — BACITRACIN ZINC: 500 OINTMENT TOPICAL at 04:50

## 2022-02-16 RX ADMIN — OXYCODONE HYDROCHLORIDE 10 MG: 10 TABLET ORAL at 16:04

## 2022-02-16 RX ADMIN — OXYCODONE HYDROCHLORIDE 10 MG: 10 TABLET ORAL at 07:46

## 2022-02-16 RX ADMIN — OXYCODONE HYDROCHLORIDE 10 MG: 10 TABLET ORAL at 02:35

## 2022-02-16 RX ADMIN — ZOLPIDEM TARTRATE 5 MG: 5 TABLET ORAL at 22:27

## 2022-02-16 RX ADMIN — OXYCODONE HYDROCHLORIDE 10 MG: 10 TABLET ORAL at 12:08

## 2022-02-16 RX ADMIN — BACITRACIN ZINC: 500 OINTMENT TOPICAL at 12:09

## 2022-02-16 RX ADMIN — CHLORHEXIDINE GLUCONATE 0.12% ORAL RINSE 10 ML: 1.2 LIQUID ORAL at 16:04

## 2022-02-16 RX ADMIN — BUSPIRONE HYDROCHLORIDE 10 MG: 5 TABLET ORAL at 07:47

## 2022-02-16 RX ADMIN — ACETAMINOPHEN 975 MG: 325 TABLET, FILM COATED ORAL at 05:47

## 2022-02-16 RX ADMIN — CHLORHEXIDINE GLUCONATE 0.12% ORAL RINSE 10 ML: 1.2 LIQUID ORAL at 07:47

## 2022-02-16 RX ADMIN — WHITE PETROLATUM: 1.75 OINTMENT TOPICAL at 18:31

## 2022-02-16 RX ADMIN — ESCITALOPRAM OXALATE 10 MG: 10 TABLET ORAL at 07:47

## 2022-02-16 RX ADMIN — CHLORHEXIDINE GLUCONATE 0.12% ORAL RINSE 10 ML: 1.2 LIQUID ORAL at 20:14

## 2022-02-16 RX ADMIN — Medication 15 ML: at 09:21

## 2022-02-16 RX ADMIN — ACETAMINOPHEN 975 MG: 325 TABLET, FILM COATED ORAL at 12:08

## 2022-02-16 ASSESSMENT — ACTIVITIES OF DAILY LIVING (ADL)
ADLS_ACUITY_SCORE: 6
ADLS_ACUITY_SCORE: 6
ADLS_ACUITY_SCORE: 4
ADLS_ACUITY_SCORE: 6
ADLS_ACUITY_SCORE: 4
ADLS_ACUITY_SCORE: 6
ADLS_ACUITY_SCORE: 6
ADLS_ACUITY_SCORE: 4
ADLS_ACUITY_SCORE: 6
ADLS_ACUITY_SCORE: 4
ADLS_ACUITY_SCORE: 4
ADLS_ACUITY_SCORE: 6
ADLS_ACUITY_SCORE: 6
ADLS_ACUITY_SCORE: 4
PREVIOUS_RESPONSIBILITIES: MEAL PREP;MEDICATION MANAGEMENT;FINANCES;DRIVING;WORK
DEPENDENT_IADLS:: INDEPENDENT
ADLS_ACUITY_SCORE: 6

## 2022-02-16 NOTE — ANESTHESIA POSTPROCEDURE EVALUATION
Patient: Flaco Willis III    Procedure: Procedure(s):  left radical tonsillectomy, nasogastric tube placement  left modified radical neck dissection.       Diagnosis:Malignant neoplasm of oropharynx (H) [C10.9]  Diagnosis Additional Information: No value filed.    Anesthesia Type:  General    Note:  Disposition: Admission   Postop Pain Control: Uneventful            Sign Out: Well controlled pain   PONV: No   Neuro/Psych: Uneventful            Sign Out: Acceptable/Baseline neuro status   Airway/Respiratory: Uneventful            Sign Out: Acceptable/Baseline resp. status   CV/Hemodynamics: Uneventful            Sign Out: Acceptable CV status; No obvious hypovolemia; No obvious fluid overload   Other NRE: NONE   DID A NON-ROUTINE EVENT OCCUR? No           Last vitals:  Vitals Value Taken Time   /86 02/15/22 1845   Temp 37.1  C (98.7  F) 02/15/22 1845   Pulse 95 02/15/22 1850   Resp 16 02/15/22 1845   SpO2 96 % 02/15/22 1851   Vitals shown include unvalidated device data.    Electronically Signed By: Meet Cabral MD  February 15, 2022  6:55 PM

## 2022-02-16 NOTE — PROGRESS NOTES
02/16/22 0931   General Information   Onset of Illness/Injury or Date of Surgery 02/15/22   Referring Physician Bear Aparicio MD   Patient/Family Therapy Goal Statement (SLP) None stated   Pertinent History of Current Problem Pt is a 66M s/p left radical tonsillectomy and neck dissection (levels II-IV) on 2/15. Clinical swallow eval completed per MD order. Per discussion with ENT resident, pt OK for up to mech soft diet.   General Observations Alert and agreeable   Past History of Dysphagia None per pt report or chart review   Type of Evaluation   Type of Evaluation Swallow Evaluation   Oral Motor   Oral Musculature generally intact   Structural Abnormalities none present   Mucosal Quality good   Dentition (Oral Motor)   Dentition (Oral Motor) natural dentition   Facial Symmetry (Oral Motor)   Facial Symmetry (Oral Motor) WNL   Lip Function (Oral Motor)   Lip Range of Motion (Oral Motor) WNL   Tongue Function (Oral Motor)   Tongue ROM (Oral Motor) WNL   Jaw Function (Oral Motor)   Jaw Function (Oral Motor) WNL   Cough/Swallow/Gag Reflex (Oral Motor)   Comment, Cough/Swallow/Gag Reflex (Oral Motor) adequate   Vocal Quality/Secretion Management (Oral Motor)   Comment, Vocal Quality/Secretion Management (Oral Motor) hoarse - pt reports his vocal quality is below baseline at this time   General Swallowing Observations   Current Diet/Method of Nutritional Intake (General Swallowing Observations, NIS) thin liquids  (clears per ENT)   Respiratory Support (General Swallowing Observations) nasal cannula   Swallowing Evaluation Clinical swallow evaluation   Clinical Swallow Evaluation   Feeding Assistance no assistance needed   Clinical Swallow Evaluation Textures Trialed thin liquids;pureed;solid foods   Clinical Swallow Eval: Thin Liquid Texture Trial   Mode of Presentation, Thin Liquids straw;self-fed   Volume of Liquid or Food Presented 6oz thin water   Oral Phase of Swallow WFL   Pharyngeal Phase of Swallow intact    Diagnostic Statement WFL, no overt s/sx of aspiration - pt noted to have throat clear throughout evaluation with or without PO   Clinical Swallow Evaluation: Puree Solid Texture Trial   Mode of Presentation, Puree spoon;self-fed   Volume of Puree Presented 2oz pudding   Oral Phase, Puree WFL   Pharyngeal Phase, Puree intact   Diagnostic Statement WFL, no overt s/sx of aspiration   Clinical Swallow Evaluation: Solid Food Texture Trial   Mode of Presentation self-fed   Volume Presented 1/2 cracker with pudding   Oral Phase WFL   Pharyngeal Phase intact   Diagnostic Statement WFL, no overt s/sx of aspiration   Esophageal Phase of Swallow   Patient reports or presents with symptoms of esophageal dysphagia No   Swallowing Recommendations   Diet Consistency Recommendations thin liquids  (soft diet per ENT)   Supervision Level for Intake patient independent   Mode of Delivery Recommendations bolus size, small;food moistened;slow rate of intake   Monitoring/Assistance Required (Eating/Swallowing) monitor for cough or change in vocal quality with intake   Recommended Feeding/Eating Techniques (Swallow Eval) maintain upright sitting position for eating   Medication Administration Recommendations, Swallowing (SLP) as tolerated   Instrumental Assessment Recommendations   (pending ENT tx plan)   Clinical Impression   Criteria for Skilled Therapeutic Interventions Met (SLP Eval) No problems identified which require skilled intervention   SLP Diagnosis Functional swallow mechanism   Risks & Benefits of therapy have been explained evaluation/treatment results reviewed;care plan/treatment goals reviewed;risks/benefits reviewed;current/potential barriers reviewed;participants voiced agreement with care plan;participants included;patient   Clinical Impression Comments   Clinical swallow eval completed per MD order. Pt presents with a functional oropharyngeal swallow mechanism s/p L radical tonsillectomy. Oral mech exam remarkable  for a subjective hoarse vocal quality per pt. Assessed with thin liquids, puree, and cracker with pudding. Oral phase WFL. Pharyngeal phase WFL, no overt s/sx of aspiration. Pt did endorse sensation of swelling in L throat but this did not impact his ability to swallow in his opinion. Pt denied any odynophagia. From SLP perspective, pt OK for mechanical soft diet/thin liquids. Pt should be upright for PO, taking small bites/sips at slow rate. No additional IP SLP services indicated.      SLP Discharge Planning   SLP Discharge Recommendation home with outpatient speech therapy   SLP Rationale for DC Rec F/u with SLP in ENT clinic pending tx plan for cancer   SLP Brief overview of current status  From SLP perspective, pt OK for mechanical soft diet/thin liquids. No additional IP SLP services indicated.     Total Evaluation Time   Total Evaluation Time (Minutes) 15   SLP Goals   Therapy Frequency (SLP Eval) one time eval and treatment only

## 2022-02-16 NOTE — PLAN OF CARE
Status: Pt s/p L tonsillectomy, L MRND, NG tube placement 2/15  Vitals: Hypertensive within parameters, 1-2L NC  Neuros: Intact ex slightly anxious at times (ativan prn available)  IV: PIV SL, adequate PO  Resp/trach: WML  Diet: Clear liquid diet, TF initiated @ 10 ml/hr @ 0630, can be advanced q4, next @ 1030. Delay d/t epic downtime, was unable to get electrolyte lab results  Bowel status: No BM since surgery  : Voiding  Skin: L neck incision with sutures, JAYNA x1 to neck  Pain: Oxycodone and tylenol effective  Activity: SBA  Plan: Continue with POC

## 2022-02-16 NOTE — PHARMACY-CONSULT NOTE
Pharmacy Tube Feeding Consult    Medication reviewed for administration by feeding tube and for potential food/drug interactions.    Recommendation: No changes are needed at this time.     Pharmacy will continue to follow as new medications are ordered.    Flaquita Perez, PharmD, BCPS  2/16/2022 1:30 PM

## 2022-02-16 NOTE — PROGRESS NOTES
CLINICAL NUTRITION SERVICES - ASSESSMENT NOTE     Nutrition Prescription    RECOMMENDATIONS FOR MDs/PROVIDERS TO ORDER:  - Total fluids/adjustments per Provider    Malnutrition Status:    - Moderate malnutrition in the context of acute illness      Recommendations already ordered by Registered Dietitian (RD):  - Modify TF orders to reflect new goal rate of 60 ml/hr on Osmolite 1.5. Regimen provides Osmolite 1.5 Luis @ goal of  60ml/hr  (1440ml/day)  will provide: 2160 kcals (29 kcals/kg), 90 g PRO (1.2g PRO/kg), 1097 ml free H20, 293 g CHO, and 0 g fiber daily.  - H20 flushes of 30 ml every 4 hrs for tube patency  -  Order multivitamin/mineral (Certavite 15 ml/day) via TF to help ensure micronutrient needs are met due sloe TF adv and potential for interruptions to infusion.  -  H2O flushes of 30 ml every 4 hrs.    Future/Additional Recommendations:  Recommendations for transitioning to bolus TFs (only if FT is gastric)    Once patient is tolerating continuous goal TF rate for at least 8 hrs, would transition to bolus TFs as follows; Stop continuous TF for 1-2 hrs to let stomach empty prior to starting gravity feeding. Then begin first bolus with 0.5 cans (125 ml) and if tolerates after approx 4 hrs without GI complaints and/or residuals < 500 ml, rec adv each subsequent bolus by 0.5 cans (125 ml) every ~4 hrs until reach goal regimen on Osmolite 1.5 of 2 cans TID which will provide 1422 kcals, 2133 kcals (28 kcals/kg), 90 g PRO (1.2 g/kg), 1083 ml, 289 g CHO and no Fiber daily    *Do not give feedings at night while patient asleep (during the day only).    Example: Change H2O flushes to 140 ml H2O before and after each bolus TID (to provide an addtl 840 ml H2O) to meet est fld needs.       REASON FOR ASSESSMENT  Flaco Willis III is a/an 66 year old male assessed by the dietitian for Admission Nutrition Risk Screen for positive and Provider Order - Registered Dietitian to Assess and Order TF per Medical Nutrition  "Therapy Protocol    NUTRITION HISTORY  Pt reports he was tolerating po and eating well PTA    CURRENT NUTRITION ORDERS  Diet: Clear Liquid  Intake/Tolerance: No meals ordered yet this admission.      Nutrition Support: orders entered by Provider to initiate TF via NGT with Osmolite 1.5 @ 10 ml/hr with adv by 10 ml every 4 hrs to goal of 45 ml/hr. Regimen provides 1080 ml/day, 1620 kcals (22 kcals/kg), 67 g PRO (0.9 g PRO/kg), 822 ml free H20, 219 g CHO, and 0 g fiber daily.  - Above TF regimen underfeeding pt.   - TF initiated @ 0630 today @ 10 ml/hr  .     LABS  K+, Mg++ and PO4 WNL    MEDICATIONS  MIVF's @ 75 ml/hr    ANTHROPOMETRICS  Height: 172.7 cm (5' 8\")  Most Recent Weight: 74.9 kg (165 lb 2 oz) - admit (2/15)  IBW: 70 kg  BMI: Overweight BMI 25-29.9  Weight History: Pt reports his UBW is betw 168 to 170 lbs  Wt Readings from Last 10 Encounters:   02/15/22 74.9 kg (165 lb 2 oz)   01/28/22 75.8 kg (167 lb)   01/26/22 75.3 kg (166 lb)   07/10/18 77.5 kg (170 lb 12.8 oz)   04/04/17 80.3 kg (177 lb)   03/22/16 78 kg (172 lb)   09/23/15 75.8 kg (167 lb)   07/28/15 75.8 kg (167 lb)   05/23/14 76.7 kg (169 lb)   12/06/13 76.2 kg (168 lb)       Dosing Weight: 75 kg (based on admit wt of 74.9 kg)    ASSESSED NUTRITION NEEDS  Estimated Energy Needs:0913-1830 kcals/day (25 - 30 kcals/kg)  Justification: Maintenance  Estimated Protein Needs:  grams protein/day (1.2 - 1.5 grams of pro/kg)  Justification: Increased needs and Post-op  Estimated Fluid Needs: (1 mL/kcal)   Justification: Maintenance    PHYSICAL FINDINGS  See malnutrition section below.  Poor skin turgor     MALNUTRITION  % Intake: No decreased intake noted  % Weight Loss: Weight loss does not meet criteria  Subcutaneous Fat Loss: Facial region: Mild  Muscle Loss: Temporal: Mild and Thoracic region (clavicle, acromium bone, deltoid, trapezius, pectoral): Moderate  Fluid Accumulation/Edema: Unable to assess   Malnutrition Diagnosis: Moderate malnutrition " in the context of acute illness    NUTRITION DIAGNOSIS  Inadequate protein-energy intake related to postop surgery potentially hindering po tolerance/adequacy of intakes as evidenced by CL diet order and consult received for TF therapy.    INTERVENTIONS  Implementation  Nutrition Education: Provided education on plan for TF therapy  Enteral Nutrition - Modify rate  Feeding tube flush  Multivitamin/mineral supplement therapy     Goals  Total avg nutritional intake to meet a minimum of 25 kcal/kg and 1.2 g PRO/kg daily (per dosing wt 75 kg).     Monitoring/Evaluation  Progress toward goals will be monitored and evaluated per protocol.    Jailene Jung RD,LD  6A pager [phone]

## 2022-02-16 NOTE — PROGRESS NOTES
"   02/16/22 0800   Quick Adds   Type of Visit Initial Occupational Therapy Evaluation       Present no   Living Environment   People in Home spouse   Current Living Arrangements house   Home Accessibility stairs within home   Number of Stairs, Within Home, Primary eight   Stair Railings, Within Home, Primary railing on right side (ascending)   Transportation Anticipated car, drives self;family or friend will provide   Living Environment Comments Pt lives in two level home with stairs to basement. Pt's bedroom and bathroom on main level. Bathroom is tub/shower combo   Self-Care   Usual Activity Tolerance good   Current Activity Tolerance good   Regular Exercise Yes   Activity/Exercise Type running/jogging;strength training   Exercise Amount/Frequency 3-5 times/wk   Equipment Currently Used at Home none   Fall history within last six months no   Activity/Exercise/Self-Care Comment Pt is I in self-care at baseline   Instrumental Activities of Daily Living (IADL)   Previous Responsibilities meal prep;medication management;finances;driving;work   IADL Comments Pt I in IADLs at baseline   General Information   Onset of Illness/Injury or Date of Surgery 02/15/22   Referring Physician Bear Aparicio MD   Patient/Family Therapy Goal Statement (OT) To return home   Additional Occupational Profile Info/Pertinent History of Current Problem Per otolanryngology \"Flaco Willis is a 66 year old man with a 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 He developed a left neck mass which was evaluated by his PCP and then referred to his local ENT. He had a CT neck performed on 11/16/2021 which showed a 2.5 x 2.0 cm neck mass. He had an U/S of the neck performed on 12/8/2021 which showed a 3.1 x 2.3 x 1.7 cm left neck mass.  He had an FNA of the neck mass on 12/8/2021 which showed p16+ SCC. He had a PET scan on 1/4/2022 which showed focal uptake in the left tonsil with no obvious CT correlate, 2 cm FDG avid left level IIA " "node, no distant disease. He saw medical oncology at [facility] and was recommended for radiation +/- chemotherapy, recommended against surgery due to the need for postoperative chemoradiation. He then transferred his care to the [facility]. He saw Dr Friedman on 1/19/2022 who discussed various treatment options with him. \"   Existing Precautions/Restrictions no known precautions/restrictions   Left Upper Extremity (Weight-bearing Status) full weight-bearing (FWB)   Right Upper Extremity (Weight-bearing Status) full weight-bearing (FWB)   Left Lower Extremity (Weight-bearing Status) full weight-bearing (FWB)   Right Lower Extremity (Weight-bearing Status) full weight-bearing (FWB)   General Observations and Info Activity: Up with assist   Cognitive Status Examination   Orientation Status orientation to person, place and time   Cognitive Status Comments Pt is alert, oriented, and appropriate in conversation   Visual Perception   Visual Impairment/Limitations corrective lenses for distance   Impact of Vision Impairment on Function (Vision) Pt uses glasses for driving at night to see distance   Sensory   Sensory Quick Adds No deficits were identified   Pain Assessment   Patient Currently in Pain No   Integumentary/Edema   Integumentary/Edema no deficits were identifed   Range of Motion Comprehensive   General Range of Motion no range of motion deficits identified   Strength Comprehensive (MMT)   General Manual Muscle Testing (MMT) Assessment no strength deficits identified   Muscle Tone Assessment   Muscle Tone Quick Adds No deficits were identified   Coordination   Upper Extremity Coordination No deficits were identified   Coordination Comments BUE ROM WNL   Bed Mobility   Bed Mobility supine-sit;sit-supine   Supine-Sit [place] (Bed Mobility) supervision   Sit-Supine [place] (Bed Mobility) supervision   Transfers   Transfers sit-stand transfer;toilet transfer   Sit-Stand Transfer   Sit-Stand [place] " (Transfers) supervision   Toilet Transfer   Type (Toilet Transfer) sit-stand;stand-sit   [place] Level (Toilet Transfer) supervision   Bathing Assessment/Intervention   [place] Level (Bathing) independent   Upper Body Dressing Assessment/Training   [place] Level (Upper Body Dressing) modified independence   Lower Body Dressing Assessment/Training   [place] Level (Lower Body Dressing) independent   Grooming Assessment/Training   [place] Level (Grooming) independent   Eating/Self Feeding   [place] Level (Feeding) independent   Toileting   [place] Level (Toileting) independent   Clinical Impression   Criteria for Skilled Therapeutic Interventions Met (OT) Yes, treatment indicated   OT Diagnosis Decreased activity tolerance and independence in ADLs   OT Problem List-Impairments impacting ADL problems related to;activity tolerance impaired;strength;range of motion (ROM)   Assessment of Occupational Performance 1-3 Performance Deficits   Identified Performance Deficits UB dressing, g/h, functional mobility   Planned Therapy Interventions (OT) ADL retraining;IADL retraining;strengthening;ROM;home program guidelines;progressive activity/exercise   Clinical Decision Making Complexity (OT) low complexity   Anticipated Equipment Needs Upon Discharge (OT) other (see comments)  (TBD)   Risk & Benefits of therapy have been explained evaluation/treatment results reviewed;care plan/treatment goals reviewed;risks/benefits reviewed;current/potential barriers reviewed;participants voiced agreement with care plan;participants included;patient   OT Discharge Planning   OT Discharge Recommendation (DC Rec) home with assist;home   OT Rationale for DC Rec Pt is below baseline and would benefit from skilled therapy to increase activity tolerance for ADLs/IADLs   OT Brief overview of current status SBA   Total Evaluation Time (Minutes)   Total Evaluation Time (Minutes) 10   OT Goals   Therapy Frequency (OT)  Daily   OT Predicated Duration/Target Date for Goal Attainment 02/23/22   OT Goals Hygiene/Grooming;Upper Body Dressing;Lower Body Dressing;Home Management   OT: Hygiene/Grooming independent   OT: Upper Body Dressing Modified independent   OT: Lower Body Dressing Modified independent   OT: Home Management Modified independent

## 2022-02-16 NOTE — PROGRESS NOTES
Care Management Follow Up    Length of Stay (days): 1    Expected Discharge Date: 02/18/2022     Concerns to be Addressed:   Home infusion with enteral feeds    Patient plan of care discussed at interdisciplinary rounds: Yes    Anticipated Discharge Disposition:  Home      Anticipated Discharge Services:  Home infusion   Anticipated Discharge DME:  PTA    Additional Information:  It was anticipated that Jones will need tube feed therapy at discharge. This writer sent a referral to [facility] for benefit check.        Cierra Turcios, RN  Float RN Care Coordinator  Pager [phone] (unit RNCC pager)     For Weekend & Holiday on call RN Care Coordinator:  (Home discharge with needs including home care, Assisted living facility returns, Durable medical equip, IV antibiotics)   [place]    Pager [phone] or dial * * [phone] and enter job code [phone] at prompt  [facility] [place] Only) Pager [phone]    For weekend social work needs, contact information below:  (Transitional care unit, Long-term care unit, Hospice, Counseling, Domestic violence,Vulnerable adult, Health Care Directive)  4A, 4C, 4E, 5A, 5B  Pager [phone]  6A, 6B, 6C, 6D   Pager [phone]  7A, 7B, 7C, 7D, 5C  Pager [phone]  [facility] (Saturday Only) Pager [phone]    After hours for all units- (only  is available -2969-3600/everyday)  Pager [phone]    Addendum:   Benefit check from [facility]~   Patient Flaco Willis III does not meet Medicare criteria for enteral tf, cost for Osmolite 1.5 7 cans daily via bolus is $29.79 per day for formula and supplies, if a pump is involved there is a monthly charge of $75.63. Nursing is covered if he is homebound if not [facility] charges $90.00 per visit.

## 2022-02-16 NOTE — PROGRESS NOTES
"OTOLARYNGOLOGY PROGRESS NOTE    S: No acute events overnight. Pain is well-controlled. Tolerated clear liquids overnight. TF started early this morning, no nausea or fullness. IVF stopped overnight because he was taking good amount of clears.    O:  /76 (BP Location: Right arm)   Pulse 97   Temp (!) 96.1  F (35.6  C) (Oral)   Resp 12   Ht 1.727 m (5' 8\")   Wt 74.9 kg (165 lb 2 oz)   SpO2 94%   BMI 25.11 kg/m      GEN: Sleeping in bed, easily awoken  RESP: Nonlabored breathing on room air  MOUTH: MMM. Left tonsillectomy wound with expected fibrinous debris, a few specks of blood, no active bleeding.   NECK: Incision is c/d/i. No swelling or erythema. Drain output is serosanguinous    A/P: 65yo male s/p left radical tonsillectomy and left neck dissection 2-4 on 2/15.    NEURO:   -Pain: Tylenol, oxycodone, dilaudid  -PTA escitalopram  -PTA buspirone  -PTA lorazepam 0.5-1mg q8h PRN    HEENT:  -Peridex  -SLP consult for PO intake, max of mechanical soft  -JAYNA drain care  -Incision care    PULM: GEORGE    CV:   -Hold PTA ASA 81mg  -PTA lipitor    FEN/GI:  -Requires TF for all nutrition given limited PO intake due to post-operative pain   -Nutrition consult   -Advance TF, then switch to bolus  -SLP for PO evaluation, ok for max of mechanical soft  -Bowel regimen    :  -PTA finasteride    HEME/ID: GEORGE    PPX:  -DVT: SCDs, hold on lovenox given risk of bleeding  -GI: Not indicated    Patient discussed with Dr. Jacquelyn Rodriguez MD  Otolaryngology PGY5  "

## 2022-02-16 NOTE — PLAN OF CARE
Arrived from: PACU at 1920   Belongings/meds: meds sent to security, belongings remain with patient  2 RN Skin Assessment Completed by: Caroline JON and Cheryl ADAN  Non-intact findings documented (yes/no/NA): L neck incision, sutured, SAMARA. Bacitracin applied per orders. JPx1 to L neck. Scab on R shin.    Status: s/p left radial tonsillectomy and left radical neck dissection with NG placement this morning (2/15)  Vitals: hypertensive, on 2L via NC. Capno and continuous pulse ox in place.  IV: PIV infusing LR at 75ml/hr.  Resp/trach: LS clear, denies SOB  Diet: Clear liquid diet, tolerating ice water. Plan for TF.   Bowel status: BS this morning per pt  : voiding spontaneously, PVR 30ml at 2030  Skin: see above  Pain: incisional pain, on scheduled tylenol and prn oxycodone given with relief  Activity: SBA GB  Social: independently updating family  Plan: initiate TF, pain management, control anxiety-prn ativan given with little relief.

## 2022-02-16 NOTE — PROGRESS NOTES
Therapy: Enteral tf  Insurance: [facility] Medicare/self pay     Patient Flaco Willis III does not meet Medicare criteria for enteral tf, cost for Osmolite 1.5 7 cans daily via bolus is $29.79 per day for formula and supplies, if a pump is involved there is a monthly charge of $75.63. Nursing is covered if he is homebound if not [facility] charges $90.00 per visit.      Please contact Intake with any questions, [phone] or In Basket pool,  Home Infusion ([phone]).

## 2022-02-16 NOTE — PHARMACY-ADMISSION MEDICATION HISTORY
Admission Medication History Completed by Pharmacy    See [facility] Admission Navigator for allergy information, preferred outpatient pharmacy, prior to admission medications and immunization status.     Medication History Sources:     Dispense History    Patient Jones Willis    Changes made to PTA medication list (reason):    Added: None    Deleted: Duplicate finasteride and tadalafil order    Changed: None    Additional Information:    None    Prior to Admission medications    Medication Sig Last Dose Taking? Auth Provider   aspirin 81 MG tablet Take 81 mg by mouth 2/10/2022 at Unknown time Yes Reported, Patient   atorvastatin (LIPITOR) 20 MG tablet Take 20 mg by mouth daily 2/14/2022 at 2100 Yes Reported, Patient   busPIRone (BUSPAR) 10 MG tablet Take 10 mg by mouth 2 times daily  Past Week at Unknown time Yes Reported, Patient   Cholecalciferol (VITAMIN D3) 1000 UNITS CAPS Take 1,000 Units by mouth  2/14/2022 at 0800 Yes Reported, Patient   dextroamphetamine (DEXEDRINE SPANSULE) 15 MG 24 hr capsule Take 15 mg by mouth  More than a month at Unknown time Yes Reported, Patient   escitalopram (LEXAPRO) 10 MG tablet Take 10 mg by mouth daily Past Week at Unknown time Yes Reported, Patient   finasteride (PROPECIA) 1 MG tablet Take 1 mg by mouth daily  2/14/2022 at 2100 Yes Reported, Patient   IBUPROFEN PO Take 400 mg by mouth as needed for moderate pain  Past Month at Unknown time Yes Reported, Patient   LORazepam (ATIVAN) 0.5 MG tablet Take 0.5-1 mg by mouth  2/14/2022 at Unknown time Yes Reported, Patient   Mesalamine (PENTASA PO)  2/14/2022 at 0800 Yes Reported, Patient   Omega-3 Fatty Acids (FISH OIL PO)  Past Week at 0800 Yes Reported, Patient   tadalafil (CIALIS) 20 MG tablet Take 20 mg by mouth Past Month at Unknown time Yes Reported, Patient   VITAMIN E MTC PO  2/14/2022 at 0800 Yes Reported, Patient   zolpidem (AMBIEN) 10 MG tablet Take 10 mg by mouth nightly as needed  2/14/2022 at 2100 Yes Reported, Patient        Date completed: 02/15/22    Medication history completed by: Vicente Rivera RP

## 2022-02-16 NOTE — CONSULTS
Care Management Initial Consult    General Information  Assessment completed with: Patient,    Type of CM/SW Visit: Initial Assessment    Primary Care Provider verified and updated as needed: Yes   Readmission within the last 30 days: no previous admission in last 30 days      Reason for Consult: discharge planning  Advance Care Planning: Advance Care Planning Reviewed: no concerns identified          Communication Assessment  Patient's communication style: spoken language (English or Bilingual)    Hearing Difficulty or Deaf: no   Wear Glasses or Blind: yes    Cognitive  Cognitive/Neuro/Behavioral: WDL  Level of Consciousness: alert  Arousal Level: opens eyes spontaneously  Orientation: oriented x 4  Mood/Behavior: calm, cooperative          Living Environment:   People in home: spouse     Current living Arrangements: house      Able to return to prior arrangements: yes       Family/Social Support:  Care provided by: self  Provides care for: no one  Marital Status:   Wife, Children  Clementine       Description of Support System: Supportive, Involved    Support Assessment: Adequate family and caregiver support, Adequate social supports    Current Resources:   Patient receiving home care services:       Community Resources:    Equipment currently used at home: none  Supplies currently used at home:      Employment/Financial:  Employment Status:          Financial Concerns: insurance, none, No concerns identified           Lifestyle & Psychosocial Needs:  Social Determinants of Health     Tobacco Use: Medium Risk     Smoking Tobacco Use: Former Smoker     Smokeless Tobacco Use: Never Used   Alcohol Use: Not on file   Financial Resource Strain: Not on file   Food Insecurity: Not on file   Transportation Needs: Not on file   Physical Activity: Not on file   Stress: Not on file   Social Connections: Not on file   Intimate Partner Violence: Not on file   Depression: At risk     PHQ-2 Score: 3   Housing Stability: Not on  file       Functional Status:  Prior to admission patient needed assistance:   Dependent ADLs:: Independent  Dependent IADLs:: Independent       Mental Health Status:  Mental Health Status: No Current Concerns       Chemical Dependency Status:                Values/Beliefs:  Spiritual, Cultural Beliefs, Scientologist Practices, Values that affect care: no               Additional Information:  This writer met and spoke with patient regarding discharge planning. Jones is aware that he will discharge with enteral feeds via the nasogastric tube. We discussed the process of referral to [facility]. Jones is in agreement to this. Jones also states he has support at home with his spouse- Clementine. He anticipates going home in 1-2 days. There were no other needs that is anticipated.     RNCC will continue to follow.     Cierra Turcios RN  Float RN Care Coordinator  Pager [phone] (unit RNCC pager)     For Weekend & Holiday on call RN Care Coordinator:  (Home discharge with needs including home care, Assisted living facility returns, Durable medical equip, IV antibiotics)   [place]    Pager [phone] or dial * * [phone] and enter job code [phone] at prompt  [facility] (Sunday Only) Pager [phone]    For weekend social work needs, contact information below:  (Transitional care unit, Long-term care unit, Hospice, Counseling, Domestic violence,Vulnerable adult, Health Care Directive)  4A, 4C, 4E, 5A, 5B  Pager [phone]  6A, 6B, 6C, 6D   Pager [phone]  7A, 7B, 7C, 7D, 5C  Pager [phone]  [facility] (Saturday Only) Pager [phone]    After hours for all units- (only  is available -5578-5916/everyday)  Pager [phone]

## 2022-02-17 ENCOUNTER — APPOINTMENT (OUTPATIENT)
Dept: OCCUPATIONAL THERAPY | Facility: CLINIC | Age: 67
DRG: 141 | End: 2022-02-17
Attending: OTOLARYNGOLOGY
Payer: COMMERCIAL

## 2022-02-17 LAB
GLUCOSE BLDC GLUCOMTR-MCNC: 139 MG/DL (ref 70–99)
HOLD SPECIMEN: NORMAL
MAGNESIUM SERPL-MCNC: 1.9 MG/DL (ref 1.8–2.6)
PHOSPHATE SERPL-MCNC: 2.4 MG/DL (ref 2.5–4.5)
POTASSIUM BLD-SCNC: 3.8 MMOL/L (ref 3.4–5.3)

## 2022-02-17 PROCEDURE — 36415 COLL VENOUS BLD VENIPUNCTURE: CPT | Performed by: OTOLARYNGOLOGY

## 2022-02-17 PROCEDURE — 999N000147 HC STATISTIC PT IP EVAL DEFER: Performed by: PHYSICAL THERAPIST

## 2022-02-17 PROCEDURE — 250N000013 HC RX MED GY IP 250 OP 250 PS 637: Performed by: OTOLARYNGOLOGY

## 2022-02-17 PROCEDURE — 83735 ASSAY OF MAGNESIUM: CPT | Performed by: OTOLARYNGOLOGY

## 2022-02-17 PROCEDURE — 250N000013 HC RX MED GY IP 250 OP 250 PS 637: Performed by: STUDENT IN AN ORGANIZED HEALTH CARE EDUCATION/TRAINING PROGRAM

## 2022-02-17 PROCEDURE — 97535 SELF CARE MNGMENT TRAINING: CPT | Mod: GO | Performed by: OCCUPATIONAL THERAPIST

## 2022-02-17 PROCEDURE — 84132 ASSAY OF SERUM POTASSIUM: CPT | Performed by: OTOLARYNGOLOGY

## 2022-02-17 PROCEDURE — 84100 ASSAY OF PHOSPHORUS: CPT | Performed by: OTOLARYNGOLOGY

## 2022-02-17 PROCEDURE — 120N000002 HC R&B MED SURG/OB UMMC

## 2022-02-17 PROCEDURE — 250N000013 HC RX MED GY IP 250 OP 250 PS 637

## 2022-02-17 RX ORDER — AMOXICILLIN 250 MG
1 CAPSULE ORAL 2 TIMES DAILY
Qty: 28 TABLET | Refills: 0 | Status: SHIPPED | OUTPATIENT
Start: 2022-02-17 | End: 2023-08-23

## 2022-02-17 RX ORDER — OXYCODONE HYDROCHLORIDE 5 MG/1
5 TABLET ORAL EVERY 4 HOURS PRN
Status: DISCONTINUED | OUTPATIENT
Start: 2022-02-17 | End: 2022-02-18

## 2022-02-17 RX ORDER — ACETAMINOPHEN 325 MG/1
650 TABLET ORAL EVERY 6 HOURS PRN
Qty: 30 TABLET | Refills: 0 | Status: SHIPPED | OUTPATIENT
Start: 2022-02-17 | End: 2023-08-23

## 2022-02-17 RX ORDER — MINERAL OIL/HYDROPHIL PETROLAT
OINTMENT (GRAM) TOPICAL
Qty: 99 G | Refills: 0 | Status: SHIPPED | OUTPATIENT
Start: 2022-02-17 | End: 2023-08-23

## 2022-02-17 RX ORDER — CHLORHEXIDINE GLUCONATE ORAL RINSE 1.2 MG/ML
SOLUTION DENTAL
Qty: 473 ML | Refills: 0 | Status: SHIPPED | OUTPATIENT
Start: 2022-02-17 | End: 2023-08-23

## 2022-02-17 RX ORDER — OXYCODONE HYDROCHLORIDE 5 MG/1
5 TABLET ORAL EVERY 6 HOURS PRN
Status: DISCONTINUED | OUTPATIENT
Start: 2022-02-17 | End: 2022-02-17

## 2022-02-17 RX ADMIN — Medication 15 ML: at 07:57

## 2022-02-17 RX ADMIN — Medication 1 MG: at 02:27

## 2022-02-17 RX ADMIN — POTASSIUM & SODIUM PHOSPHATES POWDER PACK 280-160-250 MG 1 PACKET: 280-160-250 PACK at 16:03

## 2022-02-17 RX ADMIN — ESCITALOPRAM OXALATE 10 MG: 10 TABLET ORAL at 07:58

## 2022-02-17 RX ADMIN — ATORVASTATIN CALCIUM 20 MG: 20 TABLET, FILM COATED ORAL at 19:01

## 2022-02-17 RX ADMIN — CHLORHEXIDINE GLUCONATE 0.12% ORAL RINSE 10 ML: 1.2 LIQUID ORAL at 16:03

## 2022-02-17 RX ADMIN — POTASSIUM & SODIUM PHOSPHATES POWDER PACK 280-160-250 MG 1 PACKET: 280-160-250 PACK at 19:01

## 2022-02-17 RX ADMIN — OXYCODONE HYDROCHLORIDE 5 MG: 5 TABLET ORAL at 13:25

## 2022-02-17 RX ADMIN — OXYCODONE HYDROCHLORIDE 5 MG: 5 TABLET ORAL at 17:33

## 2022-02-17 RX ADMIN — OXYCODONE HYDROCHLORIDE 5 MG: 5 TABLET ORAL at 23:42

## 2022-02-17 RX ADMIN — WHITE PETROLATUM: 1.75 OINTMENT TOPICAL at 19:01

## 2022-02-17 RX ADMIN — Medication 1 MG: at 10:22

## 2022-02-17 RX ADMIN — ACETAMINOPHEN 975 MG: 325 TABLET, FILM COATED ORAL at 05:24

## 2022-02-17 RX ADMIN — CHLORHEXIDINE GLUCONATE 0.12% ORAL RINSE 10 ML: 1.2 LIQUID ORAL at 07:57

## 2022-02-17 RX ADMIN — POTASSIUM & SODIUM PHOSPHATES POWDER PACK 280-160-250 MG 1 PACKET: 280-160-250 PACK at 12:45

## 2022-02-17 RX ADMIN — OXYCODONE HYDROCHLORIDE 5 MG: 5 TABLET ORAL at 09:25

## 2022-02-17 RX ADMIN — ZOLPIDEM TARTRATE 5 MG: 5 TABLET ORAL at 20:11

## 2022-02-17 RX ADMIN — PHENOL 2 ML: 1.5 LIQUID ORAL at 23:42

## 2022-02-17 RX ADMIN — WHITE PETROLATUM: 1.75 OINTMENT TOPICAL at 02:27

## 2022-02-17 RX ADMIN — WHITE PETROLATUM: 1.75 OINTMENT TOPICAL at 10:23

## 2022-02-17 RX ADMIN — OXYCODONE HYDROCHLORIDE 10 MG: 10 TABLET ORAL at 03:22

## 2022-02-17 RX ADMIN — Medication 1 MG: at 19:01

## 2022-02-17 RX ADMIN — CHLORHEXIDINE GLUCONATE 0.12% ORAL RINSE 10 ML: 1.2 LIQUID ORAL at 12:45

## 2022-02-17 RX ADMIN — ACETAMINOPHEN 975 MG: 325 TABLET, FILM COATED ORAL at 20:11

## 2022-02-17 RX ADMIN — ACETAMINOPHEN 975 MG: 325 TABLET, FILM COATED ORAL at 12:44

## 2022-02-17 RX ADMIN — CHLORHEXIDINE GLUCONATE 0.12% ORAL RINSE 10 ML: 1.2 LIQUID ORAL at 19:01

## 2022-02-17 RX ADMIN — ACETAMINOPHEN 650 MG: 325 TABLET, FILM COATED ORAL at 23:42

## 2022-02-17 ASSESSMENT — ACTIVITIES OF DAILY LIVING (ADL)
ADLS_ACUITY_SCORE: 4

## 2022-02-17 NOTE — PLAN OF CARE
PT evaluation cx and deferred.  OT completed evaluation, patient demonstrated independent ambulation in halls with no additional acute PT intervention indicated to address gait/balance. OT will continue to follow and address ambulation  from endurance standpoint. Will complete PT order.

## 2022-02-17 NOTE — PLAN OF CARE
Status: Pt s/p tonsillectomy, L MRND, NG tube placement 2/15  Vitals: HTN w/in brad, stable on RA  Neuros: Slight numbness around incision site, otherwise intact  IV: PIVs are SL  Labs/Electrolytes: WDL  Resp/trach: Lung sounds are clear  Diet: Mech dental soft, TF advanced to 40 mL/hr @ 1830  Bowel status: Bowel sounds are active, no BM  : Voiding spontaneously  Skin: Neck incision is WDL and SAMARA, x1 JAYNA to the neck  Pain: Complained of pain throughout the shift, found adequate relief w/ prn Ocy  Activity: SBA for line management  Social: Pt was pleasant and calm throughout the shift, wife Clementine visited, both received some TF edu  Plan: Will continue to monitor and reinforce education, possible discharge tomorrow  Updates this shift: Completed some TF teaching with the patient and his wife

## 2022-02-17 NOTE — PROGRESS NOTES
"OTOLARYNGOLOGY PROGRESS NOTE    S: No acute events overnight. Pain has been well controlled. Taking oxycodone, ativan and ambien. Required NC overnight due to intermittent desats. Tolerating advancement of PO intake, took ice cream and eggs today. Tolerating advancement to bolus TF. Working with nursing on JAYNA and TF cares, needs a little more practive    O:  BP (!) 140/76 (BP Location: Right arm)   Pulse 94   Temp 99.2  F (37.3  C) (Oral)   Resp 16   Ht 1.727 m (5' 8\")   Wt 74.9 kg (165 lb 2 oz)   SpO2 94%   BMI 25.11 kg/m      GEN: Sleeping in bed, easily awoken  RESP: Nonlabored breathing on room air  MOUTH: MMM. Left tonsillectomy wound with expected fibrinous debris, a few specks of blood, no active bleeding.   NECK: Incision is c/d/i. No swelling or erythema. Drain output is serosanguinous    A/P: 67yo male s/p left radical tonsillectomy and left neck dissection 2-4 on 2/15.    NEURO:   -Pain:    -Tylenol   -Decrease oxycodone   -Discontinue dilaudid  -PTA escitalopram  -PTA buspirone  -PTA lorazepam 0.5-1mg q8h PRN    HEENT:  -Peridex  -SLP consult for PO intake, max of mechanical soft  -JAYNA drain care  -Incision care    PULM: Required O2 by NC overnight likely due to taking oxycodone, lorazepam, and zolpidem.  -Decrease sedating medications    CV:   -Hold PTA ASA 81mg  -PTA lipitor    FEN/GI: Moderate Protein-Calorie Malnutrition   -Requires TF for all nutrition given limited PO intake due to post-operative pain   -Nutrition consult   -Advance to bolus TF  -SLP for PO evaluation, ok for max of mechanical soft  -Bowel regimen    :  -PTA finasteride    HEME/ID: GEORGE    PPX:  -DVT: SCDs, hold on lovenox given risk of bleeding  -GI: Not indicated    DISPO: Likely tomorrow    Patient discussed with Dr. Jacquelyn Rodriguez MD  Otolaryngology PGY5  "

## 2022-02-17 NOTE — PROGRESS NOTES
"Care Management Follow Up    Length of Stay (days): 2    Expected Discharge Date: 02/18/2022     Concerns to be Addressed:  Discharge planning     Patient plan of care discussed at interdisciplinary rounds: Yes    Anticipated Discharge Disposition:  Home with wife     Anticipated Discharge Services:    Anticipated Discharge DME:  Tube feeding supplies    Patient/family educated on Medicare website which has current facility and service quality ratings:  Yes  Education Provided on the Discharge Plan:  Yes  Patient/Family in Agreement with the Plan:  Yes    Referrals Placed by CM/SW:  [facility] for tube feeding supplies. Cancelled referral to  [facility].   Private pay costs discussed: tube feeding supplies    Additional Information:  In 6A Discharge Rounds nursing reported tube feeding was stopped this AM in preparation for changing to gravity feedings. Needs tube feeding teaching. Has a JAYNA drain. On a mechanical soft diet.  Informed by  [facility]:  \"Patient Flaco Willis III does not meet Medicare criteria for enteral tf, cost for Osmolite 1.5 7 cans daily via bolus is $29.79 per day for formula and supplies, if a pump is involved there is a monthly charge of $75.63. Nursing is covered if he is homebound if not [facility] charges $90.00 per visit.\"  Informed by Kaela Alan RN Liaison with  [facility] she met with him and informed him tube feeding will be private pay. Pt OK with any DME agency for supplies.   Faxed script for tube feeding supplies: Osmolite 1,5, gravity bags, syringes; to [facility].     This afternoon informed by nursing pt & his wife are anxious. Met with pt & wife. Nursing just completing teaching wife JAYNA drain cares. Wife attentive and writing down information.   Explained Medicare criteria for tube feeding coverage: has to be the only source of nutrition for greater than 90 days. Pt does not anticipate having the feeding tube that long. He has an ENT Clinic appt on Wed, " 2-23; anticipate they will assess if tube can be removed at that time. Pt said he is not able to take much orally yet; having throat pain.   Informed pt & wife I faxed the tube feeding script to [facility]. Their cost is less expensive. Discussed supplies needed. Pt & wife in agreement with plan.   Wife feeling better about cares and less anxious after discussion and having teaching.   This afternoon I updated Dr Rodriguez.     5:20pm:  Received fax from [facility] with tube feeding supply quote. They will need pt's credit card information and then can deliver supplies tomorrow afternoon. I left a voice message for pt's wife, Clementine, that I received the info from [place]; will leave it in pt's room.   I typed out the following information and gave it to pt, along with the [facility] quote form.     02-  Dezkade Lazaro    Tube feeding supplies from [facility] (see form for quote):   ---On Friday, 02-18, call [facility] at [phone], press Option 4 for Customer Service. Aileen is the .   ---Review the supplies & price quote with [facility]. If you don't want as much of a certain item let them know.   ---They will ask for a credit card number.  ---They can deliver the supplies to your home tomorrow afternoon, 02-18.     The Discharge Nurse will send you home with a few cartons of tube feeding, a gravity bag and syringes.     Thanks,  TARAN Aguero Care Coordinator  ______________________________________________________________________________________________________________________    Spoke with pt's nurse, Caroline. She reported pt has the Enfit feeding tube. Will need Enfit syringes for discharge.         More Christina RN Care Coordinator  [address], [facility]  Phone:  [phone]  Fax:  [phone]

## 2022-02-17 NOTE — PLAN OF CARE
Status: Pt s/p L tonsillectomy, L MRND, NG tube placement 2/15  Vitals: Hypertensive within parameters, 1 L NC needed overnight   Neuros: Intact ex slightly anxious at times (ativan prn available). Numbness at incision site   IV: PIV SL  Resp/trach: WNL  Diet: Mechanical/dental soft. TF @ goal of 60 ml/hr. TF stopped @ 0530. To start bolus feeds @ 0800 per patient care order  Bowel status: No BM since surgery  : Voiding  Skin: L neck incision with sutures, JAYNA x1 to neck  Pain: Oxycodone and tylenol effective  Activity: SBA  Plan: TF PLC to be done, JAYNA handout given and educated patient on drain care. Continue with POC

## 2022-02-17 NOTE — PLAN OF CARE
Vitals: VSS on RA   Neuros: Intact ex slightly anxious at times (ativan prn available). Numbness at incision site   IV: PIV SL  Resp/trach: WNL on RA  Diet: Mechanical/dental soft. Bolus feeds started today. Received 1/2 can at 0800 and 1 can at 1300. Tolerating well. Denied nausea.  Bowel status: No BM since surgery, declined bowel meds. Education provided.  : Voiding  Skin: L neck incision with sutures, JAYNA x1 to neck.  Pain: Oxycodone and tylenol given for pain to left neck/throat. Phenol spray requested from pharmacy.   Activity: Independent  Plan: TF PLC unable to be scheduled until Sunday. NG med administration and bolus TF education completed with wife and patient today. Wife able to return demo cares but would benefit from more practice. JAYNA care education completed with wife and patient today. Wife able to return demo cares. Please continue to encourage patient and wife to complete TF and JAYNA cares.

## 2022-02-18 ENCOUNTER — APPOINTMENT (OUTPATIENT)
Dept: OCCUPATIONAL THERAPY | Facility: CLINIC | Age: 67
DRG: 141 | End: 2022-02-18
Attending: OTOLARYNGOLOGY
Payer: COMMERCIAL

## 2022-02-18 LAB
GLUCOSE BLDC GLUCOMTR-MCNC: 102 MG/DL (ref 70–99)
MAGNESIUM SERPL-MCNC: 2 MG/DL (ref 1.8–2.6)
PLATELET # BLD AUTO: 197 10E3/UL (ref 150–450)
POTASSIUM BLD-SCNC: 3.9 MMOL/L (ref 3.4–5.3)

## 2022-02-18 PROCEDURE — 120N000002 HC R&B MED SURG/OB UMMC

## 2022-02-18 PROCEDURE — 97535 SELF CARE MNGMENT TRAINING: CPT | Mod: GO | Performed by: OCCUPATIONAL THERAPIST

## 2022-02-18 PROCEDURE — 97110 THERAPEUTIC EXERCISES: CPT | Mod: GO | Performed by: OCCUPATIONAL THERAPIST

## 2022-02-18 PROCEDURE — 85049 AUTOMATED PLATELET COUNT: CPT | Performed by: STUDENT IN AN ORGANIZED HEALTH CARE EDUCATION/TRAINING PROGRAM

## 2022-02-18 PROCEDURE — 250N000013 HC RX MED GY IP 250 OP 250 PS 637

## 2022-02-18 PROCEDURE — 250N000013 HC RX MED GY IP 250 OP 250 PS 637: Performed by: STUDENT IN AN ORGANIZED HEALTH CARE EDUCATION/TRAINING PROGRAM

## 2022-02-18 PROCEDURE — 97530 THERAPEUTIC ACTIVITIES: CPT | Mod: GO | Performed by: OCCUPATIONAL THERAPIST

## 2022-02-18 PROCEDURE — 36415 COLL VENOUS BLD VENIPUNCTURE: CPT | Performed by: OTOLARYNGOLOGY

## 2022-02-18 PROCEDURE — 84132 ASSAY OF SERUM POTASSIUM: CPT | Performed by: OTOLARYNGOLOGY

## 2022-02-18 PROCEDURE — 83735 ASSAY OF MAGNESIUM: CPT | Performed by: OTOLARYNGOLOGY

## 2022-02-18 RX ORDER — LANOLIN ALCOHOL/MO/W.PET/CERES
3 CREAM (GRAM) TOPICAL
Qty: 14 TABLET | Refills: 1 | Status: SHIPPED | OUTPATIENT
Start: 2022-02-18

## 2022-02-18 RX ORDER — LORAZEPAM 2 MG/ML
.5-1 CONCENTRATE ORAL EVERY 8 HOURS PRN
Qty: 10.5 ML | Refills: 0 | Status: SHIPPED | OUTPATIENT
Start: 2022-02-18 | End: 2022-02-18

## 2022-02-18 RX ORDER — OXYCODONE HYDROCHLORIDE 5 MG/1
5 TABLET ORAL EVERY 6 HOURS PRN
Status: DISCONTINUED | OUTPATIENT
Start: 2022-02-18 | End: 2022-02-19 | Stop reason: HOSPADM

## 2022-02-18 RX ORDER — LORAZEPAM 2 MG/ML
.5-1 CONCENTRATE ORAL 2 TIMES DAILY PRN
Qty: 7 ML | Refills: 0 | Status: SHIPPED | OUTPATIENT
Start: 2022-02-18 | End: 2023-08-23

## 2022-02-18 RX ORDER — OXYCODONE HYDROCHLORIDE 5 MG/1
5 TABLET ORAL EVERY 6 HOURS PRN
Qty: 40 TABLET | Refills: 0 | Status: SHIPPED | OUTPATIENT
Start: 2022-02-18 | End: 2022-02-28

## 2022-02-18 RX ADMIN — CHLORHEXIDINE GLUCONATE 0.12% ORAL RINSE 10 ML: 1.2 LIQUID ORAL at 09:00

## 2022-02-18 RX ADMIN — ESCITALOPRAM OXALATE 10 MG: 10 TABLET ORAL at 09:00

## 2022-02-18 RX ADMIN — PHENOL 2 ML: 1.5 LIQUID ORAL at 06:04

## 2022-02-18 RX ADMIN — Medication 15 ML: at 08:56

## 2022-02-18 RX ADMIN — OXYCODONE HYDROCHLORIDE 5 MG: 5 TABLET ORAL at 21:53

## 2022-02-18 RX ADMIN — ACETAMINOPHEN 650 MG: 325 TABLET, FILM COATED ORAL at 14:44

## 2022-02-18 RX ADMIN — WHITE PETROLATUM: 1.75 OINTMENT TOPICAL at 20:10

## 2022-02-18 RX ADMIN — CHLORHEXIDINE GLUCONATE 0.12% ORAL RINSE 10 ML: 1.2 LIQUID ORAL at 16:31

## 2022-02-18 RX ADMIN — ACETAMINOPHEN 975 MG: 325 TABLET, FILM COATED ORAL at 06:04

## 2022-02-18 RX ADMIN — Medication 1 MG: at 03:06

## 2022-02-18 RX ADMIN — OXYCODONE HYDROCHLORIDE 5 MG: 5 TABLET ORAL at 04:01

## 2022-02-18 RX ADMIN — Medication 1 MG: at 20:10

## 2022-02-18 RX ADMIN — WHITE PETROLATUM: 1.75 OINTMENT TOPICAL at 11:53

## 2022-02-18 RX ADMIN — PHENOL 2 ML: 1.5 LIQUID ORAL at 11:53

## 2022-02-18 RX ADMIN — CHLORHEXIDINE GLUCONATE 0.12% ORAL RINSE 10 ML: 1.2 LIQUID ORAL at 11:53

## 2022-02-18 RX ADMIN — SENNOSIDES AND DOCUSATE SODIUM 1 TABLET: 50; 8.6 TABLET ORAL at 20:10

## 2022-02-18 RX ADMIN — ATORVASTATIN CALCIUM 20 MG: 20 TABLET, FILM COATED ORAL at 20:10

## 2022-02-18 RX ADMIN — POLYETHYLENE GLYCOL 3350 17 G: 17 POWDER, FOR SOLUTION ORAL at 08:56

## 2022-02-18 RX ADMIN — Medication 1 MG: at 11:51

## 2022-02-18 RX ADMIN — ACETAMINOPHEN 650 MG: 325 TABLET, FILM COATED ORAL at 20:10

## 2022-02-18 RX ADMIN — CHLORHEXIDINE GLUCONATE 0.12% ORAL RINSE 10 ML: 1.2 LIQUID ORAL at 20:10

## 2022-02-18 RX ADMIN — SENNOSIDES AND DOCUSATE SODIUM 1 TABLET: 50; 8.6 TABLET ORAL at 09:04

## 2022-02-18 RX ADMIN — WHITE PETROLATUM: 1.75 OINTMENT TOPICAL at 03:06

## 2022-02-18 ASSESSMENT — ACTIVITIES OF DAILY LIVING (ADL)
ADLS_ACUITY_SCORE: 4
ADLS_ACUITY_SCORE: 5
ADLS_ACUITY_SCORE: 4
ADLS_ACUITY_SCORE: 5
ADLS_ACUITY_SCORE: 4
ADLS_ACUITY_SCORE: 5
ADLS_ACUITY_SCORE: 4
ADLS_ACUITY_SCORE: 5
ADLS_ACUITY_SCORE: 4
ADLS_ACUITY_SCORE: 4
ADLS_ACUITY_SCORE: 5
ADLS_ACUITY_SCORE: 4
ADLS_ACUITY_SCORE: 5
ADLS_ACUITY_SCORE: 5
ADLS_ACUITY_SCORE: 4
ADLS_ACUITY_SCORE: 5
ADLS_ACUITY_SCORE: 4
ADLS_ACUITY_SCORE: 4

## 2022-02-18 NOTE — PLAN OF CARE
Status: Pt s/p L tonsillectomy, L MRND, NG tube placement 2/15  Vitals: VS include HTN (no parameters in place) and requiring 1-2L NC while sleeping for SpO2 88-89% ok for RA while awake 93-95%  Neuros: A&Ox4. Intact ex slightly anxious at times (ativan prn available). Numbness at incision site.   IV: PIV SL  Resp/trach: 1-2L while sleeping for SpO2 dipping down to 88-89%. Ok for RA while awake. Throat spray for comfort. Coughing up small amount of thick tan/red streaked secretions   Diet: Mechanical/dental soft +bolus TF started yesterday via NG with goal of 6 cans/day, received 3.5/6 cans yesterday  Bowel status: No BM since surgery, LBM 2/14 per report. Passing gas  : Voiding spontaneously in bathroom  Skin: L neck incision SAMARA with sutures, CDI. L. Neck JAYNA x1.  Pain: PRN oxycodone, tylenol, and throat spray given for neck/throat pain. Ativan given x1 for anxiety.   Activity: Independent  Plan: Anticipated discharge home today with TF supply delivery via [facility]. Continue to encourage patient to complete TF and JAYNA cares

## 2022-02-18 NOTE — PLAN OF CARE
Status: Pt s/p L tonsillectomy, L MRND, NG tube placement 2/15  Vitals: VSS on RA   Neuros: Intact ex slightly anxious at times (ativan prn available). Numbness at incision site.   IV: PIV SL  Resp/trach: WNL on RA  Diet: Mechanical/dental soft. Bolus feeds started today. Received 3.5 can total today.Tolerating well. Denied nausea.  Bowel status: No BM since surgery, declined bowel meds. Education provided.  : Voiding  Skin: L neck incision with sutures, JAYNA x1 to neck.  Pain: Oxycodone and tylenol given for pain to left neck/throat. Ativan given x1 for anxiety. Declined throat spray.   Activity: Independent  Plan: TF education given-pt able to participate in administration of TF. JAYNA care education also reinforced. Wife able to return demo cares. Continue to encourage patient to complete TF and JAYNA cares

## 2022-02-18 NOTE — PROGRESS NOTES
"OTOLARYNGOLOGY PROGRESS NOTE    S: No acute events overnight. Required 1-2L NC overnight for intermittent desats to the 80s. He reports pain has been well-controlled. He would like to try stopping ambien.    O:  BP (!) 143/96 (BP Location: Right arm)   Pulse 94   Temp 97.4  F (36.3  C) (Oral)   Resp 16   Ht 1.727 m (5' 8\")   Wt 74.9 kg (165 lb 2 oz)   SpO2 92%   BMI 25.11 kg/m      GEN: Awake in bed, alert  RESP: Nonlabored breathing on room air  MOUTH: MMM. Left tonsillectomy wound with increasing fibrinous debris as expected, no evidence of recent bleeding.   NECK: Incision is c/d/i. No swelling or erythema. Drain output is serosanguinous    JAYNA: 12/15/8 (35)    A/P: 67yo male s/p left radical tonsillectomy and left neck dissection 2-4 on 2/15.    NEURO:   -Pain:    -Tylenol   -Decrease oxycodone to 5mg q6h PRN  -Discontinue PTA ambien  -PTA escitalopram  -PTA buspirone  -PTA lorazepam 0.5-1mg q8h PRN    HEENT:  -Peridex  -SLP cleared him for mechanical soft  -JAYNA drain care  -Incision care    PULM: Required O2 by NC overnight again likely due to taking oxycodone, lorazepam, and zolpidem.  -Decrease sedating medications as above    CV:   -Hold PTA ASA 81mg  -PTA lipitor    FEN/GI: Moderate Protein-Calorie Malnutrition   -Requires TF for all nutrition given limited PO intake due to post-operative pain   -Nutrition consult   -Advance to bolus TF  -SLP cleared for max of mechanical soft  -Bowel regimen- will schedule today    :  -PTA finasteride    HEME/ID: GEORGE    PPX:  -DVT: SCDs, hold on lovenox given risk of bleeding  -GI: Not indicated    DISPO: Likely tomorrow    Patient discussed with Dr. Jacquelyn Rodriguez MD  Otolaryngology PGY5  "

## 2022-02-18 NOTE — PROGRESS NOTES
Care Management Follow Up    Length of Stay (days): 3    Expected Discharge Date:  02-     Concerns to be Addressed:  Discharge planning     Patient plan of care discussed at interdisciplinary rounds: Yes    Anticipated Discharge Disposition:  Home with wife     Anticipated Discharge Services:    Anticipated Discharge DME:  Tube feeding supplies    Patient/family educated on Medicare website which has current facility and service quality ratings:  Yes  Education Provided on the Discharge Plan:  Yes  Patient/Family in Agreement with the Plan:  Yes    Referrals Placed by CM/SW:  [facility] for tube feeding. [facility]: cancelled referral.   Private pay costs discussed: pt & wife informed tube feeding supplies will be private pay.   ________________________    Diagnosis:  Squamous cell carcinoma of oropharynx; secondary malignancy of lymph nodes  Procedure: Radical tonsillectomy; neck dissection; NG tube placement; direct laryngoscopy    Additional Information:  In 6A Discharge Rounds nursing reported discharge home is anticipated today.   At 10am spoke with pt's nurse, Ave. She reported pt required oxygen last night; the doctors anticipate pt will remain hospitalized today to wean off oxygen and discharge tomorrow.   Called [facility] and spoke with Bridget; informed her discharge is anticipated tomorrow. Informed her I gave pt their form and left a message for wife with [facility]'s phone number.     Spoke with Dr Rodriguez. Pt dropped sats last night, likely due to medications. Decreasing Oxycodone. ENT discussed medications with pt. Anticipate discharge home tomorrow.   Spoke with nursing, they will send pt home with extra tube feeding supplies. Pt has an Enfit feeding tube.       More Christina RN Care Coordinator  [address], [facility] (tube feeding)  Phone: [phone]  Fax:  [phone]

## 2022-02-18 NOTE — PLAN OF CARE
Status: L tonsillectomy, L MRND, NG tube placement 2/15  Vitals: VSS.  On RA this shift.  Sats 93% on RA.    Neuros: A&Ox4.  Intact, except anxious at times.  Ativan (PRN) given x1.  Numbness at incision.  IV: PIV SL  Labs/Electrolytes: WNL  Resp/trach: Throat spray for pain.    Diet: mechanical/dental soft diet.  TF boluses started 2/17.  Goal is to get 6 cans/day.  Received 3 cans thus far today.  Tolerating well.  Bowel status: passing gas.  BS+.  LBM 2/14  : voiding spont  Skin: L neck incision SAMARA with sutures.  CDI.  L neck JAYNA x1  Pain: has pain, per patient is minimal.  Declined pain meds. Wants to limit oxycodone, d/t decreased sats on night shift, when he required O2 via NC to as sats dipped into 88%.    Activity: independent  Social: wife at bedside  Plan: discharge home tomorrow. Did not discharge today d/t requiring O2 via NC over night when asleep.    Updates this shift: JAYNA, TF, and med administration teaching done at bedside with the patient this shift.  Patient performed med administration, and emptied JAYNA drain.    TF teaching also done with the patient's wife, and home TF supplies sent home with the wife.

## 2022-02-18 NOTE — PLAN OF CARE
Occupational Therapy Discharge Summary    Reason for therapy discharge:    Discharged to home.    Progress towards therapy goal(s). See [facility] on [facility] for goal details.  Goals met    Therapy recommendation(s):    No further therapy is recommended.    Goal Outcome Evaluation:    Plan of Care Reviewed With: patient

## 2022-02-19 ENCOUNTER — HEALTH MAINTENANCE LETTER (OUTPATIENT)
Age: 67
End: 2022-02-19

## 2022-02-19 VITALS
HEIGHT: 68 IN | DIASTOLIC BLOOD PRESSURE: 82 MMHG | TEMPERATURE: 96.6 F | OXYGEN SATURATION: 94 % | SYSTOLIC BLOOD PRESSURE: 140 MMHG | RESPIRATION RATE: 16 BRPM | WEIGHT: 165.12 LBS | HEART RATE: 105 BPM | BODY MASS INDEX: 25.03 KG/M2

## 2022-02-19 LAB
MAGNESIUM SERPL-MCNC: 2 MG/DL (ref 1.8–2.6)
POTASSIUM BLD-SCNC: 4 MMOL/L (ref 3.4–5.3)

## 2022-02-19 PROCEDURE — 250N000013 HC RX MED GY IP 250 OP 250 PS 637: Performed by: STUDENT IN AN ORGANIZED HEALTH CARE EDUCATION/TRAINING PROGRAM

## 2022-02-19 PROCEDURE — 250N000013 HC RX MED GY IP 250 OP 250 PS 637

## 2022-02-19 PROCEDURE — 84132 ASSAY OF SERUM POTASSIUM: CPT | Performed by: OTOLARYNGOLOGY

## 2022-02-19 PROCEDURE — 36415 COLL VENOUS BLD VENIPUNCTURE: CPT | Performed by: OTOLARYNGOLOGY

## 2022-02-19 PROCEDURE — 83735 ASSAY OF MAGNESIUM: CPT | Performed by: OTOLARYNGOLOGY

## 2022-02-19 RX ADMIN — POLYETHYLENE GLYCOL 3350 17 G: 17 POWDER, FOR SOLUTION ORAL at 11:00

## 2022-02-19 RX ADMIN — OXYCODONE HYDROCHLORIDE 5 MG: 5 TABLET ORAL at 03:31

## 2022-02-19 RX ADMIN — WHITE PETROLATUM: 1.75 OINTMENT TOPICAL at 12:06

## 2022-02-19 RX ADMIN — Medication 1 MG: at 04:27

## 2022-02-19 RX ADMIN — SENNOSIDES AND DOCUSATE SODIUM 1 TABLET: 50; 8.6 TABLET ORAL at 10:57

## 2022-02-19 RX ADMIN — CHLORHEXIDINE GLUCONATE 0.12% ORAL RINSE 10 ML: 1.2 LIQUID ORAL at 10:59

## 2022-02-19 RX ADMIN — ACETAMINOPHEN 650 MG: 325 TABLET, FILM COATED ORAL at 10:54

## 2022-02-19 RX ADMIN — WHITE PETROLATUM: 1.75 OINTMENT TOPICAL at 04:30

## 2022-02-19 RX ADMIN — Medication 1 MG: at 12:04

## 2022-02-19 RX ADMIN — Medication 15 ML: at 10:59

## 2022-02-19 RX ADMIN — OXYCODONE HYDROCHLORIDE 5 MG: 5 TABLET ORAL at 10:55

## 2022-02-19 ASSESSMENT — ACTIVITIES OF DAILY LIVING (ADL)
ADLS_ACUITY_SCORE: 5

## 2022-02-19 NOTE — PLAN OF CARE
Status: L tonsillectomy, L MRND, NG tube placement 2/15  Vitals: VSS.  On RA     Neuros: A&Ox4.  Intact, except anxious at times.  Ativan (PRN) given x1.  Numbness at incision.  IV: PIV removed  Resp/trach: WNL   Diet: mechanical/dental soft diet.  TF boluses. Goal  6 cans/day.  Received 2 cans thus far today.  Tolerating well.  Bowel status: passing gas.  BS+. BM this shift, before discharge to home.  : voiding spont  Skin: L neck incision SAMARA with sutures.  CDI.  L neck JAYNA removed by MD in am.   Pain: neck/throat pain, managed with tylenol and oxycodone PRN.   Activity: independent  Plan: discharge home today.    Updates this shift: Discharge orders were reviewed with the patient and his wife.   Patient and his wife understand the discharge medications, discharge plan, and follow-up plan.  Phone numbers for the ENT clinic, in case of questions, were provided in discharge forms.  TF, and med administration teaching done at bedside with the patient this shift.  Patient performed med administration. Teaching has been reinforced.  TF and med administration teaching also done with the patient's wife. TF  supplies were sent home with the wife, yesterday.  Patient discharged today.

## 2022-02-19 NOTE — PLAN OF CARE
Status: L tonsillectomy, L MRND, NG tube placement 2/15  Vitals: VSS  Neuros: A&Ox4. Intermittently anxious. Neuros intact.  IV: PIV SL  Labs/Electrolytes: WNL  Resp/trach: WNL, Room air throughout the night   Diet: Mechanical soft diet, no PO intake this evening. TF 6/6 cans yesterday.  Bowel status: LBM 2/14, passing flatus  : Voiding  Skin: L neck incision SAMARA with sutures.  CDI.  L neck JAYNA x1  Pain: Incision and throat pain managed w/ PRN tylenol and oxy x1 this shift. Ativan given x1 for anxiety.  Activity: Up ad mariann  Plan: Plan to discharge home today.

## 2022-02-19 NOTE — PLAN OF CARE
Status: L tonsillectomy, L MRND, NG tube placement 2/15  Vitals: VSS, on RA satting around 93-94%  Neuros: A&Ox4. Can be anxious. Neuros intact.  IV: PIV SL  Labs/Electrolytes: WNL  Resp/trach: WNL  Diet: Mechanical soft diet, no PO intake this evening. TF 6/6 cans completed.  Bowel status: Passing gas, no BM this shift. LBM 2/14. BM meds given.  : Voiding  Skin: L neck incision SAMARA with sutures.  CDI.  L neck JAYNA x1  Pain: Incision and throat pain managed w/ PRN tylenol and oxy x1 this shift. Ativan given x1 for anxiety.  Activity: Up ad mariann  Plan: Discharge home tomorrow 2/19.   Updates this shift: Pt performed TF bolus and medication administrations independently t/o shift w/ RN supervision.

## 2022-02-19 NOTE — DISCHARGE SUMMARY
Discharge Summary  Flaco Willis III  [id number]  1955    Date of Admission: 2/15/2022  Date of Discharge: 2/19/2021    Admission Diagnosis: Malignant neoplasm of oropharynx (H) [C10.9]  Secondary malignancy of lymph nodes  Anxiety  Discharge Diagnosis: Same    Procedures:  Date: 2/15/2021  Procedure(s):  left radical tonsillectomy, nasogastric tube placement  left modified radical neck dissection.    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 He developed a left neck mass which was evaluated by his PCP and then referred to his local ENT. He had a CT neck performed on 11/16/2021 which showed a 2.5 x 2.0 cm neck mass. He had an U/S of the neck performed on 12/8/2021 which showed a 3.1 x 2.3 x 1.7 cm left neck mass.  He had an FNA of the neck mass on 12/8/2021 which showed p16+ SCC. He had a PET scan on 1/4/2022 which showed focal uptake in the left tonsil with no obvious CT correlate, 2 cm FDG avid left level IIA node, no distant disease. He saw medical oncology at [facility] and was recommended for radiation +/- chemotherapy, recommended against surgery due to the need for postoperative chemoradiation. He then transferred his care to the [facility]. He saw Dr Friedman on 1/19/2022 who discussed various treatment options with him. His case was discussed at tumor board with recommendations for surgery alone vs radiation alone, patient elected for surgery with radical tonsillectomy and neck dissection. It was recommended that he undergo operative intervention and the patient consented to the above procedure after detailed explanation of the risks and benefits of said procedure.    Hospital Course: The patient was admitted to the hospital and underwent the above mentioned procedure. He tolerated the procedure without any intra- or roberta-operative complications. Please see the operative report for full details of the procedure. The patient was admitted for  post-operative monitoring. His postoperative course was uneventful.  He had considerable issues with anxiety requiring medical management. He tolerated tube feed advancements and his home pain/sleep medication regimen was altered due to some postoperative desaturation while sleeping. This resolved prior to discharge. He was seen by the SLP team inpatient and cleared for a diet. At discharge, the patient's pain was well controlled, the patient was voiding on his own, and was ambulating and tolerating a soft diet in addition to his tube feeds. He demonstrated ability to care for his NG tube prior to discharge.      Discharge Exam:  Vitals:    02/18/22 1550 02/18/22 2300 02/19/22 0427 02/19/22 0729   BP: (!) 147/90 128/83 (!) 152/89 (!) 140/82   BP Location: Right arm Right arm  Right arm   Pulse: 107 98 99 105   Resp: 16 16  16   Temp: 97.8  F (36.6  C) 98.1  F (36.7  C)  (!) 96.6  F (35.9  C)   TempSrc: Oral Oral  Axillary   SpO2: 94% 92% 94% 94%   Weight:       Height:           GEN: Awake in bed, alert  RESP: Nonlabored breathing on room air  MOUTH: MMM. Left tonsillectomy wound with fibrinous debris as expected, no evidence of recent bleeding.   NECK: Incision is c/d/i. No swelling or erythema. Drain output is serosanguinous, drain removed and bandaid placed over drain hole.        Discharge Medications:     Medication List      Started    acetaminophen 325 MG tablet  Commonly known as: TYLENOL  650 mg, Per NG tube, EVERY 6 HOURS PRN     chlorhexidine 0.12 % solution  Commonly known as: PERIDEX  Swish and spit a small amount after every meal and before bed     LORazepam 2 MG/ML (HIGH CONC) oral solution  Commonly known as: ATIVAN  0.5-1 mg, Oral or Feeding Tube, 2 TIMES DAILY PRN  Replaces: LORazepam 0.5 MG tablet     melatonin 3 MG tablet  3 mg, Oral, AT BEDTIME PRN     mineral oil-hydrophilic petrolatum external ointment  Apply a small amount of ointment to the incision and drain sites three times per day and as  needed for crusting     naloxone 4 MG/0.1ML nasal spray  Commonly known as: NARCAN  4 mg, Alternating Nostrils, ONCE PRN, every 2-3 minutes until assistance arrives     oxyCODONE 5 MG tablet  Commonly known as: ROXICODONE  5 mg, Oral, EVERY 6 HOURS PRN     senna-docusate 8.6-50 MG tablet  Commonly known as: SENOKOT-S/PERICOLACE  1 tablet, Per NG tube, 2 TIMES DAILY        Discontinued    IBUPROFEN PO     LORazepam 0.5 MG tablet  Commonly known as: ATIVAN  Replaced by: LORazepam 2 MG/ML (HIGH CONC) oral solution     PENTASA PO     zolpidem 10 MG tablet  Commonly known as: AMBIEN            Discharge Procedure Orders   Reason for your hospital stay   Order Comments: Throat cancer     Activity   Order Comments: No bending, straining or lifting more than 20 pounds     Order Specific Question Answer Comments   Is discharge order? Yes      Adult [facility] Follow-up and recommended labs and tests   Order Comments: Follow-up on 2/23/22 with Dr. Valladares's nurse     Miscellaneous DME Order   Order Comments: DME Documentation:   Describe the reason for need to support medical necessity: limited PO intake due to oropharyngeal cancer resection..     I, the undersigned, certify that the above prescribed supplies are medically necessary for this patient and is both reasonable and necessary in reference to accepted standards of medical and necessary in reference to accepted standards of medical practice in the treatment of this patient's condition and is not prescribed as a convenience.     Order Specific Question Answer Comments   DME Provider: [facility]    DME Item Needed: Tube feed supplies    Length of Need: 1 month      Miscellaneous DME Order   Order Comments: Tube Feeding Supplies:  Osmolite 1.5 (may substitute equivalent formula),  6 cans per day  Gravity bags  Syringes  IV pole    DME Documentation:   Describe the reason for need to support medical necessity:  Limited PO intake due to oropharyngeal cancer resection.      I, the undersigned, certify that the above prescribed supplies are medically necessary for this patient and is both reasonable and necessary in reference to accepted standards of medical and necessary in reference to accepted standards of medical practice in the treatment of this patient's condition and is not prescribed as a convenience.     Order Specific Question Answer Comments   DME Provider: Other (comments)    DME Item Needed: Tube feeding supplies    Length of Need: 1 month      Diet   Order Comments: -Follow this diet upon discharge: Mechanical soft with thin liquids  -Continue doing tube feeds: 2 cans of osmolite 1.5 three times day. Administer 140mL of water before and after each bolus.     Order Specific Question Answer Comments   Is discharge order? Yes        Dispo: To home in good condition. All of the patient's questions/concerns have been addressed at this time. They are comfortable and independent in all cares.     Jennifer Cornell MD  Otolaryngology-Head & Neck Surgery, PGY-2  Please contact ENT by dialing [phone] and entering job code [phone].      Teaching statement:  I saw the patient on 2/19/2022 prior to discharge. I agree with the resident note. Explained follow-up plan to patient. Will need to see PCP for further refills of anti-anxiety medications, discussed that will not be prescribed long term by our team. Only able to provide 1 week of oxycodone, may need refill as outpatient pending pain. Given pain meds and benzos, given script for narcan at discharge.     Summer Valladares MD    Department of Otolaryngology

## 2022-02-20 LAB
PATH REPORT.COMMENTS IMP SPEC: NORMAL
PATH REPORT.COMMENTS IMP SPEC: NORMAL
PATH REPORT.FINAL DX SPEC: NORMAL
PATH REPORT.GROSS SPEC: NORMAL
PATH REPORT.INTRAOP OBS SPEC DOC: NORMAL
PATH REPORT.MICROSCOPIC SPEC OTHER STN: NORMAL
PATH REPORT.RELEVANT HX SPEC: NORMAL
PATHOLOGY SYNOPTIC REPORT: NORMAL
PHOTO IMAGE: NORMAL

## 2022-02-23 ENCOUNTER — THERAPY VISIT (OUTPATIENT)
Dept: SPEECH THERAPY | Facility: CLINIC | Age: 67
End: 2022-02-23
Payer: COMMERCIAL

## 2022-02-23 ENCOUNTER — OFFICE VISIT (OUTPATIENT)
Dept: OTOLARYNGOLOGY | Facility: CLINIC | Age: 67
End: 2022-02-23
Payer: COMMERCIAL

## 2022-02-23 VITALS
TEMPERATURE: 97.6 F | BODY MASS INDEX: 25.16 KG/M2 | SYSTOLIC BLOOD PRESSURE: 154 MMHG | HEIGHT: 68 IN | WEIGHT: 166 LBS | HEART RATE: 121 BPM | DIASTOLIC BLOOD PRESSURE: 94 MMHG

## 2022-02-23 DIAGNOSIS — R13.12 OROPHARYNGEAL DYSPHAGIA: ICD-10-CM

## 2022-02-23 DIAGNOSIS — C09.9 TONSILLAR CANCER (H): Primary | ICD-10-CM

## 2022-02-23 DIAGNOSIS — Z98.890 POSTOPERATIVE STATE: ICD-10-CM

## 2022-02-23 PROCEDURE — 92610 EVALUATE SWALLOWING FUNCTION: CPT | Mod: GN | Performed by: SPEECH-LANGUAGE PATHOLOGIST

## 2022-02-23 PROCEDURE — 99024 POSTOP FOLLOW-UP VISIT: CPT | Performed by: REGISTERED NURSE

## 2022-02-23 PROCEDURE — 92526 ORAL FUNCTION THERAPY: CPT | Mod: GN | Performed by: SPEECH-LANGUAGE PATHOLOGIST

## 2022-02-23 ASSESSMENT — PAIN SCALES - GENERAL: PAINLEVEL: MODERATE PAIN (4)

## 2022-02-23 NOTE — PROGRESS NOTES
February 23, 2022    Prior Oncologic History: Flaco Willis III is a 66 year old male with a history of a p16+ T1N1M0 SCC of left tonsil. Patient's case was discussed at Tumor Board with recommendations for surgery alone vs radiation alone. Patient opted for surgery. Patient is s/p left radical tonsillectomy, left modified radical neck dissection, and placement of nasogastric tube by Dr. Valladares on 2/15/22.      Interval History:   Patient comes in today for a post op follow up. Patient was discharged from the hospital on 2/19/22. Tolerated tube feeding advancement, seen by SLP team and cleared for a mechanical soft diet with thin liquids. Significant anxiety and sleep issues during inpatient stay. Recommendations to stop ambien and start melatonin.    Today patient states that he is doing ok. His pain has improved over the last couple of days. Continues to take oxycodone as needed for pain. He is taking only very minimal PO intake, primarily receiving nutrition from tube feeding. Tried a couple of pieces of cheese last night that caused dysphagia. Did try a small amount of beer last night. Reports difficulty opening mouth.     Patient reports submental swelling that is concerning to him. This does seem to be improving. Voice is also hoarse. Patient denies ear pain, bleeding from the mouth, or hemoptysis.    Past Medical History:  Past Medical History:   Diagnosis Date     ADD (attention deficit disorder)      Anxiety      Crohn's disease (H)      Hyperlipidemia      Hypertension      Liposarcoma of lower extremity (H)      Past Surgical History:  Past Surgical History:   Procedure Laterality Date     DISSECTION RADICAL NECK MODIFIED Left 2/15/2022    Procedure: left modified radical neck dissection.;  Surgeon: Summer Valladares MD;  Location: [facility] OR     EXCISE SOFT TISSUE TUMOR THIGH  11/21/2013    Procedure: EXCISE SOFT TISSUE TUMOR THIGH;  Excision of Tumor Bed Left Medial Thigh;  Surgeon: Seymour Jesus,  MD;  Location: [facility] OR     HERNIA REPAIR       TONSILLECTOMY ADULT Left 2/15/2022    Procedure: left radical tonsillectomy, nasogastric tube placement;  Surgeon: Summer Valladares MD;  Location: [facility] OR     tumor removal of thigh[       vocal cord surgery[       WRIST SURGERY       Medications:  Current Outpatient Medications   Medication Sig Dispense Refill     acetaminophen (TYLENOL) 325 MG tablet 2 tablets (650 mg) by Per NG tube route every 6 hours as needed for other (For optimal non-opioid multimodal pain management to improve pain control.) 30 tablet 0     aspirin 81 MG tablet Take 81 mg by mouth       atorvastatin (LIPITOR) 20 MG tablet Take 20 mg by mouth daily       busPIRone (BUSPAR) 10 MG tablet Take 10 mg by mouth 2 times daily        chlorhexidine (PERIDEX) 0.12 % solution Swish and spit a small amount after every meal and before bed 473 mL 0     Cholecalciferol (VITAMIN D3) 1000 UNITS CAPS Take 1,000 Units by mouth        dextroamphetamine (DEXEDRINE SPANSULE) 15 MG 24 hr capsule Take 15 mg by mouth        escitalopram (LEXAPRO) 10 MG tablet Take 10 mg by mouth daily       finasteride (PROPECIA) 1 MG tablet Take 1 mg by mouth daily        LORazepam (ATIVAN) 2 MG/ML (HIGH CONC) oral solution 0.25-0.5 mLs (0.5-1 mg) by Oral or Feeding Tube route 2 times daily as needed for anxiety 7 mL 0     melatonin 3 MG tablet Take 1 tablet (3 mg) by mouth nightly as needed for sleep 14 tablet 1     mineral oil-hydrophilic petrolatum (AQUAPHOR) external ointment Apply a small amount of ointment to the incision and drain sites three times per day and as needed for crusting 99 g 0     naloxone (NARCAN) 4 MG/0.1ML nasal spray Spray 1 spray (4 mg) into one nostril alternating nostrils once as needed for opioid reversal every 2-3 minutes until assistance arrives 0.1 mL 0     Omega-3 Fatty Acids (FISH OIL PO)        oxyCODONE (ROXICODONE) 5 MG tablet Take 1 tablet (5 mg) by mouth every 6 hours as needed for moderate to  "severe pain 40 tablet 0     senna-docusate (SENOKOT-S/PERICOLACE) 8.6-50 MG tablet 1 tablet by Per NG tube route 2 times daily 28 tablet 0     tadalafil (CIALIS) 20 MG tablet Take 20 mg by mouth       VITAMIN E MTC PO        Allergies:  Allergies   Allergen Reactions     Augmentin Rash        Social History:  Social History     Tobacco Use     Smoking status: Former Smoker     Types: Cigars     Start date: 1973     Quit date: 1990     Years since quittin.1     Smokeless tobacco: Never Used   Substance Use Topics     Alcohol use: Yes     Drug use: No     ROS: 10 point ROS neg other than the symptoms noted above in the HPI.    Physical Exam:    BP (!) 154/94   Pulse (!) 121   Temp 97.6  F (36.4  C)   Ht 1.727 m (5' 8\")   Wt 75.3 kg (166 lb)   BMI 25.24 kg/m    Wt Readings from Last 3 Encounters:   22 75.3 kg (166 lb)   02/15/22 74.9 kg (165 lb 2 oz)   22 75.8 kg (167 lb)        Constitutional:  The patient was well-groomed and in no acute distress.     Neurologic: Alert and oriented x 3.  CN's III-XII within normal limits.    Psychiatric: The patient's affect was calm, cooperative, and appropriate.   Communication:  Normal; communicates verbally, Hyper nasal quality.    Respiratory: Breathing comfortably without stridor or exertion of accessory muscles.    Oral Cavity: Slight limitation with mouth opening. Tongue with significant thick white coating.Floor of mouth, buccal mucosa, and palate normal.      Oropharynx: Left tonsillectomy wound with yellow slough covering base of wound with granulation tissue surrounding wound. No bleeding.   Neck: Left neck incision c/d/i without drainage, swelling, or redness. Normal range of motion. Moderate amount of submental swelling that is soft and nontender with palpation.     Procedure:   Left neck suture removal. Sutures removed without complication. Incision is intact. Clean and dry without drainage. Patient tolerated procedure " well.    Assessment/Plan:  1. Tonsillar cancer (H)  Patient is POD 8 from a left radical tonsillectomy, left modified radical neck dissection, and placement of nasogastric tube by Dr. Valladares for a p16+ T1N1M0 SCC of left tonsil. Patient is healing as anticipated. Left tonsillectomy wound with expected sloughing without evidence of bleeding. Significant debris on tongue. Recommend salt and soda rinses 3-4 times daily.    Patient will work with Saloni in SLP to determine when NG should be removed. I instructed patient to follow Saloni's recommendation for diet advancement. Instructed patient to refrain from any alcohol during this time of healing and when on narcotic pain medication.     Patient will receive update regarding pathology results from Dr. Valladares when results are available. Plan for further follow up with Dr. Valladares next week as scheduled on 3/2/22     Mandi Akhtar DNP, APRN, CNP  Otolaryngology  Head & Neck Surgery  [phone]    30 minutes spent on the date of the encounter doing chart review, history and exam, documentation and further activities per the note

## 2022-02-23 NOTE — PATIENT INSTRUCTIONS
- You were seen in the ENT Clinic today by Mandi Akhtar NP.   - Start salt and soda rinses: Mix 1/4 teaspoon of baking soda and 1/8 teaspoon of salt in 1 cup of warm water. Stir it up. Then swish it around in your mouth and spit it out. Do this 3-4 times during the day.  - Please send a [facility]rum message or call the ENT clinic at [phone] with any questions or concerns.

## 2022-02-23 NOTE — NURSING NOTE
"Chief Complaint   Patient presents with     RECHECK     post op      Blood pressure (!) 154/94, pulse (!) 121, temperature 97.6  F (36.4  C), height 1.727 m (5' 8\"), weight 75.3 kg (166 lb).  .Cole Dickson LPN    "

## 2022-02-23 NOTE — LETTER
2/23/2022       RE: Flaco Willis III  [address] [place] [address]     Dear Colleague,    Thank you for referring your patient, Flaco Willis III, to the [facility] [place] [facility]. Please see a copy of my visit note below.    February 23, 2022    Prior Oncologic History: Flaco Willis III is a 66 year old male with a history of a p16+ T1N1M0 SCC of left tonsil. Patient's case was discussed at Tumor Board with recommendations for surgery alone vs radiation alone. Patient opted for surgery. Patient is s/p left radical tonsillectomy, left modified radical neck dissection, and placement of nasogastric tube by Dr. Valladares on 2/15/22.      Interval History:   Patient comes in today for a post op follow up. Patient was discharged from the hospital on 2/19/22. Tolerated tube feeding advancement, seen by SLP team and cleared for a mechanical soft diet with thin liquids. Significant anxiety and sleep issues during inpatient stay. Recommendations to stop ambien and start melatonin.    Today patient states that he is doing ok. His pain has improved over the last couple of days. Continues to take oxycodone as needed for pain. He is taking only very minimal PO intake, primarily receiving nutrition from tube feeding. Tried a couple of pieces of cheese last night that caused dysphagia. Did try a small amount of beer last night. Reports difficulty opening mouth.     Patient reports submental swelling that is concerning to him. This does seem to be improving. Voice is also hoarse. Patient denies ear pain, bleeding from the mouth, or hemoptysis.    Past Medical History:  Past Medical History:   Diagnosis Date     ADD (attention deficit disorder)      Anxiety      Crohn's disease (H)      Hyperlipidemia      Hypertension      Liposarcoma of lower extremity (H)      Past Surgical History:  Past Surgical History:   Procedure Laterality Date      DISSECTION RADICAL NECK MODIFIED Left 2/15/2022    Procedure: left modified radical neck dissection.;  Surgeon: Summer Valladares MD;  Location: [facility] OR     EXCISE SOFT TISSUE TUMOR THIGH  11/21/2013    Procedure: EXCISE SOFT TISSUE TUMOR THIGH;  Excision of Tumor Bed Left Medial Thigh;  Surgeon: Seymour Jesus MD;  Location: [facility] OR     HERNIA REPAIR       TONSILLECTOMY ADULT Left 2/15/2022    Procedure: left radical tonsillectomy, nasogastric tube placement;  Surgeon: Summer Valladares MD;  Location: [facility] OR     tumor removal of thigh[       vocal cord surgery[       WRIST SURGERY       Medications:  Current Outpatient Medications   Medication Sig Dispense Refill     acetaminophen (TYLENOL) 325 MG tablet 2 tablets (650 mg) by Per NG tube route every 6 hours as needed for other (For optimal non-opioid multimodal pain management to improve pain control.) 30 tablet 0     aspirin 81 MG tablet Take 81 mg by mouth       atorvastatin (LIPITOR) 20 MG tablet Take 20 mg by mouth daily       busPIRone (BUSPAR) 10 MG tablet Take 10 mg by mouth 2 times daily        chlorhexidine (PERIDEX) 0.12 % solution Swish and spit a small amount after every meal and before bed 473 mL 0     Cholecalciferol (VITAMIN D3) 1000 UNITS CAPS Take 1,000 Units by mouth        dextroamphetamine (DEXEDRINE SPANSULE) 15 MG 24 hr capsule Take 15 mg by mouth        escitalopram (LEXAPRO) 10 MG tablet Take 10 mg by mouth daily       finasteride (PROPECIA) 1 MG tablet Take 1 mg by mouth daily        LORazepam (ATIVAN) 2 MG/ML (HIGH CONC) oral solution 0.25-0.5 mLs (0.5-1 mg) by Oral or Feeding Tube route 2 times daily as needed for anxiety 7 mL 0     melatonin 3 MG tablet Take 1 tablet (3 mg) by mouth nightly as needed for sleep 14 tablet 1     mineral oil-hydrophilic petrolatum (AQUAPHOR) external ointment Apply a small amount of ointment to the incision and drain sites three times per day and as needed for crusting 99 g 0     naloxone (NARCAN)  "4 MG/0.1ML nasal spray Spray 1 spray (4 mg) into one nostril alternating nostrils once as needed for opioid reversal every 2-3 minutes until assistance arrives 0.1 mL 0     Omega-3 Fatty Acids (FISH OIL PO)        oxyCODONE (ROXICODONE) 5 MG tablet Take 1 tablet (5 mg) by mouth every 6 hours as needed for moderate to severe pain 40 tablet 0     senna-docusate (SENOKOT-S/PERICOLACE) 8.6-50 MG tablet 1 tablet by Per NG tube route 2 times daily 28 tablet 0     tadalafil (CIALIS) 20 MG tablet Take 20 mg by mouth       VITAMIN E MTC PO        Allergies:  Allergies   Allergen Reactions     Augmentin Rash        Social History:  Social History     Tobacco Use     Smoking status: Former Smoker     Types: Cigars     Start date: 1973     Quit date: 1990     Years since quittin.1     Smokeless tobacco: Never Used   Substance Use Topics     Alcohol use: Yes     Drug use: No     ROS: 10 point ROS neg other than the symptoms noted above in the HPI.    Physical Exam:    BP (!) 154/94   Pulse (!) 121   Temp 97.6  F (36.4  C)   Ht 1.727 m (5' 8\")   Wt 75.3 kg (166 lb)   BMI 25.24 kg/m    Wt Readings from Last 3 Encounters:   22 75.3 kg (166 lb)   02/15/22 74.9 kg (165 lb 2 oz)   22 75.8 kg (167 lb)        Constitutional:  The patient was well-groomed and in no acute distress.     Neurologic: Alert and oriented x 3.  CN's III-XII within normal limits.    Psychiatric: The patient's affect was calm, cooperative, and appropriate.   Communication:  Normal; communicates verbally, Hyper nasal quality.    Respiratory: Breathing comfortably without stridor or exertion of accessory muscles.    Oral Cavity: Slight limitation with mouth opening. Tongue with significant thick white coating.Floor of mouth, buccal mucosa, and palate normal.      Oropharynx: Left tonsillectomy wound with yellow slough covering base of wound with granulation tissue surrounding wound. No bleeding.   Neck: Left neck incision c/d/i without " drainage, swelling, or redness. Normal range of motion. Moderate amount of submental swelling that is soft and nontender with palpation.     Procedure:   Left neck suture removal. Sutures removed without complication. Incision is intact. Clean and dry without drainage. Patient tolerated procedure well.    Assessment/Plan:  1. Tonsillar cancer (H)  Patient is POD 8 from a left radical tonsillectomy, left modified radical neck dissection, and placement of nasogastric tube by Dr. Valladares for a p16+ T1N1M0 SCC of left tonsil. Patient is healing as anticipated. Left tonsillectomy wound with expected sloughing without evidence of bleeding. Significant debris on tongue. Recommend salt and soda rinses 3-4 times daily.    Patient will work with Saloni in SLP to determine when NG should be removed. I instructed patient to follow Saloni's recommendation for diet advancement. Instructed patient to refrain from any alcohol during this time of healing and when on narcotic pain medication.     Patient will receive update regarding pathology results from Dr. Valladares when results are available. Plan for further follow up with Dr. Valladares next week as scheduled on 3/2/22     Mandi Akhtar DNP, APRN, CNP  Otolaryngology  Head & Neck Surgery  [phone]    30 minutes spent on the date of the encounter doing chart review, history and exam, documentation and further activities per the note        Again, thank you for allowing me to participate in the care of your patient.      Sincerely,    Xiomara Akhtar, NP

## 2022-02-25 ENCOUNTER — THERAPY VISIT (OUTPATIENT)
Dept: SPEECH THERAPY | Facility: CLINIC | Age: 67
End: 2022-02-25
Payer: COMMERCIAL

## 2022-02-25 ENCOUNTER — PATIENT OUTREACH (OUTPATIENT)
Dept: OTOLARYNGOLOGY | Facility: CLINIC | Age: 67
End: 2022-02-25
Payer: COMMERCIAL

## 2022-02-25 DIAGNOSIS — R13.12 OROPHARYNGEAL DYSPHAGIA: ICD-10-CM

## 2022-02-25 DIAGNOSIS — C09.9 TONSILLAR CANCER (H): Primary | ICD-10-CM

## 2022-02-25 PROCEDURE — 92526 ORAL FUNCTION THERAPY: CPT | Mod: GN | Performed by: SPEECH-LANGUAGE PATHOLOGIST

## 2022-02-25 NOTE — PROGRESS NOTES
"Reached out to patient regarding next week appointment with Dr. Valladares. Per Dr. Valladares:  \"Would like his appt from wed 3/2 moved to [facility] clinic on 3/4 at the 3:20 slot. He will need to be scheduled in with both myself and Dr Malone that day. Please let him that know that this way we can have him see both Dr Malone and I together, we can review path and next steps after we discuss his case at tumor board next Friday. It will be more productive of a conversation than him coming on Wednesday since we would not have reviewed his case at tumor board yet.\"    Patient in understanding and agreement to plan. No further questions at this time.    Fiona Humphrey RN on 2/25/2022 at 12:44 PM    "

## 2022-02-28 NOTE — PROGRESS NOTES
[facility]          OUTPATIENT SWALLOW  EVALUATION  PLAN OF TREATMENT FOR OUTPATIENT REHABILITATION  (COMPLETE FOR INITIAL CLAIMS ONLY)  Patient's Last Name, First Name, M.I.  YOB: 1955  Flaco Willis        Provider's Name   [facility]   Medical Record No.  [id number]     Start of Care Date:  02/23/22   Onset Date:  02/15/22   Type:     ___PT   ____OT  ___X_SLP Medical Diagnosis:   SCC tonsil     Treatment Diagnosis:  Mild-Moderate oropharyngeal dysphagia Visits from SOC:  1     _________________________________________________________________________________  Plan of Treatment/Functional Goals: 2x/month x 6 months      Goals   1. Goal Identifier: PO       Goal Description: Pt will tolerate regular solid and thin liquid without s/sx of aspiration/penetration 100% of the time independently.        Target Date: 05/24/22           2. Goal Identifier: Exercises       Goal Description: Pt will improve ROM and strength of BOT, jaw and pharynx by completing 10 repetitions of 5/5 exercises 3 times daily with minimal written or verbal cues.        Target Date: 05/24/22           3. Goal Identifier: Jaw ROM       Goal Description: Pt will complete jaw ROM exercises to increase range to >40mm.        Target Date: 05/24/22                Predicted Duration of Therapy Intervention (days/wks): 2-4 x/month x 6 months    Saloni Ventura, SLP       I CERTIFY THE NEED FOR THESE SERVICES FURNISHED UNDER        THIS PLAN OF TREATMENT AND WHILE UNDER MY CARE     (Physician attestation of this document indicates review and certification of the therapy plan).                           Referring Physician: Dr Summer Valladares    Initial Assessment        See Epic Evaluation Start Of Care Date: 02/23/22

## 2022-02-28 NOTE — PROGRESS NOTES
[unfilled]   I agree with the information in this note.    Summer Valladares

## 2022-02-28 NOTE — PROGRESS NOTES
Speech-Language Pathology Department   EVALUATION  [facility]  Clinical Swallow Evaluation      02/23/22 6262   General Information   Type Of Visit Initial   Start Of Care Date 02/23/22   Referring Physician Dr Summer Valladares   Orders Evaluate And Treat   Orders Comment Clinical Swallow Evaluation   Medical Diagnosis Tonsil Cancer   Onset Of Illness/injury Or Date Of Surgery 02/15/22   Hearing Adequate in quiet 1:1 setting   Pertinent History of Current Problem/OT: Additional Occupational Profile Info Flaco Willis III is a 66 year old man who underwent left radical tonsillectomy, left modified radical neck dissection, and placement of nasogastric tube by Dr. Valladares on 2/15/22 for SCC of the tonsil. He was seen today in conjunction with ENT clinic follow up visit. He has been using his NG for total support. He reports significant difficulties with swallowing. He would like to get the tube out and eat but is very anxious about the troubles he seems to be having since surgery with swallowing.    Respiratory Status Room air   Prior Level Of Function Swallowing   Prior Level Of Function Comment NJ in place, using since surgery. Reports difficulty swallowing and a single chokinig episode.    Patient Role/employment History Employed   Home/community Accessibility Comments (flowsheet Row) Independent. Lives at home with his wife   General Observations Pt pleasant and cooperative. Pt anxious about swallowing during evaluation.    Patient/family Goals Pt would like to return to eating and get his NG tube out.    Clinical Swallow Evaluation   Oral Musculature generally intact   Structural Abnormalities other (see comments)  (See H/P)   Dentition present and adequate   Mucosal Quality good   Mandibular Strength and Mobility impaired   Oral Labial Strength and Mobility WFL   Lingual Strength and Mobility WFL   Velar Elevation impaired  (s/p radical tonsilectomy)   Buccal  Strength and Mobility intact   Laryngeal Function Cough;Throat clear;Swallow;Voicing initiated   Clinical Swallow Eval: Thin Liquid Texture Trial   Mode of Presentation, Thin Liquids cup;self-fed   Volume of Liquid or Food Presented 6 oz water   Oral Phase of Swallow WFL   Pharyngeal Phase of Swallow throat clearing   Diagnostic Statement Pt demonstrated intermittent clinical s/sx of aspiration/penetration on thin liquid trials. These signs diminished slighly as trials progressed.   Clinical Swallow Eval: Puree Solid Texture Trial   Mode of Presentation, Puree spoon;self-fed   Volume of Puree Presented 4 oz apple sauce   Oral Phase, Puree WFL   Pharyngeal Phase, Puree impaired;repeated swallows;coughing/choking;feeling of something stuck in throat   Diagnostic Statement Pt demonstrated adequate oral manipulation of bolus. He had an episode of choking following several trials of puree when he was swallowing and talking He was cued to attend to task more closely to eliminate coughing/choking. He also reports feeling residual which is decreased with sip of liquid.    Clinical Swallow Eval: Soft & Bite-sized   Mode of Presentation self-fed   Volume Presented 1 strawberry shortbread cookie   Oral Phase WFL   Pharyngeal Phase impaired;repeated swallows   Diagnostic Statement No overt clinical s/sx of aspiration/penetration noted on small bite size trials. Pt took multiple very small trials.    Swallow Compensations   Swallow Compensations Alternate viscosity of consistencies;Reduce amounts;Multiple swallow;Effortful swallow   Educational Assessment   Barriers to Learning No barriers   Esophageal Phase of Swallow   Patient reports or presents with symptoms of esophageal dysphagia No   Swallow Eval: Clinical Impressions   Skilled Criteria for Therapy Intervention Skilled criteria met.  Treatment indicated.   Functional Assessment Scale (FAS) 4   Treatment Diagnosis Mild-Moderate oropharyngeal dysphagia   Diet texture  recommendations Minced & Moist diet (level 5);Thin liquids (level 0)   Recommended Feeding/Eating Techniques alternate between small bites and sips of food/liquid;maintain upright posture during/after eating for 30 mins;hard swallow w/ each bite or sip;small sips/bites   Rehab Potential good, to achieve stated therapy goals   Predicted Duration of Therapy Intervention (days/wks) 2-4 x/month x 6 months   Anticipated Discharge Disposition home w/ outpatient services   Risks and Benefits of Treatment have been explained. Yes   Patient, family and/or staff in agreement with Plan of Care Yes   Clinical Impression Comments Flaco Willis III demonstrates mild-moderate oropharyngeal dysphagia as characterized by intermittent s/sx of aspiration/penetration on thin liquid trials, repeated swallows on puree and solid consistencies. He demonstrated nasal reflux on a single sip of thin liquid. He demonstrated improved swallow if he took small sips of thin liquid and didn't talk while eating/swallowing. He was encouraged to focus on task to improve airway safety. Pt demonstrates decreased jaw ROM with limited opening at this time. He demonstrates adequate lingual, labial and buccal movement. He has decreased velar elevation partially due to surgical defect. He demonstrates clear vocal quality which improved as trials progressed. He demonstrates adequate mastication however was taking very small trials of cookie and puree. Occasionally, he would start to talk before swallowing and would end up coughing and choking. He was cued to take smaller amounts and really focus on swallowing. He was able to improve function with cues. He has been medically cleared to start soft foods and thin liquids. He will initiate thin liquids and purees. He will continue to use his NG tube if needed over the next few days. He will follow up later this week if he is able to increase his po intake and optimize oral nutrition/hydration. Recommend he  advance to soft solids and thin liquids. He will sit upright for po intake. Encouraged small bites/sips and slow rate. Optimize oral nutrition. Pt will benefit from speech pathology services to improve swallow safety and address post op dysphagia.   Swallow Goal 1   Goal Identifier PO   Goal Description Pt will tolerate regular solid and thin liquid without s/sx of aspiration/penetration 100% of the time independently.    Target Date 05/24/22   Swallow Goal 2   Goal Identifier Exercises   Goal Description Pt will improve ROM and strength of BOT, jaw and pharynx by completing 10 repetitions of 5/5 exercises 3 times daily with minimal written or verbal cues.    Target Date 05/24/22   Swallow Goal 3   Goal Identifier Jaw ROM   Goal Description Pt will complete jaw ROM exercises to increase range to >40mm.    Target Date 05/24/22   Total Session Time   SLP Eval: oral/pharyngeal swallow function, clinical minutes (71918) 15   Total Evaluation Time 15       Thank you for the referral of Flaco Willis III. If you have any questions about this report, please contact me using the information below.      Saloni Ventura MS, CCC-SLP  Speech-Language Pathology  [facility]  Department of Otolaryngology/D&T - 4th floor  Pager: [phone]  Phone: [phone]  Email: [email or web address]

## 2022-03-03 NOTE — PROGRESS NOTES
Dear Dr. Friedman:    I had the pleasure of seeing Flaco Willis III in [facility]     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 He developed a left neck mass which was evaluated by his PCP and then referred to his local ENT. He had a CT neck performed on 11/16/2021 which showed a 2.5 x 2.0 cm neck mass. He had an U/S of the neck performed on 12/8/2021 which showed a 3.1 x 2.3 x 1.7 cm left neck mass.  He had an FNA of the neck mass on 12/8/2021 which showed p16+ SCC. He had a PET scan on 1/4/2022 which showed focal uptake in the left tonsil with no obvious CT correlate, 2 cm FDG avid left level IIA node, no distant disease. He saw medical oncology at [facility] and was recommended for radiation +/- chemotherapy, recommended against surgery due to the need for postoperative chemoradiation. He then transferred his care to the [place]. He saw Dr Friedman on 1/19/2022 who discussed various treatment options with him.     Interval history:  He comes in today for follow-up. He was seen as a new patient in January 2022. He met with Dr Malone in January 2022 to discuss radiation. He decided to proceed with surgery. He was taken to the OR on 2/15/2022 for left radical tonsillectomy and left neck dissection (levels II-IV). Intraoperatively inferior tonsil margin was positive for SCC. A re-resection was performed. Final pathology demonstrated a p16+ SCC in the tonsil of 2.2 mm focus in the inferior margin, a 7 mm focus of SCC, DOI 1 mm in the inferior tonsil margin, and 2 mm of residual SCC in the re-resected inferior tonsil specimen, with closest margin 1.5 mm. There was a 2.6 cm metastatic node in level IIA, no IMANI, 18 remaining nodes negative. He was discharged with NG tube in place, removed on 2/25/2022. His case was discussed at tumor board today.    He says today that he is still having some pain. He  is eating everything. He has been working on his mouth opening, has some discomfort with maximal opening. He is doing the neck and shoulder exercises. He says that if he bends over with water in his mouth it will come out his nose. He still feels like his voice is not normal. He continues to have phlegm sitting in his throat.       MEDICATIONS:     Current Outpatient Medications   Medication Sig Dispense Refill     acetaminophen (TYLENOL) 325 MG tablet 2 tablets (650 mg) by Per NG tube route every 6 hours as needed for other (For optimal non-opioid multimodal pain management to improve pain control.) 30 tablet 0     aspirin 81 MG tablet Take 81 mg by mouth       atorvastatin (LIPITOR) 20 MG tablet Take 20 mg by mouth daily       busPIRone (BUSPAR) 10 MG tablet Take 10 mg by mouth 2 times daily        chlorhexidine (PERIDEX) 0.12 % solution Swish and spit a small amount after every meal and before bed 473 mL 0     Cholecalciferol (VITAMIN D3) 1000 UNITS CAPS Take 1,000 Units by mouth        dextroamphetamine (DEXEDRINE SPANSULE) 15 MG 24 hr capsule Take 15 mg by mouth        escitalopram (LEXAPRO) 10 MG tablet Take 10 mg by mouth daily       finasteride (PROPECIA) 1 MG tablet Take 1 mg by mouth daily        LORazepam (ATIVAN) 2 MG/ML (HIGH CONC) oral solution 0.25-0.5 mLs (0.5-1 mg) by Oral or Feeding Tube route 2 times daily as needed for anxiety 7 mL 0     melatonin 3 MG tablet Take 1 tablet (3 mg) by mouth nightly as needed for sleep 14 tablet 1     mineral oil-hydrophilic petrolatum (AQUAPHOR) external ointment Apply a small amount of ointment to the incision and drain sites three times per day and as needed for crusting 99 g 0     naloxone (NARCAN) 4 MG/0.1ML nasal spray Spray 1 spray (4 mg) into one nostril alternating nostrils once as needed for opioid reversal every 2-3 minutes until assistance arrives 0.1 mL 0     Omega-3 Fatty Acids (FISH OIL PO)        senna-docusate (SENOKOT-S/PERICOLACE) 8.6-50 MG tablet  1 tablet by Per NG tube route 2 times daily 28 tablet 0     tadalafil (CIALIS) 20 MG tablet Take 20 mg by mouth       VITAMIN E MTC PO          ALLERGIES:    Allergies   Allergen Reactions     Augmentin Rash       HABITS/SOCIAL HISTORY:   Smoked on weekends previously several years ago. No chewing tobacco. Drinks beer on weekends.   .   Works in [employer] - does a lot of on [employer] work.     Social History     Socioeconomic History     Marital status:      Spouse name: Not on file     Number of children: Not on file     Years of education: Not on file     Highest education level: Not on file   Occupational History     Not on file   Tobacco Use     Smoking status: Former Smoker     Types: Cigars     Start date: 1973     Quit date: 1990     Years since quittin.1     Smokeless tobacco: Never Used   Substance and Sexual Activity     Alcohol use: Yes     Drug use: No     Sexual activity: Yes     Partners: Female     Birth control/protection: Pull-out method   Other Topics Concern     Not on file   Social History Narrative     Not on file     Social Determinants of Health     Financial Resource Strain: Not on file   Food Insecurity: Not on file   Transportation Needs: Not on file   Physical Activity: Not on file   Stress: Not on file   Social Connections: Not on file   Intimate Partner Violence: Not on file   Housing Stability: Not on file       PAST MEDICAL HISTORY:   Past Medical History:   Diagnosis Date     ADD (attention deficit disorder)      Anxiety      Crohn's disease (H)      Hyperlipidemia      Hypertension      Liposarcoma of lower extremity (H)         PAST SURGICAL HISTORY:   Past Surgical History:   Procedure Laterality Date     DISSECTION RADICAL NECK MODIFIED Left 2/15/2022    Procedure: left modified radical neck dissection.;  Surgeon: Summer Valladares MD;  Location: [facility] OR     EXCISE SOFT TISSUE TUMOR THIGH  2013    Procedure: EXCISE SOFT TISSUE TUMOR THIGH;   "Excision of Tumor Bed Left Medial Thigh;  Surgeon: Seymour Jesus MD;  Location: [facility] OR     HERNIA REPAIR       TONSILLECTOMY ADULT Left 2/15/2022    Procedure: left radical tonsillectomy, nasogastric tube placement;  Surgeon: Summer Valladares MD;  Location: [facility] OR     tumor removal of thigh[       vocal cord surgery[       WRIST SURGERY         FAMILY HISTORY:  No family history on file.    REVIEW OF SYSTEMS:  12 point ROS was negative other than the symptoms noted above in the HPI.  Patient Supplied Answers to Review of Systems  [facility] ENT ROS 2/23/2022   Constitutional Problems with sleep   Ears, Nose, Throat Ear pain, Nasal congestion or drainage, Sore throat, Trouble swallowing, Hoarseness         PHYSICAL EXAMINATION:   BP (!) 167/101 (BP Location: Right arm, Patient Position: Sitting, Cuff Size: Adult Regular)   Ht 1.727 m (5' 8\")   Wt 72.1 kg (159 lb)   BMI 24.18 kg/m     Patient in NAD  Breathing comfortably on room air  Very minimal hypernasality to voice  Left radical tonsillectomy defect with some pooled secretions, healing appropriately, no bleeding  Neck incision healing appropriately, no swelling, no dehiscence, minor tenderness      PROCEDURES:         RESULTS REVIEWED:   Care discussed with SLP and Dr Malone    Path report reviewed      A. SUPERIOR TONSIL MUCOSA, EXCISION:  -Squamous mucosa, negative for malignancy.     B. MIDLINE POSTERIOR PHARYNGEAL WALL MUCOSA, EXCISION:  -Squamous mucosa and minor salivary gland tissue, negative for malignancy.     C. SOFT PALATE MUCOSAL WALL MARGIN, EXCISION:  -Squamous mucosa, minor salivary gland tissue and striated muscle, negative for malignancy.     D. LATERAL PHARYNGEAL WALL MARGIN, EXCISION:  -Squamous mucosa, minor salivary gland and lymphoid tissue, negative for malignancy.     E. INFERIOR TONSILLAR MUCOSAL MARGIN, EXCISION:  -HPV-ASSOCIATED SQUAMOUS CELL CARCINOMA, at least 7 mm in linear extent.  -Depth of invasion approximately 2 mm.     F. " BASAL TONGUE MUCOSAL MARGIN, EXCISION:  -Squamous mucosa and minor salivary gland tissue, negative for malignancy.     G. SUPERIOR CONSTRICTOR, EXCISION:  -Striated muscle, negative for malignancy.     H. DEEP BASAL TONGUE MARGIN, EXCISION:  -Striated muscle, negative for malignancy.     I.  LEFT TONSIL, RADICAL TONSILLECTOMY:  -HPV-ASSOCIATED SQUAMOUS CELL CARCINOMA, 2.2 mm focus present at the inferior margin in this part (not final, see parts E and N).  -The rest of the specimen is negative for malignancy (including all other margins).  -Background tonsil with reactive lymphoid/follicular hyperplasia.  -Actinomyces organisms are present.  -AJCC pathologic staging is pT1 N1.  -See synoptic report.     J.  LYMPH NODES, LEFT LEVEL 2A, NECK DISSECTION:  -METASTATIC SQUAMOUS CELL CARCINOMA to one of six lymph nodes, 2.6 cm in greatest dimension, with no extranodal extension (1/6).     K.  LYMPH NODES, LEFT LEVEL 3, NECK DISSECTION:  -Five lymph nodes, negative for malignancy (0/5).    L.  LYMPH NODES, LEFT LEVEL 4, NECK DISSECTION:  -Four lymph nodes, negative for malignancy (0/4).     M.  LYMPH NODES, LEFT LEVEL 2B, NECK DISSECTION:  -Four lymph nodes, negative for malignancy (0/4).     N. ADDITIONAL INFERIOR TONSIL, RE-RESECTION:  -RESIDUAL SQUAMOUS CELL CARCINOMA (in continuation with the inferior margin of specimen E), up to 2 mm in linear extent.  -All final margins are negative for tumor; closest margin in this part is deep at 1.5 mm (all other margins at least 3 mm from carcinoma).      IMPRESSION AND PLAN:   Flaco Willis is a 66 year old man with a p16+ T1N1 SCC of the left tonsil. He underwent surgical resection with left radical tonsillectomy and left neck dissection on 2/15/2022.    Pathology was reviewed today. We discussed that while there was a positive margin this was re-resected with ultimately all margins being negative.    His case was discussed at tumor board today. Dr Malone and I discussed options  with him today. Dr Malone discussed the option of radiation and the risk reduction associated with it, with recommendations for observation.    He was seen by SLP today. We discussed continuing a soft diet for another 1-2 weeks.     We discussed sun protection for the incision and massage for the incision.     He will need his 3 month post treatment PET scan on 5/10/2022.    I will see him back in 1 month and then with his PET scan.    Thank you very much for the opportunity to participate in the care of your patient.      Summer Valladares MD  Otolaryngology- Head & Neck Surgery      This note was dictated with voice recognition software and then edited. Please excuse any unintentional errors.             CC:  Ashok Friedman MD  Department of Medical Oncology  [facility]      Alcides Malone MD  Department of Radiation Oncology  [facility]

## 2022-03-04 ENCOUNTER — OFFICE VISIT (OUTPATIENT)
Dept: OTOLARYNGOLOGY | Facility: CLINIC | Age: 67
End: 2022-03-04
Payer: COMMERCIAL

## 2022-03-04 ENCOUNTER — TUMOR CONFERENCE (OUTPATIENT)
Dept: ONCOLOGY | Facility: CLINIC | Age: 67
End: 2022-03-04

## 2022-03-04 ENCOUNTER — THERAPY VISIT (OUTPATIENT)
Dept: SPEECH THERAPY | Facility: CLINIC | Age: 67
End: 2022-03-04
Payer: COMMERCIAL

## 2022-03-04 VITALS
WEIGHT: 159 LBS | HEIGHT: 68 IN | BODY MASS INDEX: 24.1 KG/M2 | DIASTOLIC BLOOD PRESSURE: 101 MMHG | SYSTOLIC BLOOD PRESSURE: 167 MMHG

## 2022-03-04 DIAGNOSIS — C10.9 MALIGNANT NEOPLASM OF OROPHARYNX (H): Primary | ICD-10-CM

## 2022-03-04 DIAGNOSIS — C77.0 SECONDARY MALIGNANCY OF LYMPH NODES OF HEAD, FACE AND NECK (H): ICD-10-CM

## 2022-03-04 DIAGNOSIS — C09.9 TONSILLAR CANCER (H): Primary | ICD-10-CM

## 2022-03-04 DIAGNOSIS — R13.12 OROPHARYNGEAL DYSPHAGIA: ICD-10-CM

## 2022-03-04 PROCEDURE — 99213 OFFICE O/P EST LOW 20 MIN: CPT | Performed by: RADIOLOGY

## 2022-03-04 PROCEDURE — 92526 ORAL FUNCTION THERAPY: CPT | Mod: GN | Performed by: SPEECH-LANGUAGE PATHOLOGIST

## 2022-03-04 PROCEDURE — 99024 POSTOP FOLLOW-UP VISIT: CPT | Performed by: OTOLARYNGOLOGY

## 2022-03-04 ASSESSMENT — PAIN SCALES - GENERAL: PAINLEVEL: NO PAIN (0)

## 2022-03-04 NOTE — NURSING NOTE
"Chief Complaint   Patient presents with     RECHECK     Discuss pathology and next steps       Blood pressure (!) 167/101, height 1.727 m (5' 8\"), weight 72.1 kg (159 lb).    Risa Givens, EMT    "

## 2022-03-04 NOTE — LETTER
3/4/2022       RE: Flaco Willis III  [address] [place] [address]          Department of Radiation Oncology  [address] [address]  [phone]       Radiation Oncology Follow-up Visit  2022      Flaco Willis III  MRN: [id number]   : 1955     DISEASE:   pT1 N1 M0 p16-positive squamous cell carcinoma of the left tonsil    SUBJECTIVE:   Flaco Willis III is a 66 year old male with a PMH significant for a p16-positive pT1 N1 M0 squamous cell carcinoma of the left tonsil status post left tonsillectomy and ipsilateral neck dissection. For complete details on his pre-treatment history and work-up, please see my consultation note from 2022. Since that encounter, he underwent a left radical tonsillectomy and ipsilateral neck dissection by Dr. Valladares on 2/15/2022. Surgical pathology demonstrated a 2.2 mm focus within the inferior margin of the tonsil with a 7 mm focus of disease within the inferior tonsillar margin and a 2 mm focus of residual tumor in the resected inferior tonsillar specimen with a closest margin of 1.5 mm. He had metastatic involvement of a single level 2A lymph node measuring 2.6 cm with no extranodal extension. The remainder of the 18 dissected lymph nodes were negative for metastatic disease. He returns to clinic today to review pathology and discuss treatment options moving forward.    PHYSICAL EXAM:  Weight: 72.1 kg  BP: 167/101    General: Healthy-appearing 66-year-old gentleman seated comfortably in an examination chair in no acute distress  HEENT: NC/AT. EOMI. No rhinorrhea or epistaxis. Moist mucous membranes. Defect from left tonsillectomy. Surrounding mucosa is healthy in appearance with no nodularity, induration or masses.  Neck: Left neck incision is clean/dry/intact. No palpable cervical adenopathy bilaterally.  Pulmonary: No wheezing, stridor or respiratory distress  Skin: Normal color and  pedro pablo    LABS AND IMAGING:  No recent labs or imaging    IMPRESSION:   Mr. Willis is a 66 year old male with a pT1 N1 M0 p16-positive squamous cell carcinoma of the left tonsil s/p left radical tonsillectomy and ipsilateral lymph node dissection. Surgical pathology reveals a small primary tumor with a close albeit negative surgical margin along the inferior aspect of the specimen.    PLAN:   I had a long discussion with Mr. Willis and his wife regarding the surgical pathology findings from the resection of his early stage disease. While he did have metastatic involvement of a single ipsilateral level 2 node, he had no evidence of extranodal extension and the node itself was sub-3 cm in size. His primary tumor was likewise small and, aside from the initially positive inferior margin which was reresected with a final close, but negative margin, he has minimal risk factors associated with this tumor.     I spent the majority of our discussion regarding this latter point (primary tumor margin status) and its ramifications on disease recurrence rates. In short, he is likely at a low to low-intermediate risk of disease recurrence and adjuvant radiotherapy would not add significantly in terms of absolute benefit to his disease control. On that same token, adjuvant radiotherapy would add to his treatment-induced toxicity with some effects likely long-lasting after treatment. Given this risk/benefit ratio, I did not feel strongly that adjuvant radiotherapy is warranted in his case. I instead recommended continued close surveillance with Dr. Valladares and the ENT service to which Mr. Willis was agreeable. I am happy to see Mr. Willis back in clinic at any time should he have further questions/concerns however he will otherwise continue to follow-up with his ENT team for ongoing disease surveillance.    Alcides Malone MD/PhD    Department of Radiation Oncology  [facility]

## 2022-03-04 NOTE — PROGRESS NOTES
Dr. Valladares and Dr. Malone saw patient in clinic today and reviewed tumor board discussion and recommendations.     Lesa Carrillo, RN, BSN

## 2022-03-04 NOTE — PROGRESS NOTES
Head & Neck Tumor Conference Note     Status: Established   Staff: Dr. 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 He developed a left neck mass which was evaluated by his PCP and then referred to his local ENT. He had a CT neck performed on 2021 which showed a 2.5 x 2.0 cm neck mass. He had an U/S of the neck performed on 2021 which showed a 3.1 x 2.3 x 1.7 cm left neck mass.  He had an FNA of the neck mass on 2021 which showed p16+ SCC. He had a PET scan on 2022 which showed focal uptake in the left tonsil with no obvious CT correlate, 2 cm FDG avid left level IIA node, no distant disease. He is not s/p the above procedue 2/15. He is being presented to review path and POC.    Pertinent PMH:   Past Medical History:   Diagnosis Date     ADD (attention deficit disorder)      Anxiety      Crohn's disease (H)      Hyperlipidemia      Hypertension      Liposarcoma of lower extremity (H)         Social Hx:   Social History     Tobacco Use     Smoking status: Former Smoker     Types: Cigars     Start date: 1973     Quit date: 1990     Years since quittin.1     Smokeless tobacco: Never Used   Substance Use Topics     Alcohol use: Yes     Drug use: No     Medication: ambien, escitalopram, buspirone, ASA 81mg, lipitor,  lorzepam,  finasteride    Allergies: Augmentin    Imaging: No new images to review     Pathology:   Final Diagnosis   A. SUPERIOR TONSIL MUCOSA, EXCISION:  -Squamous mucosa, negative for malignancy.     B. MIDLINE POSTERIOR PHARYNGEAL WALL MUCOSA, EXCISION:  -Squamous  mucosa and minor salivary gland tissue, negative for malignancy.     C. SOFT PALATE MUCOSAL WALL MARGIN, EXCISION:  -Squamous mucosa, minor salivary gland tissue and striated muscle, negative for malignancy.     D. LATERAL PHARYNGEAL WALL MARGIN, EXCISION:  -Squamous mucosa, minor salivary gland and lymphoid tissue, negative for malignancy.     E. INFERIOR TONSILLAR MUCOSAL MARGIN, EXCISION:  -HPV-ASSOCIATED SQUAMOUS CELL CARCINOMA, at least 7 mm in linear extent.  -Depth of invasion approximately 2 mm.     F. BASAL TONGUE MUCOSAL MARGIN, EXCISION:  -Squamous mucosa and minor salivary gland tissue, negative for malignancy.     G. SUPERIOR CONSTRICTOR, EXCISION:  -Striated muscle, negative for malignancy.     H. DEEP BASAL TONGUE MARGIN, EXCISION:  -Striated muscle, negative for malignancy.     I.  LEFT TONSIL, RADICAL TONSILLECTOMY:  -HPV-ASSOCIATED SQUAMOUS CELL CARCINOMA, 2.2 mm focus present at the inferior margin in this part (not final, see parts E and N).  -The rest of the specimen is negative for malignancy (including all other margins).  -Background tonsil with reactive lymphoid/follicular hyperplasia.  -Actinomyces organisms are present.  -AJCC pathologic staging is pT1 N1.  -See synoptic report.     J.  LYMPH NODES, LEFT LEVEL 2A, NECK DISSECTION:  -METASTATIC SQUAMOUS CELL CARCINOMA to one of six lymph nodes, 2.6 cm in greatest dimension, with no extranodal extension (1/6).     K.  LYMPH NODES, LEFT LEVEL 3, NECK DISSECTION:  -Five lymph nodes, negative for malignancy (0/5).    L.  LYMPH NODES, LEFT LEVEL 4, NECK DISSECTION:  -Four lymph nodes, negative for malignancy (0/4).     M.  LYMPH NODES, LEFT LEVEL 2B, NECK DISSECTION:  -Four lymph nodes, negative for malignancy (0/4).     N. ADDITIONAL INFERIOR TONSIL, RE-RESECTION:  -RESIDUAL SQUAMOUS CELL CARCINOMA (in continuation with the inferior margin of specimen E), up to 2 mm in linear extent.  -All final margins are negative for tumor; closest margin in  this part is deep at 1.5 mm (all other margins at least 3 mm from carcinoma).       Tumor Board Recommendation:   Discussion: This is a 66 year old male with T1N1M0 p16+ SCC left tonsil s/p Radical tonsillectomy, left neck dissection Level II-IV 2/15/22.    Pathology was reviewed:  - Left tonsil with 2.2mm tumor focus with 2mm DOI. Inferior tonsil margin 7mm tumor fous which was re-resected to negative margins. Closest margin 1.5mm.   - Currently no indication for adjuvant therapies      Plan:   - Routine cancer surviellance.     Gail Blabuena MD, PGY-3  Otolaryngology- Head and Neck Surgery    Documentation / Disclaimer Cancer Tumor Board Note  Cancer tumor board recommendations do not override what is determined to be reasonable care and treatment, which is dependent on the circumstances of a patient's case; the patient's medical, social, and personal concerns; and the clinical judgment of the oncologist [physician].

## 2022-03-04 NOTE — LETTER
3/4/2022       RE: Flaco Willis III  [address] [place] [address]     Dear Colleague,    Thank you for referring your patient, Flaco Willis III, to the [facility] [place] [facility]. Please see a copy of my visit note below.    Dear Dr. Friedman:    I had the pleasure of seeing Flaco Willis III in follow-up today at the [facility].     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 He developed a left neck mass which was evaluated by his PCP and then referred to his local ENT. He had a CT neck performed on 11/16/2021 which showed a 2.5 x 2.0 cm neck mass. He had an U/S of the neck performed on 12/8/2021 which showed a 3.1 x 2.3 x 1.7 cm left neck mass.  He had an FNA of the neck mass on 12/8/2021 which showed p16+ SCC. He had a PET scan on 1/4/2022 which showed focal uptake in the left tonsil with no obvious CT correlate, 2 cm FDG avid left level IIA node, no distant disease. He saw medical oncology at [facility] and was recommended for radiation +/- chemotherapy, recommended against surgery due to the need for postoperative chemoradiation. He then transferred his care to the [place]. He saw Dr Friedman on 1/19/2022 who discussed various treatment options with him.     Interval history:  He comes in today for follow-up. He was seen as a new patient in January 2022. He met with Dr Malone in January 2022 to discuss radiation. He decided to proceed with surgery. He was taken to the OR on 2/15/2022 for left radical tonsillectomy and left neck dissection (levels II-IV). Intraoperatively inferior tonsil margin was positive for SCC. A re-resection was performed. Final pathology demonstrated a p16+ SCC in the tonsil of 2.2 mm focus in the inferior margin, a 7 mm focus of SCC, DOI 1 mm in the inferior tonsil margin, and 2 mm of residual  SCC in the re-resected inferior tonsil specimen, with closest margin 1.5 mm. There was a 2.6 cm metastatic node in level IIA, no IMANI, 18 remaining nodes negative. He was discharged with NG tube in place, removed on 2/25/2022. His case was discussed at tumor board today.    He says today that he is still having some pain. He is eating everything. He has been working on his mouth opening, has some discomfort with maximal opening. He is doing the neck and shoulder exercises. He says that if he bends over with water in his mouth it will come out his nose. He still feels like his voice is not normal. He continues to have phlegm sitting in his throat.       MEDICATIONS:     Current Outpatient Medications   Medication Sig Dispense Refill     acetaminophen (TYLENOL) 325 MG tablet 2 tablets (650 mg) by Per NG tube route every 6 hours as needed for other (For optimal non-opioid multimodal pain management to improve pain control.) 30 tablet 0     aspirin 81 MG tablet Take 81 mg by mouth       atorvastatin (LIPITOR) 20 MG tablet Take 20 mg by mouth daily       busPIRone (BUSPAR) 10 MG tablet Take 10 mg by mouth 2 times daily        chlorhexidine (PERIDEX) 0.12 % solution Swish and spit a small amount after every meal and before bed 473 mL 0     Cholecalciferol (VITAMIN D3) 1000 UNITS CAPS Take 1,000 Units by mouth        dextroamphetamine (DEXEDRINE SPANSULE) 15 MG 24 hr capsule Take 15 mg by mouth        escitalopram (LEXAPRO) 10 MG tablet Take 10 mg by mouth daily       finasteride (PROPECIA) 1 MG tablet Take 1 mg by mouth daily        LORazepam (ATIVAN) 2 MG/ML (HIGH CONC) oral solution 0.25-0.5 mLs (0.5-1 mg) by Oral or Feeding Tube route 2 times daily as needed for anxiety 7 mL 0     melatonin 3 MG tablet Take 1 tablet (3 mg) by mouth nightly as needed for sleep 14 tablet 1     mineral oil-hydrophilic petrolatum (AQUAPHOR) external ointment Apply a small amount of ointment to the incision and drain sites three times per  day and as needed for crusting 99 g 0     naloxone (NARCAN) 4 MG/0.1ML nasal spray Spray 1 spray (4 mg) into one nostril alternating nostrils once as needed for opioid reversal every 2-3 minutes until assistance arrives 0.1 mL 0     Omega-3 Fatty Acids (FISH OIL PO)        senna-docusate (SENOKOT-S/PERICOLACE) 8.6-50 MG tablet 1 tablet by Per NG tube route 2 times daily 28 tablet 0     tadalafil (CIALIS) 20 MG tablet Take 20 mg by mouth       VITAMIN E MTC PO          ALLERGIES:    Allergies   Allergen Reactions     Augmentin Rash       HABITS/SOCIAL HISTORY:   Smoked on weekends previously several years ago. No chewing tobacco. Drinks beer on weekends.   .   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     Social History     Socioeconomic History     Marital status:      Spouse name: Not on file     Number of children: Not on file     Years of education: Not on file     Highest education level: Not on file   Occupational History     Not on file   Tobacco Use     Smoking status: Former Smoker     Types: Cigars     Start date: 1973     Quit date: 1990     Years since quittin.1     Smokeless tobacco: Never Used   Substance and Sexual Activity     Alcohol use: Yes     Drug use: No     Sexual activity: Yes     Partners: Female     Birth control/protection: Pull-out method   Other Topics Concern     Not on file   Social History Narrative     Not on file     Social Determinants of Health     Financial Resource Strain: Not on file   Food Insecurity: Not on file   Transportation Needs: Not on file   Physical Activity: Not on file   Stress: Not on file   Social Connections: Not on file   Intimate Partner Violence: Not on file   Housing Stability: Not on file       PAST MEDICAL HISTORY:   Past Medical History:   Diagnosis Date     ADD (attention deficit disorder)      Anxiety      Crohn's disease (H)      Hyperlipidemia      Hypertension      Liposarcoma of lower extremity (H)      "    PAST SURGICAL HISTORY:   Past Surgical History:   Procedure Laterality Date     DISSECTION RADICAL NECK MODIFIED Left 2/15/2022    Procedure: left modified radical neck dissection.;  Surgeon: Summer Valladares MD;  Location: [facility] OR     EXCISE SOFT TISSUE TUMOR THIGH  11/21/2013    Procedure: EXCISE SOFT TISSUE TUMOR THIGH;  Excision of Tumor Bed Left Medial Thigh;  Surgeon: Seymour Jesus MD;  Location: [facility] OR     HERNIA REPAIR       TONSILLECTOMY ADULT Left 2/15/2022    Procedure: left radical tonsillectomy, nasogastric tube placement;  Surgeon: Summer Valladares MD;  Location: [facility] OR     tumor removal of thigh[       vocal cord surgery[       WRIST SURGERY         FAMILY HISTORY:  No family history on file.    REVIEW OF SYSTEMS:  12 point ROS was negative other than the symptoms noted above in the HPI.  Patient Supplied Answers to Review of Systems  [facility] ENT ROS 2/23/2022   Constitutional Problems with sleep   Ears, Nose, Throat Ear pain, Nasal congestion or drainage, Sore throat, Trouble swallowing, Hoarseness         PHYSICAL EXAMINATION:   BP (!) 167/101 (BP Location: Right arm, Patient Position: Sitting, Cuff Size: Adult Regular)   Ht 1.727 m (5' 8\")   Wt 72.1 kg (159 lb)   BMI 24.18 kg/m     Patient in NAD  Breathing comfortably on room air  Very minimal hypernasality to voice  Left radical tonsillectomy defect with some pooled secretions, healing appropriately, no bleeding  Neck incision healing appropriately, no swelling, no dehiscence, minor tenderness      PROCEDURES:         RESULTS REVIEWED:   Care discussed with SLP and Dr Malone    Path report reviewed      A. SUPERIOR TONSIL MUCOSA, EXCISION:  -Squamous mucosa, negative for malignancy.     B. MIDLINE POSTERIOR PHARYNGEAL WALL MUCOSA, EXCISION:  -Squamous mucosa and minor salivary gland tissue, negative for malignancy.     C. SOFT PALATE MUCOSAL WALL MARGIN, EXCISION:  -Squamous mucosa, minor salivary gland tissue and striated muscle, " negative for malignancy.     D. LATERAL PHARYNGEAL WALL MARGIN, EXCISION:  -Squamous mucosa, minor salivary gland and lymphoid tissue, negative for malignancy.     E. INFERIOR TONSILLAR MUCOSAL MARGIN, EXCISION:  -HPV-ASSOCIATED SQUAMOUS CELL CARCINOMA, at least 7 mm in linear extent.  -Depth of invasion approximately 2 mm.     F. BASAL TONGUE MUCOSAL MARGIN, EXCISION:  -Squamous mucosa and minor salivary gland tissue, negative for malignancy.     G. SUPERIOR CONSTRICTOR, EXCISION:  -Striated muscle, negative for malignancy.     H. DEEP BASAL TONGUE MARGIN, EXCISION:  -Striated muscle, negative for malignancy.     I.  LEFT TONSIL, RADICAL TONSILLECTOMY:  -HPV-ASSOCIATED SQUAMOUS CELL CARCINOMA, 2.2 mm focus present at the inferior margin in this part (not final, see parts E and N).  -The rest of the specimen is negative for malignancy (including all other margins).  -Background tonsil with reactive lymphoid/follicular hyperplasia.  -Actinomyces organisms are present.  -AJCC pathologic staging is pT1 N1.  -See synoptic report.     J.  LYMPH NODES, LEFT LEVEL 2A, NECK DISSECTION:  -METASTATIC SQUAMOUS CELL CARCINOMA to one of six lymph nodes, 2.6 cm in greatest dimension, with no extranodal extension (1/6).     K.  LYMPH NODES, LEFT LEVEL 3, NECK DISSECTION:  -Five lymph nodes, negative for malignancy (0/5).    L.  LYMPH NODES, LEFT LEVEL 4, NECK DISSECTION:  -Four lymph nodes, negative for malignancy (0/4).     M.  LYMPH NODES, LEFT LEVEL 2B, NECK DISSECTION:  -Four lymph nodes, negative for malignancy (0/4).     N. ADDITIONAL INFERIOR TONSIL, RE-RESECTION:  -RESIDUAL SQUAMOUS CELL CARCINOMA (in continuation with the inferior margin of specimen E), up to 2 mm in linear extent.  -All final margins are negative for tumor; closest margin in this part is deep at 1.5 mm (all other margins at least 3 mm from carcinoma).      IMPRESSION AND PLAN:   Flaco Willis is a 66 year old man with a p16+ T1N1 SCC of the left tonsil.  He underwent surgical resection with left radical tonsillectomy and left neck dissection on 2/15/2022.    Pathology was reviewed today. We discussed that while there was a positive margin this was re-resected with ultimately all margins being negative.    His case was discussed at tumor board today. Dr Malone and I discussed options with him today. Dr Malone discussed the option of radiation and the risk reduction associated with it, with recommendations for observation.    He was seen by SLP today. We discussed continuing a soft diet for another 1-2 weeks.     We discussed sun protection for the incision and massage for the incision.     He will need his 3 month post treatment PET scan on 5/10/2022.    I will see him back in 1 month and then with his PET scan.    Thank you very much for the opportunity to participate in the care of your patient.      Summer Valladares MD  Otolaryngology- Head & Neck Surgery    This note was dictated with voice recognition software and then edited. Please excuse any unintentional errors.     CC:  Ashok Friedman MD  Department of Medical Oncology  [facility]    Alcides Malone MD  Department of Radiation Oncology  [facility]

## 2022-03-07 ENCOUNTER — TELEPHONE (OUTPATIENT)
Dept: OTOLARYNGOLOGY | Facility: CLINIC | Age: 67
End: 2022-03-07
Payer: COMMERCIAL

## 2022-03-07 NOTE — TELEPHONE ENCOUNTER
Received call from patient regarding appointment on Friday. Patient had questions regarding Dr. Valladares's recommendation to massage the incision area and what that does to help. Discussed that it can help bring blood flow to the area and break down scar tissue to promote overall good healing to the incision area. Patient has no further questions at this time.    Fiona Humphrey RN on 3/7/2022 at 2:36 PM

## 2022-03-11 ENCOUNTER — TELEPHONE (OUTPATIENT)
Dept: OTOLARYNGOLOGY | Facility: CLINIC | Age: 67
End: 2022-03-11
Payer: COMMERCIAL

## 2022-03-11 NOTE — TELEPHONE ENCOUNTER
"Received call from patient with a question. Patient is wondering about food/ddiet. Patient states that when he first starts to eat in the morning that it tastes \"funny\", but then it goes away after about one minute. Discussed with patient that this can be normal and a part of the healing process after surgery. Considering that it goes away immediately this does not appear to be concerning. Encouraged patient that if things change or if he has any signs of infection to let the clinic know.    Fiona Humphrey RN on 3/11/2022 at 1:41 PM    "

## 2022-03-15 ENCOUNTER — TELEPHONE (OUTPATIENT)
Dept: SPEECH THERAPY | Facility: CLINIC | Age: 67
End: 2022-03-15
Payer: COMMERCIAL

## 2022-03-15 NOTE — TELEPHONE ENCOUNTER
"Received voicemail from patient requesting call back to discuss questions. Called patient and spoke with him regarding his concerns. States he is still having throat pain and discomfort especially when eating. States he has an \"odd sensation\" for the first few bites and then this resolves. He wants to know if this is normal. Reassured pt healing after tonsillectomy takes time and it is not uncommon to have different sensations in the throat especially when eating/drinking. We would expect that to continue to decrease over time. Pt also inquired whether he can have beer. States he was told not to for concern for a yeast infection. Trained pt this clinician would advise avoiding alcohol and for pt to have further discussion with Dr. Valladares regarding this question. Pt appreciative of call and states he has no further questions at this time.    ROSALBA Hamm. (kishore), MA, CCC-SLP   Speech Language Pathologist  [facility] Trained Vocologist   [facility] [place]  Dept. of Otolaryngology  Department of Rehabilitation Services  [address] [place] [id number]  Email: [email or web address]   Phone: [phone]  Pronouns: she/her/hers    "

## 2022-03-15 NOTE — PROGRESS NOTES
Department of Radiation Oncology  [facility]  [address] [place] [address]  [phone]       Radiation Oncology Follow-up Visit  2022      Flaco Willis III  MRN: [id number]   : 1955     DISEASE:   pT1 N1 M0 p16-positive squamous cell carcinoma of the left tonsil    SUBJECTIVE:   Flaco Willis III is a 66 year old male with a PMH significant for a p16-positive pT1 N1 M0 squamous cell carcinoma of the left tonsil status post left tonsillectomy and ipsilateral neck dissection. For complete details on his pre-treatment history and work-up, please see my consultation note from 2022. Since that encounter, he underwent a left radical tonsillectomy and ipsilateral neck dissection by Dr. Valladares on 2/15/2022. Surgical pathology demonstrated a 2.2 mm focus within the inferior margin of the tonsil with a 7 mm focus of disease within the inferior tonsillar margin and a 2 mm focus of residual tumor in the resected inferior tonsillar specimen with a closest margin of 1.5 mm. He had metastatic involvement of a single level 2A lymph node measuring 2.6 cm with no extranodal extension. The remainder of the 18 dissected lymph nodes were negative for metastatic disease. He returns to clinic today to review pathology and discuss treatment options moving forward.    PHYSICAL EXAM:  Weight: 72.1 kg  BP: 167/101    General: Healthy-appearing 66-year-old gentleman seated comfortably in an examination chair in no acute distress  HEENT: NC/AT. EOMI. No rhinorrhea or epistaxis. Moist mucous membranes. Defect from left tonsillectomy. Surrounding mucosa is healthy in appearance with no nodularity, induration or masses.  Neck: Left neck incision is clean/dry/intact. No palpable cervical adenopathy bilaterally.  Pulmonary: No wheezing, stridor or respiratory distress  Skin: Normal color and turgor    LABS AND IMAGING:  No recent labs or imaging    IMPRESSION:   Mr. Willis is a 66  year old male with a pT1 N1 M0 p16-positive squamous cell carcinoma of the left tonsil s/p left radical tonsillectomy and ipsilateral lymph node dissection. Surgical pathology reveals a small primary tumor with a close albeit negative surgical margin along the inferior aspect of the specimen.    PLAN:   I had a long discussion with Mr. Willis and his wife regarding the surgical pathology findings from the resection of his early stage disease. While he did have metastatic involvement of a single ipsilateral level 2 node, he had no evidence of extranodal extension and the node itself was sub-3 cm in size. His primary tumor was likewise small and, aside from the initially positive inferior margin which was reresected with a final close, but negative margin, he has minimal risk factors associated with this tumor.     I spent the majority of our discussion regarding this latter point (primary tumor margin status) and its ramifications on disease recurrence rates. In short, he is likely at a low to low-intermediate risk of disease recurrence and adjuvant radiotherapy would not add significantly in terms of absolute benefit to his disease control. On that same token, adjuvant radiotherapy would add to his treatment-induced toxicity with some effects likely long-lasting after treatment. Given this risk/benefit ratio, I did not feel strongly that adjuvant radiotherapy is warranted in his case. I instead recommended continued close surveillance with Dr. Valladares and the ENT service to which Mr. Willis was agreeable. I am happy to see Mr. Willis back in clinic at any time should he have further questions/concerns however he will otherwise continue to follow-up with his ENT team for ongoing disease surveillance.    Alcides Malone MD/PhD    Department of Radiation Oncology  [facility]

## 2022-03-18 ENCOUNTER — PATIENT OUTREACH (OUTPATIENT)
Dept: OTOLARYNGOLOGY | Facility: CLINIC | Age: 67
End: 2022-03-18
Payer: COMMERCIAL

## 2022-03-25 ENCOUNTER — PATIENT OUTREACH (OUTPATIENT)
Dept: OTOLARYNGOLOGY | Facility: CLINIC | Age: 67
End: 2022-03-25
Payer: COMMERCIAL

## 2022-03-25 NOTE — TELEPHONE ENCOUNTER
Received a call from patient indicating that he has developed new symptoms that are causing him some concern.   He indicates that when he takes the first bite of his food he feels an intense pain in his left cheek and face. This pain improves with continuing to chew and subsequent bites but patient indicates this is very uncomfortable for him and he is requesting sooner appointment with Dr. Valladares.     Discussed with patient that this is likely something called first bite syndrome that can sometimes happen with the surgery that he had but although this is uncomfortable for him there are no serious concerns with this. It will likely improve with some time. Patient requested writer talk to Dr. Valladares. Writer sent message to Dr. Valladares who agreed that this sounds like first bite syndrome and he can try to help with some of the symptoms by licking the first bite. Patient verbalized understanding of this. Advised patient that he can continue with the follow up scheduled with Dr. Valladares on 4/4 and update writer with any further questions or concerns.       Lesa Carrillo, RN, BSN

## 2022-03-31 ENCOUNTER — PATIENT OUTREACH (OUTPATIENT)
Dept: OTOLARYNGOLOGY | Facility: CLINIC | Age: 67
End: 2022-03-31
Payer: COMMERCIAL

## 2022-03-31 NOTE — TELEPHONE ENCOUNTER
Patient called writer and indicates that below his ear he is having increased pain whenever he touches this area as well as when he eats. He feels that this is not improving and he is adamant that he needs to be seen in clinic prior to 4/4. Patient denies any additional symptoms including redness, swelling, drainage, or fevers. Patient feels that this is all related to his previous fungal infection that his PCP treated him for and would like Dr. Valladares to see him urgently.       Reviewed with Dr. Valladares and added patient to clinic on Friday 4/1 at 12:00pm. Patient verbalized understanding and was encouraged to call with further questions or concerns.       Lesa Carrillo, RN, BSN

## 2022-04-01 ENCOUNTER — OFFICE VISIT (OUTPATIENT)
Dept: OTOLARYNGOLOGY | Facility: CLINIC | Age: 67
End: 2022-04-01
Payer: COMMERCIAL

## 2022-04-01 VITALS — HEIGHT: 68 IN | BODY MASS INDEX: 24.86 KG/M2 | WEIGHT: 164 LBS | TEMPERATURE: 97.7 F

## 2022-04-01 DIAGNOSIS — C77.0 SECONDARY MALIGNANCY OF LYMPH NODES OF HEAD, FACE AND NECK (H): ICD-10-CM

## 2022-04-01 DIAGNOSIS — C10.9 MALIGNANT NEOPLASM OF OROPHARYNX (H): Primary | ICD-10-CM

## 2022-04-01 PROCEDURE — 99024 POSTOP FOLLOW-UP VISIT: CPT | Performed by: OTOLARYNGOLOGY

## 2022-04-01 RX ORDER — GABAPENTIN 100 MG/1
100 CAPSULE ORAL AT BEDTIME
Qty: 60 CAPSULE | Refills: 1 | Status: SHIPPED | OUTPATIENT
Start: 2022-04-01 | End: 2022-04-29

## 2022-04-01 ASSESSMENT — PAIN SCALES - GENERAL: PAINLEVEL: NO PAIN (0)

## 2022-04-01 NOTE — NURSING NOTE
"Chief Complaint   Patient presents with     RECHECK     1 month follow up       Temperature 97.7  F (36.5  C), temperature source Temporal, height 1.727 m (5' 8\"), weight 74.4 kg (164 lb).    Ketan Solo, EMT  "

## 2022-04-01 NOTE — PROGRESS NOTES
Dear Dr. Friedman:    I had the pleasure of seeing Flaco Willis III in follow-up today at the [facility].     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 He developed a left neck mass which was evaluated by his PCP and then referred to his local ENT. He had a CT neck performed on 11/16/2021 which showed a 2.5 x 2.0 cm neck mass. He had an U/S of the neck performed on 12/8/2021 which showed a 3.1 x 2.3 x 1.7 cm left neck mass.  He had an FNA of the neck mass on 12/8/2021 which showed p16+ SCC. He had a PET scan on 1/4/2022 which showed focal uptake in the left tonsil with no obvious CT correlate, 2 cm FDG avid left level IIA node, no distant disease. He saw medical oncology at [facility] and was recommended for radiation +/- chemotherapy, recommended against surgery due to the need for postoperative chemoradiation. He then transferred his care to the [facility]. He saw Dr Friedman on 1/19/2022 who discussed various treatment options with him. He met with Dr Malone in January 2022 to discuss radiation. He decided to proceed with surgery. He was taken to the OR on 2/15/2022 for left radical tonsillectomy and left neck dissection (levels II-IV). Intraoperatively inferior tonsil margin was positive for SCC. A re-resection was performed. Final pathology demonstrated a p16+ SCC in the tonsil of 2.2 mm focus in the inferior margin, a 7 mm focus of SCC, DOI 1 mm in the inferior tonsil margin, and 2 mm of residual SCC in the re-resected inferior tonsil specimen, with closest margin 1.5 mm. There was a 2.6 cm metastatic node in level IIA, no IMANI, 18 remaining nodes negative.      Interval history:  He comes in today for follow-up. He was last seen in March 2022. He comes in today with concerns for ongoing issues. He called in in March with symptoms consistent with first bite syndrome. His PCP did also recently treat him for a course of  thrush. He says that he can get some pain in the left upper neck when he starts to eat. He says he will start to hyperventilate. He says this occurs every time he eats. He has pain to the touch on the left parotid area, says that he has sensitivity to touch on the right side of the parotid baseline. He thinks the previous white spots have resolved. He says the thought of food will bring on the symptoms as well. He thinks it is getting worse. He says his taste is off. He is not taking anything medication right now. He stopped the anxiety and depression medications, stopped about 2 weeks after surgery. His voice is almost back to normal. His swallowing is almost normal and can eat anything. He has a small ache in his throat first thing in the morning. He is doing the massage to the neck. He is doing some of the shoulder exercises,       MEDICATIONS:     Current Outpatient Medications   Medication Sig Dispense Refill     acetaminophen (TYLENOL) 325 MG tablet 2 tablets (650 mg) by Per NG tube route every 6 hours as needed for other (For optimal non-opioid multimodal pain management to improve pain control.) 30 tablet 0     aspirin 81 MG tablet Take 81 mg by mouth       atorvastatin (LIPITOR) 20 MG tablet Take 20 mg by mouth daily       busPIRone (BUSPAR) 10 MG tablet Take 10 mg by mouth 2 times daily        chlorhexidine (PERIDEX) 0.12 % solution Swish and spit a small amount after every meal and before bed 473 mL 0     Cholecalciferol (VITAMIN D3) 1000 UNITS CAPS Take 1,000 Units by mouth        dextroamphetamine (DEXEDRINE SPANSULE) 15 MG 24 hr capsule Take 15 mg by mouth        escitalopram (LEXAPRO) 10 MG tablet Take 10 mg by mouth daily       finasteride (PROPECIA) 1 MG tablet Take 1 mg by mouth daily        LORazepam (ATIVAN) 2 MG/ML (HIGH CONC) oral solution 0.25-0.5 mLs (0.5-1 mg) by Oral or Feeding Tube route 2 times daily as needed for anxiety 7 mL 0     melatonin 3 MG tablet Take 1 tablet (3 mg) by mouth  nightly as needed for sleep 14 tablet 1     mineral oil-hydrophilic petrolatum (AQUAPHOR) external ointment Apply a small amount of ointment to the incision and drain sites three times per day and as needed for crusting 99 g 0     naloxone (NARCAN) 4 MG/0.1ML nasal spray Spray 1 spray (4 mg) into one nostril alternating nostrils once as needed for opioid reversal every 2-3 minutes until assistance arrives 0.1 mL 0     Omega-3 Fatty Acids (FISH OIL PO)        senna-docusate (SENOKOT-S/PERICOLACE) 8.6-50 MG tablet 1 tablet by Per NG tube route 2 times daily 28 tablet 0     tadalafil (CIALIS) 20 MG tablet Take 20 mg by mouth       VITAMIN E MTC PO          ALLERGIES:    Allergies   Allergen Reactions     Augmentin Rash       HABITS/SOCIAL HISTORY:   Smoked on weekends previously several years ago. No chewing tobacco. Drinks beer on weekends.   .   Works in [employer] - does a lot of on [employer] work.     Social History     Socioeconomic History     Marital status:      Spouse name: Not on file     Number of children: Not on file     Years of education: Not on file     Highest education level: Not on file   Occupational History     Not on file   Tobacco Use     Smoking status: Former Smoker     Types: Cigars     Start date: 1973     Quit date: 1990     Years since quittin.2     Smokeless tobacco: Never Used   Substance and Sexual Activity     Alcohol use: Yes     Drug use: No     Sexual activity: Yes     Partners: Female     Birth control/protection: Pull-out method   Other Topics Concern     Not on file   Social History Narrative     Not on file     Social Determinants of Health     Financial Resource Strain: Not on file   Food Insecurity: Not on file   Transportation Needs: Not on file   Physical Activity: Not on file   Stress: Not on file   Social Connections: Not on file   Intimate Partner Violence: Not on file   Housing Stability: Not on file       PAST MEDICAL HISTORY:   Past  "Medical History:   Diagnosis Date     ADD (attention deficit disorder)      Anxiety      Crohn's disease (H)      Hyperlipidemia      Hypertension      Liposarcoma of lower extremity (H)         PAST SURGICAL HISTORY:   Past Surgical History:   Procedure Laterality Date     DISSECTION RADICAL NECK MODIFIED Left 2/15/2022    Procedure: left modified radical neck dissection.;  Surgeon: Summer Valladares MD;  Location: [facility] OR     EXCISE SOFT TISSUE TUMOR THIGH  11/21/2013    Procedure: EXCISE SOFT TISSUE TUMOR THIGH;  Excision of Tumor Bed Left Medial Thigh;  Surgeon: Seymour Jesus MD;  Location: [facility] OR     HERNIA REPAIR       TONSILLECTOMY ADULT Left 2/15/2022    Procedure: left radical tonsillectomy, nasogastric tube placement;  Surgeon: Summer Valladares MD;  Location: [facility] OR     tumor removal of thigh[       vocal cord surgery[       WRIST SURGERY         FAMILY HISTORY:  No family history on file.    REVIEW OF SYSTEMS:  12 point ROS was negative other than the symptoms noted above in the HPI.  Patient Supplied Answers to Review of Systems  [facility] ROS 2/23/2022   Constitutional Problems with sleep   Ears, Nose, Throat Ear pain, Nasal congestion or drainage, Sore throat, Trouble swallowing, Hoarseness         PHYSICAL EXAMINATION:   Temp 97.7  F (36.5  C) (Temporal)   Ht 1.727 m (5' 8\")   Wt 74.4 kg (164 lb)   BMI 24.94 kg/m     Patient in NAD  Breathing comfortably on room air  Voice normal, communicates verbally  Left radical tonsillectomy defect healed appropriately, small amount of granulation tissue present along the junction of the posterior lateral tongue and tonsil defect  Tongue protrudes midline with movement bilaterally, slight pulling laterally of the tongue likely secondary to scarring  Neck incision is healed appropriately, no dehiscence, no infection, some scarring/fibrosis palpable      PROCEDURES:       RESULTS REVIEWED:       IMPRESSION AND PLAN:   Flaco Willis is a 66 year old man " with a p16+ T1N1 SCC of the left tonsil. He underwent surgical resection with left radical tonsillectomy and left neck dissection on 2/15/2022. He was recommended for no further treatment.    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 We discussed that this will likely improve with time. We discussed that we can try gabapentin for his symptoms.    He has concerns about his strength and shoulder.  A physical therapy referral was placed.  We discussed that once he has his PET scan we can consider a lymphedema referral for therapy for the neck if he desires.    He will need his 3 month post treatment PET scan on 5/10/2022.    I will see him back with his PET scan.    Thank you very much for the opportunity to participate in the care of your patient.      Summer Valladares MD  Otolaryngology- Head & Neck Surgery      This note was dictated with voice recognition software and then edited. Please excuse any unintentional errors.       A total of 25 minutes was spent with the patient on the date of service with the entire time spent in counseling of the patient.      CC:  Ashok Friedman MD  Department of Medical Oncology  [facility]      Alcides Malone MD  Department of Radiation Oncology  [facility]

## 2022-04-01 NOTE — PATIENT INSTRUCTIONS
Follow-up with Dr Valladares around 5/10/2022 with PET scan on same day      A PT referral was placed:  If you have not heard from the scheduling office within 2 business days, please call [phone] for [facility], [phone] for [facility] and [phone] for [facility].

## 2022-04-01 NOTE — LETTER
4/1/2022       RE: Flaco Willis III  [address] [place] [address]     Dear Colleague,    Thank you for referring your patient, Flaco Willis III, to the [facility] [place] at [facility]. Please see a copy of my visit note below.    Dear Dr. Friedman:    I had the pleasure of seeing Flaco Willis III in follow-up today at the [facility].     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 He developed a left neck mass which was evaluated by his PCP and then referred to his local ENT. He had a CT neck performed on 11/16/2021 which showed a 2.5 x 2.0 cm neck mass. He had an U/S of the neck performed on 12/8/2021 which showed a 3.1 x 2.3 x 1.7 cm left neck mass.  He had an FNA of the neck mass on 12/8/2021 which showed p16+ SCC. He had a PET scan on 1/4/2022 which showed focal uptake in the left tonsil with no obvious CT correlate, 2 cm FDG avid left level IIA node, no distant disease. He saw medical oncology at [facility] and was recommended for radiation +/- chemotherapy, recommended against surgery due to the need for postoperative chemoradiation. He then transferred his care to the [facility]. He saw Dr Friedman on 1/19/2022 who discussed various treatment options with him. He met with Dr Malone in January 2022 to discuss radiation. He decided to proceed with surgery. He was taken to the OR on 2/15/2022 for left radical tonsillectomy and left neck dissection (levels II-IV). Intraoperatively inferior tonsil margin was positive for SCC. A re-resection was performed. Final pathology demonstrated a p16+ SCC in the tonsil of 2.2 mm focus in the inferior margin, a 7 mm focus of SCC, DOI 1 mm in the inferior tonsil margin, and 2 mm of residual SCC in the re-resected inferior tonsil specimen, with closest margin 1.5 mm. There was a 2.6 cm  metastatic node in level IIA, no IMAIN, 18 remaining nodes negative.      Interval history:  He comes in today for follow-up. He was last seen in March 2022. He comes in today with concerns for ongoing issues. He called in in March with symptoms consistent with first bite syndrome. His PCP did also recently treat him for a course of thrush. He says that he can get some pain in the left upper neck when he starts to eat. He says he will start to hyperventilate. He says this occurs every time he eats. He has pain to the touch on the left parotid area, says that he has sensitivity to touch on the right side of the parotid baseline. He thinks the previous white spots have resolved. He says the thought of food will bring on the symptoms as well. He thinks it is getting worse. He says his taste is off. He is not taking anything medication right now. He stopped the anxiety and depression medications, stopped about 2 weeks after surgery. His voice is almost back to normal. His swallowing is almost normal and can eat anything. He has a small ache in his throat first thing in the morning. He is doing the massage to the neck. He is doing some of the shoulder exercises,       MEDICATIONS:     Current Outpatient Medications   Medication Sig Dispense Refill     acetaminophen (TYLENOL) 325 MG tablet 2 tablets (650 mg) by Per NG tube route every 6 hours as needed for other (For optimal non-opioid multimodal pain management to improve pain control.) 30 tablet 0     aspirin 81 MG tablet Take 81 mg by mouth       atorvastatin (LIPITOR) 20 MG tablet Take 20 mg by mouth daily       busPIRone (BUSPAR) 10 MG tablet Take 10 mg by mouth 2 times daily        chlorhexidine (PERIDEX) 0.12 % solution Swish and spit a small amount after every meal and before bed 473 mL 0     Cholecalciferol (VITAMIN D3) 1000 UNITS CAPS Take 1,000 Units by mouth        dextroamphetamine (DEXEDRINE SPANSULE) 15 MG 24 hr capsule Take 15 mg by mouth        escitalopram  (LEXAPRO) 10 MG tablet Take 10 mg by mouth daily       finasteride (PROPECIA) 1 MG tablet Take 1 mg by mouth daily        LORazepam (ATIVAN) 2 MG/ML (HIGH CONC) oral solution 0.25-0.5 mLs (0.5-1 mg) by Oral or Feeding Tube route 2 times daily as needed for anxiety 7 mL 0     melatonin 3 MG tablet Take 1 tablet (3 mg) by mouth nightly as needed for sleep 14 tablet 1     mineral oil-hydrophilic petrolatum (AQUAPHOR) external ointment Apply a small amount of ointment to the incision and drain sites three times per day and as needed for crusting 99 g 0     naloxone (NARCAN) 4 MG/0.1ML nasal spray Spray 1 spray (4 mg) into one nostril alternating nostrils once as needed for opioid reversal every 2-3 minutes until assistance arrives 0.1 mL 0     Omega-3 Fatty Acids (FISH OIL PO)        senna-docusate (SENOKOT-S/PERICOLACE) 8.6-50 MG tablet 1 tablet by Per NG tube route 2 times daily 28 tablet 0     tadalafil (CIALIS) 20 MG tablet Take 20 mg by mouth       VITAMIN E MTC PO          ALLERGIES:    Allergies   Allergen Reactions     Augmentin Rash       HABITS/SOCIAL HISTORY:   Smoked on weekends previously several years ago. No chewing tobacco. Drinks beer on weekends.   .   Works in [employer] - does a lot of on [employer] work.     Social History     Socioeconomic History     Marital status:      Spouse name: Not on file     Number of children: Not on file     Years of education: Not on file     Highest education level: Not on file   Occupational History     Not on file   Tobacco Use     Smoking status: Former Smoker     Types: Cigars     Start date: 1973     Quit date: 1990     Years since quittin.2     Smokeless tobacco: Never Used   Substance and Sexual Activity     Alcohol use: Yes     Drug use: No     Sexual activity: Yes     Partners: Female     Birth control/protection: Pull-out method   Other Topics Concern     Not on file   Social History Narrative     Not on file     Social  "Determinants of Health     Financial Resource Strain: Not on file   Food Insecurity: Not on file   Transportation Needs: Not on file   Physical Activity: Not on file   Stress: Not on file   Social Connections: Not on file   Intimate Partner Violence: Not on file   Housing Stability: Not on file       PAST MEDICAL HISTORY:   Past Medical History:   Diagnosis Date     ADD (attention deficit disorder)      Anxiety      Crohn's disease (H)      Hyperlipidemia      Hypertension      Liposarcoma of lower extremity (H)         PAST SURGICAL HISTORY:   Past Surgical History:   Procedure Laterality Date     DISSECTION RADICAL NECK MODIFIED Left 2/15/2022    Procedure: left modified radical neck dissection.;  Surgeon: Summer Valladares MD;  Location: [facility] OR     EXCISE SOFT TISSUE TUMOR THIGH  11/21/2013    Procedure: EXCISE SOFT TISSUE TUMOR THIGH;  Excision of Tumor Bed Left Medial Thigh;  Surgeon: Seymour Jesus MD;  Location: [facility] OR     HERNIA REPAIR       TONSILLECTOMY ADULT Left 2/15/2022    Procedure: left radical tonsillectomy, nasogastric tube placement;  Surgeon: Summer Valladares MD;  Location: [facility] OR     tumor removal of thigh[       vocal cord surgery[       WRIST SURGERY         FAMILY HISTORY:  No family history on file.    REVIEW OF SYSTEMS:  12 point ROS was negative other than the symptoms noted above in the HPI.  Patient Supplied Answers to Review of Systems  [facility] ROS 2/23/2022   Constitutional Problems with sleep   Ears, Nose, Throat Ear pain, Nasal congestion or drainage, Sore throat, Trouble swallowing, Hoarseness         PHYSICAL EXAMINATION:   Temp 97.7  F (36.5  C) (Temporal)   Ht 1.727 m (5' 8\")   Wt 74.4 kg (164 lb)   BMI 24.94 kg/m     Patient in NAD  Breathing comfortably on room air  Voice normal, communicates verbally  Left radical tonsillectomy defect healed appropriately, small amount of granulation tissue present along the junction of the posterior lateral tongue and tonsil " defect  Tongue protrudes midline with movement bilaterally, slight pulling laterally of the tongue likely secondary to scarring  Neck incision is healed appropriately, no dehiscence, no infection, some scarring/fibrosis palpable      PROCEDURES:       RESULTS REVIEWED:       IMPRESSION AND PLAN:   Flaco Willis is a 66 year old man with a p16+ T1N1 SCC of the left tonsil. He underwent surgical resection with left radical tonsillectomy and left neck dissection on 2/15/2022. He was recommended for no further treatment.    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 We discussed that this will likely improve with time. We discussed that we can try gabapentin for his symptoms.    He has concerns about his strength and shoulder.  A physical therapy referral was placed.  We discussed that once he has his PET scan we can consider a lymphedema referral for therapy for the neck if he desires.    He will need his 3 month post treatment PET scan on 5/10/2022.    I will see him back with his PET scan.    Thank you very much for the opportunity to participate in the care of your patient.      Summer Valladares MD  Otolaryngology- Head & Neck Surgery      This note was dictated with voice recognition software and then edited. Please excuse any unintentional errors.       A total of 25 minutes was spent with the patient on the date of service with the entire time spent in counseling of the patient.    CC:  Ashok Friedman MD  Department of Medical Oncology  [facility]    Alcides Malone MD  Department of Radiation Oncology  [facility]

## 2022-04-05 ENCOUNTER — HOSPITAL ENCOUNTER (OUTPATIENT)
Dept: PHYSICAL THERAPY | Facility: CLINIC | Age: 67
Discharge: HOME | End: 2022-04-05
Attending: OTOLARYNGOLOGY
Payer: COMMERCIAL

## 2022-04-05 DIAGNOSIS — M25.612 DECREASED SHOULDER MOBILITY, LEFT: Primary | ICD-10-CM

## 2022-04-05 DIAGNOSIS — C77.0 SECONDARY MALIGNANCY OF LYMPH NODES OF HEAD, FACE AND NECK (H): ICD-10-CM

## 2022-04-05 PROCEDURE — 97161 PT EVAL LOW COMPLEX 20 MIN: CPT | Mod: GP

## 2022-04-05 PROCEDURE — 97110 THERAPEUTIC EXERCISES: CPT | Mod: GP

## 2022-04-05 NOTE — PROGRESS NOTES
"   04/05/22 1731   FACIT-Fatigue Scale (Version 4) Copyright 1978, 1997 by Zeeshan Ferguson, PhD   Signing Clinician's Name/Credentials Chelita Del Rio, PT, DPT   Below is a list of statements that other people with your illness have said are important. Please [place] or tip one number per line to indicate your response as it applies to the past 7 days.   I feel fatigued 1 - A little bit   I feel weak all over 1 - A little bit   I feel listless (\"washed out\") 0 - Not at all   I feel tired 1 - A little bit   I have trouble starting things because I am tired 0 - Not at all   I have trouble finishing things because I am tired 0 - Not at all   I have energy 3 - Quite a bit   I am able to do my usual activities 3 - Quite a bit   I need to sleep during the day 1 - A little bit   I am too tired to eat 0 - Not at all   I need help doing my usual activities 0 - Not at all   I am frustrated by being too tired to do the things I want to do 4 - Very much   I have to limit my social activity because I am tired  1 - A little bit   FACIT-Fatigue Subscale Scoring   HI7 Item calculation 3   HI12 Item Calculation 3   An1 Item Calculation 4   An2 Item Calculation 3   An3 Item Calculation 4   An4 Item Calculation 4   An5 Item Calculation 3   An7 Item Calculation 3   An8 Item Calculation 3   An12 Item Calculation 4   An14 Item Calculation 4   An15 Item Calculation 0   An16 Item Calculation 3   Total Item Calculations Sum 41   Item Calculated Sum multiplied by 13 533   FACIT Fatigue Subscale (score out of 52). The higher the score, the better the QOL. 41     "

## 2022-04-06 NOTE — PROGRESS NOTES
[facility]                                                                                   OUTPATIENT PHYSICAL THERAPY FUNCTIONAL EVALUATION  PLAN OF TREATMENT FOR OUTPATIENT REHABILITATION  (COMPLETE FOR INITIAL CLAIMS ONLY)  Patient's Last Name, First Name, M.I.  YOB: 1955  Flaco Willis        Provider's Name   [facility]   Medical Record No.  [id number]     Start of Care Date:  04/05/22   Onset Date:  02/15/22   Type:     _X__PT   ____OT  ____SLP Medical Diagnosis:   Secondary malignancy of lymph nodes of head, face and neck (H)     PT Diagnosis:  shoulder pain with mobility impairment Visits from SOC:  1                              __________________________________________________________________________________  Plan of Treatment/Functional Goals:  ROM, strengthening, stretching, manual therapy           GOALS  Shoulder AROM  Patient to display >155 degrees shoulder flexion bilaterally pain free in order to reach into cabinets without being limited by pain.   05/31/22    Carrying strength  Patient to be able to walk with 30 pound of weight for 150 feet without reports of pain to be able to carry wood to/from home.   05/31/22    HEP  Patient to independently complete regular exercise program with correct mechanics in order to safely manage impairments upon discharge from skilled therapy.  05/31/22              Therapy Frequency:  1 time/week (decreased frequency to every other week as indicated)   Predicted Duration of Therapy Intervention:  4-6 visits over 8 weeks    Chelita Del Rio, PT                                    I CERTIFY THE NEED FOR THESE SERVICES FURNISHED UNDER        THIS PLAN OF TREATMENT AND WHILE UNDER MY CARE     (Physician co-signature of this document indicates review and certification of the therapy plan).                 Certification Date From:  04/05/22   Certification Date To:  05/31/22    Referring Provider:       Initial Assessment  See Epic Evaluation- Start of Care Date: 04/05/22

## 2022-04-06 NOTE — PROGRESS NOTES
04/05/22 0903   Quick Adds   Quick Adds Certification   Type of Visit Initial OP PT Evaluation   General Information   Start of Care Date 04/05/22   Referring Physician Dr. Valladares   Orders Evaluate and Treat as Indicated   Additional Orders SLP   Order Date 04/01/22   Medical Diagnosis  Secondary malignancy of lymph nodes of head, face and neck (H)   Onset of illness/injury or Date of Surgery 02/15/22   Surgical/Medical history reviewed Yes   Pertinent history of current problem (include personal factors and/or comorbidities that impact the POC) Patient referred to OP PT for assessment of shoulder and neck ROM and strength. 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 He underwent surgical resection with left radical tonsillectomy and left neck dissection on 2/15/2022. He was recommended for no further treatment. Current restrictions per patient: no lifting over 20 pounds. Patient reports recently completing UE strengthening exercises and lifting wood into home and now is experiencing B shoulder pain. Described as achy but with significant pinpoint pain location in L upper trap. Also notes B shoulder stiffness L>R. No pain at rest. PMH: ADD (attention deficit disorder), anxiety, Chron's, HLD, HTN, liposarcoma of LE   Prior level of function comment no history of shoulder pain   Patient role/Employment history Employed  (owns business, also [employer])   Home/Community Accessibility Comments no current home access concerns   Patient/Family Goals Statement understand what is going, suggestions on what to do help with it   Fall Risk Screen   Fall screen completed by PT   Have you fallen 2 or more times in the past year? No   Have you fallen and had an injury in the past year? No   Is patient a fall risk? No   Abuse Screen (yes response referral indicated)   Feels Unsafe at Home or Work/School no   Feels Threatened by Someone no   Does Anyone Try to Keep You From Having Contact with Others or Doing  Things Outside Your Home? no   Physical Signs of Abuse Present no   Pain   Patient currently in pain No   Pain comments see HPI for nature of pain   Cognitive Status Examination   Cognitive Comment alert, follow multi-step commands   Posture   Posture Comments Protracted shoulders and anteriorly rolled scapula   Palpation   Palpation significant palpable mass in L upper trap, tender to palpation, just superior to spine of scapular   Range of Motion (ROM)   ROM Comment Cervical AROM all WNL, Shoulder flexion AROM L 134 degrees, R 142 degrees, shoulder abduction AROM decreased on L compared to R, not measured. Shoulder flexion and abduction AROM significant for excessive scapular elevation on L UE compared to L   Strength   Strength Comments UE MMT shoulder flexion, shoulder abduction, shoulder IR, elbow flexion, shoulder elevation B 5/5 and painfree, shoulder ER B 4+/5   Modality Interventions   Planned Modality Interventions Comments Per therapist discretions   Planned Therapy Interventions   Planned Therapy Interventions ROM;strengthening;stretching;manual therapy   Clinical Impression   Criteria for Skilled Therapeutic Interventions Met yes, treatment indicated   PT Diagnosis shoulder pain with mobility impairment   Influenced by the following impairments decreased UE ROM with myofascial tissue restrictions s/p neck surgery, motot planning impairments with excessive L scapula elevation, postural impairment with anticipated weakness of postural stability muscles   Functional limitations due to impairments lifting, carrrying objects, UE ADLs   Clinical Presentation Stable/Uncomplicated   Clinical Presentation Rationale no active cancer treatment   Clinical Decision Making (Complexity) Low complexity   Therapy Frequency 1 time/week  (decreased frequency to every other week as indicated)   Predicted Duration of Therapy Intervention (days/wks) 4-6 visits over 8 weeks   Risk & Benefits of therapy have been explained Yes    Patient, Family & other staff in agreement with plan of care Yes   Clinical Impression Comments Patient would benefit from skilled therapy to address above impairments and progress towards PLOF without being limited by shoulder symptoms.    Goal 1   Goal Identifier Shoulder AROM   Goal Description Patient to display >155 degrees shoulder flexion bilaterally pain free in order to reach into cabinets without being limited by pain.    Goal Progress baseline: Shoulder flexion AROM L 134 degrees, R 142 degrees   Target Date 05/31/22   Goal 2   Goal Identifier Carrying strength   Goal Description Patient to be able to walk with 30 pound of weight for 150 feet without reports of pain to be able to carry wood to/from home.    Goal Progress baseline: excerbation of pain with <20 pounds of weight per current restrictions   Target Date 05/31/22   Goal 3   Goal Identifier HEP   Goal Description Patient to independently complete regular exercise program with correct mechanics in order to safely manage impairments upon discharge from skilled therapy.   Goal Progress baseline: seeking exercises to complete   Target Date 05/31/22   Total Evaluation Time   PT Eval, Low Complexity Minutes (10807) 45   Therapy Certification   Certification date from 04/05/22   Certification date to 05/31/22   Medical Diagnosis  Secondary malignancy of lymph nodes of head, face and neck (H)   Certification I certify the need for these services furnished under this plan of treatment and while under my care.  (Physician co-signature of this document indicates review and certification of the therapy plan).

## 2022-04-20 ENCOUNTER — HOSPITAL ENCOUNTER (OUTPATIENT)
Dept: PHYSICAL THERAPY | Facility: CLINIC | Age: 67
Discharge: HOME | End: 2022-04-20
Payer: COMMERCIAL

## 2022-04-20 DIAGNOSIS — C77.0 SECONDARY MALIGNANCY OF LYMPH NODES OF HEAD, FACE AND NECK (H): Primary | ICD-10-CM

## 2022-04-20 DIAGNOSIS — M25.612 DECREASED SHOULDER MOBILITY, LEFT: ICD-10-CM

## 2022-04-20 PROCEDURE — 97110 THERAPEUTIC EXERCISES: CPT | Mod: GP | Performed by: PHYSICAL THERAPIST

## 2022-04-21 ENCOUNTER — PATIENT OUTREACH (OUTPATIENT)
Dept: OTOLARYNGOLOGY | Facility: CLINIC | Age: 67
End: 2022-04-21
Payer: COMMERCIAL

## 2022-04-21 NOTE — TELEPHONE ENCOUNTER
Received a call from patient with concerns after seeing physical therapist. Returned call and left message for patient to return call to further discuss.       Lesa Carrillo RN, BSN

## 2022-04-22 NOTE — TELEPHONE ENCOUNTER
Patient returned call and discuss the following information from PT and Dr. Valladares:       ----- Message -----   From: Summer Valladares MD   Sent: 4/20/2022   9:03 PM CDT   To: Chelita Del Rio PT, Erwin Cruz PT, *   Subject: RE: PT Update                                     Daniele Hood,   Thanks for seeing the patient and the message. I preserved the spinal accessory nerve during the surgery and it was working at the end of the procedure. He may have some temporary praxis but the nerve is intact. A SCC of the oropharynx of his staging would not be expected to go to the left scapular region. He has a PET scan scheduled for next month. A PET scan done sooner would show uptake in the surgical bed and would not be helpful this early out from surgery. It would be unlikely for his sarcoma to recur at this time and in that location. I have added my nurse to this message so she can connect with the patient.   Thanks.       ----- Message -----   From: Erwin Cruz PT   Sent: 4/20/2022  12:21 PM CDT   To: Chelita Del Rio PT, Summer Valladares MD   Subject: PT Update                                         Dr. Valladares,     We have seen Mr. Willis x 2 visits thus far for PT.  Today, I assessed his left shoulder and noted a palpable mass just superior to his L scapular spine.  This site is tender to palpation and he does not recall it being present months ago.  In addition, he is demonstrating evidence of left-sided upper trapezius weakness and atrophy, contributing to significant scapular dysfunction. Given his history of recent neck surgery as well as previous SCC and sarcoma, I am concerned for possible nerve damage/irritation as well as unknown mass etiology.  Would you mind connecting with him and/or considering further imaging?     Thank you,     Erwin Cruz PT, DPT   [phone]              Reviewed with patient and he is in agreement to stick to follow-up plan and imaging plan. He will continue to  monitor and update writer with any concerns. Patient indicates that he is now taking 300mg at night of gabapentin with little benefit. Discussed with patient that he could increase to 300mg 2 times daily if he wishes. Patient will try this and update writer if he does not see improvement with his first bite. Patient encouraged to call with any further questions or concerns.     Lesa Carrillo, RN, BSN

## 2022-04-29 DIAGNOSIS — C10.9 MALIGNANT NEOPLASM OF OROPHARYNX (H): ICD-10-CM

## 2022-04-29 RX ORDER — GABAPENTIN 100 MG/1
300 CAPSULE ORAL 2 TIMES DAILY
Qty: 180 CAPSULE | Refills: 0 | Status: SHIPPED | OUTPATIENT
Start: 2022-04-29 | End: 2022-04-29

## 2022-04-29 RX ORDER — GABAPENTIN 100 MG/1
300 CAPSULE ORAL 2 TIMES DAILY
Qty: 180 CAPSULE | Refills: 0 | Status: SHIPPED | OUTPATIENT
Start: 2022-04-29 | End: 2022-06-02

## 2022-05-11 ENCOUNTER — HOSPITAL ENCOUNTER (OUTPATIENT)
Dept: PHYSICAL THERAPY | Facility: CLINIC | Age: 67
Discharge: HOME | End: 2022-05-11
Payer: COMMERCIAL

## 2022-05-11 DIAGNOSIS — C77.0 SECONDARY MALIGNANCY OF LYMPH NODES OF HEAD, FACE AND NECK (H): Primary | ICD-10-CM

## 2022-05-11 DIAGNOSIS — R29.898 WEAKNESS OF SHOULDER: ICD-10-CM

## 2022-05-11 PROCEDURE — 97110 THERAPEUTIC EXERCISES: CPT | Mod: GP

## 2022-05-16 NOTE — PROGRESS NOTES
Dear Dr. Friedman:    I had the pleasure of seeing Flaco Willis III in follow-up today at the [facility].     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 He developed a left neck mass which was evaluated by his PCP and then referred to his local ENT. He had a CT neck performed on 11/16/2021 which showed a 2.5 x 2.0 cm neck mass. He had an U/S of the neck performed on 12/8/2021 which showed a 3.1 x 2.3 x 1.7 cm left neck mass.  He had an FNA of the neck mass on 12/8/2021 which showed p16+ SCC. He had a PET scan on 1/4/2022 which showed focal uptake in the left tonsil with no obvious CT correlate, 2 cm FDG avid left level IIA node, no distant disease. He saw medical oncology at [facility] and was recommended for radiation +/- chemotherapy, recommended against surgery due to the need for postoperative chemoradiation. He then transferred his care to the [facility]. He saw Dr Friedman on 1/19/2022 who discussed various treatment options with him. He met with Dr Malone in January 2022 to discuss radiation. He decided to proceed with surgery. He was taken to the OR on 2/15/2022 for left radical tonsillectomy and left neck dissection (levels II-IV). Intraoperatively inferior tonsil margin was positive for SCC. A re-resection was performed. Final pathology demonstrated a p16+ SCC in the tonsil of 2.2 mm focus in the inferior margin, a 7 mm focus of SCC, DOI 1 mm in the inferior tonsil margin, and 2 mm of residual SCC in the re-resected inferior tonsil specimen, with closest margin 1.5 mm. There was a 2.6 cm metastatic node in level IIA, no IMANI, 18 remaining nodes negative.      Interval history:  He comes in today for follow-up. He was last seen in April 2022. 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 He was started on gabapentin. He has been seeing PT for his shoulder. He comes in today with his PET scan. He is still  having issues with the first bite syndrome. He is taking the gabapentin, tried increasing his dose. He is seeing the PT for the shoulder. He is concerned about a mass in his shoulder. He saw his PCP who got an X-ray to ensure no sarcoma. He has no issues with his swallowing. He feels like his throat is smaller than before. He says the first bite is more like every bite. He says the symptoms are less with thinking about food than before. He mostly chews on the right side now. He notices the first bite will fatigue with eating. He can eat most anything. There are a few foods that cause less of the first bite. He feels like his numbness of his neck is improving. He feels like his clavicular area is more hollow.         MEDICATIONS:     Current Outpatient Medications   Medication Sig Dispense Refill     acetaminophen (TYLENOL) 325 MG tablet 2 tablets (650 mg) by Per NG tube route every 6 hours as needed for other (For optimal non-opioid multimodal pain management to improve pain control.) 30 tablet 0     aspirin 81 MG tablet Take 81 mg by mouth       atorvastatin (LIPITOR) 20 MG tablet Take 20 mg by mouth daily       busPIRone (BUSPAR) 10 MG tablet Take 10 mg by mouth 2 times daily        chlorhexidine (PERIDEX) 0.12 % solution Swish and spit a small amount after every meal and before bed 473 mL 0     Cholecalciferol (VITAMIN D3) 1000 UNITS CAPS Take 1,000 Units by mouth        dextroamphetamine (DEXEDRINE SPANSULE) 15 MG 24 hr capsule Take 15 mg by mouth        escitalopram (LEXAPRO) 10 MG tablet Take 10 mg by mouth daily       finasteride (PROPECIA) 1 MG tablet Take 1 mg by mouth daily        gabapentin (NEURONTIN) 100 MG capsule Take 3 capsules (300 mg) by mouth 2 times daily 180 capsule 0     LORazepam (ATIVAN) 2 MG/ML (HIGH CONC) oral solution 0.25-0.5 mLs (0.5-1 mg) by Oral or Feeding Tube route 2 times daily as needed for anxiety 7 mL 0     melatonin 3 MG tablet Take 1 tablet (3 mg) by mouth nightly as needed for  sleep 14 tablet 1     mineral oil-hydrophilic petrolatum (AQUAPHOR) external ointment Apply a small amount of ointment to the incision and drain sites three times per day and as needed for crusting 99 g 0     naloxone (NARCAN) 4 MG/0.1ML nasal spray Spray 1 spray (4 mg) into one nostril alternating nostrils once as needed for opioid reversal every 2-3 minutes until assistance arrives 0.1 mL 0     Omega-3 Fatty Acids (FISH OIL PO)        tadalafil (CIALIS) 20 MG tablet Take 20 mg by mouth       VITAMIN E MTC PO        senna-docusate (SENOKOT-S/PERICOLACE) 8.6-50 MG tablet 1 tablet by Per NG tube route 2 times daily (Patient not taking: Reported on 2022) 28 tablet 0       ALLERGIES:    Allergies   Allergen Reactions     Augmentin Rash       HABITS/SOCIAL HISTORY:   Smoked on weekends previously several years ago. No chewing tobacco. Drinks beer on weekends.   .   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     Social History     Socioeconomic History     Marital status:      Spouse name: Not on file     Number of children: Not on file     Years of education: Not on file     Highest education level: Not on file   Occupational History     Not on file   Tobacco Use     Smoking status: Former Smoker     Types: Cigars     Start date: 1973     Quit date: 1990     Years since quittin.3     Smokeless tobacco: Never Used   Substance and Sexual Activity     Alcohol use: Yes     Drug use: No     Sexual activity: Yes     Partners: Female     Birth control/protection: Pull-out method   Other Topics Concern     Not on file   Social History Narrative     Not on file     Social Determinants of Health     Financial Resource Strain: Not on file   Food Insecurity: Not on file   Transportation Needs: Not on file   Physical Activity: Not on file   Stress: Not on file   Social Connections: Not on file   Intimate Partner Violence: Not on file   Housing Stability: Not on file       PAST  "MEDICAL HISTORY:   Past Medical History:   Diagnosis Date     ADD (attention deficit disorder)      Anxiety      Crohn's disease (H)      Hyperlipidemia      Hypertension      Liposarcoma of lower extremity (H)         PAST SURGICAL HISTORY:   Past Surgical History:   Procedure Laterality Date     DISSECTION RADICAL NECK MODIFIED Left 2/15/2022    Procedure: left modified radical neck dissection.;  Surgeon: Summer Valladares MD;  Location: [facility] OR     EXCISE SOFT TISSUE TUMOR THIGH  11/21/2013    Procedure: EXCISE SOFT TISSUE TUMOR THIGH;  Excision of Tumor Bed Left Medial Thigh;  Surgeon: Seymour Jesus MD;  Location: [facility] OR     HERNIA REPAIR       TONSILLECTOMY ADULT Left 2/15/2022    Procedure: left radical tonsillectomy, nasogastric tube placement;  Surgeon: Summer Valladares MD;  Location: [facility] OR     tumor removal of thigh[       vocal cord surgery[       WRIST SURGERY         FAMILY HISTORY:  No family history on file.    REVIEW OF SYSTEMS:  12 point ROS was negative other than the symptoms noted above in the HPI.  Patient Supplied Answers to Review of Systems  [facility] ROS 2/23/2022   Constitutional: Problems with sleep   Ears, Nose, Throat: Ear pain, Nasal congestion or drainage, Sore throat, Trouble swallowing, Hoarseness         PHYSICAL EXAMINATION:   BP (!) 204/103 (BP Location: Left arm, Patient Position: Sitting, Cuff Size: Adult Regular)   Pulse 106   Temp 98.6  F (37  C) (Temporal)   Ht 1.727 m (5' 8\")   Wt 75.3 kg (166 lb)   SpO2 97%   BMI 25.24 kg/m     Appearance:   normal; NAD, age-appropriate appearance, well-developed, normal habitus   Communication:   normal; communicates verbally, normal voice quality   Head/Face:   inspection -  Normal; no scars or visible lesions   Salivary glands -  Normal size, no tenderness, swelling, or palpable masses   Facial strength -  Normal and symmetric    Skin:  normal, no rash   Ears:  auricle (AD) -  normal  auricle (AS) -  normal  Normal clinical " speech reception   Nose:  Ext. inspection -  Normal  Internal Inspection -  Normal mucosa, septum, and turbinates   Nasopharynx:  normal mucosa, no masses   Oral Cavity:  lips -  Normal mucosa, oral competence, and stoma size   Age-appropriate dentition, healthy gingival mucosa   Hard palate, buccal, floor of mouth mucosa normal   Tongue - normal movement, no lesions, posteriorly the tongue is tethered to the RMT/tonsil defect on the left, no palpable masses, some scar tissue posteriorly on palpation, some tenderness   Oropharynx:  mucosa -  Normal, no lesions  soft palate -  Normal, no lesions, no asymmetry, normal elevation  tonsils -  S/p left radical tonsillectomy with no masses palpable or on visual inspection, no mucosal lesions   Hypopharynx:  Normal pyriform sinus and pharyngeal wall mucosa   No pooled secretions    Larynx:  Epiglottis, AE folds, false vocal cords, true vocal cords, arytenoids normal in appearance, bilaterally mobile cords    Neck: Well healed left neck incision  No palpable masses  Some tenderness to palpation in level IIB  Decreased ROM of left shoulder   Lymphatic:  no abnormal nodes   Cardiovascular:  warm, pink, well-perfused extremities without swelling, tenderness, or edema   Respiratory:  Normal respiratory effort, no stridor   Neuro/Psych.:  mood/affect -  normal  mental status -  normal       PROCEDURES:   Flexible fiberoptic laryngoscopy: Scope exam was indicated due to tonsil cancer. Verbal consent was obtained. The nasal cavity was prepped with an aerosolized solution of topical anesthetic and vasoconstrictive agent. The scope was passed through the anterior nasal cavity and advanced. Inspection of the nasopharynx revealed no gross abnormality. The base of tongue and vallecula are normal with the exception of the expected postoperative changes to the left base of tongue/tonsil without evidence of residual cancer. The epiglottis, AE folds, false cords, true cords, arytenoids are  normal.  Inspection of the larynx revealed bilaterally mobile vocal cords. Pyriform sinuses are symmetric. The airway is patent. Procedure tolerated well with no immediate complications noted.    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 He underwent surgical resection with left radical tonsillectomy and left neck dissection on 2/15/2022. He was recommended for no further treatment.    He has no evidence of recurrent disease.    He has first bite syndrome. He is on gabapentin. We discussed that if he stops the gabapentin he will have to wean off. We can refer him to Dr Shaquille Block for consideration of botox injections.    He is seeing PT for his shoulder. I discussed with him that his area of concern along the left shoulder is not consistent on exam or imaging with recurrent sarcoma.     He had his post treatment PET scan today. There is no evidence of recurrence.  We will repeat imaging in 6 months.    I will see him back in 3 months.    Thank you very much for the opportunity to participate in the care of your patient.      Summer Valladares MD  Otolaryngology- Head & Neck Surgery      This note was dictated with voice recognition software and then edited. Please excuse any unintentional errors.       I spent a total of 36 minutes on patient visit on date of service including patient visit (25 minutes) and charting activities, not including procedures.        CC:  Ashok Friedman MD  Department of Medical Oncology  [facility]      Alcides Malone MD  Department of Radiation Oncology  [facility]

## 2022-05-18 ENCOUNTER — HOSPITAL ENCOUNTER (OUTPATIENT)
Dept: PET IMAGING | Facility: CLINIC | Age: 67
Discharge: HOME | End: 2022-05-18
Attending: OTOLARYNGOLOGY
Payer: COMMERCIAL

## 2022-05-18 ENCOUNTER — HOSPITAL ENCOUNTER (OUTPATIENT)
Dept: PET IMAGING | Facility: CLINIC | Age: 67
Setting detail: NUCLEAR MEDICINE
Discharge: HOME | End: 2022-05-18
Attending: OTOLARYNGOLOGY
Payer: COMMERCIAL

## 2022-05-18 ENCOUNTER — OFFICE VISIT (OUTPATIENT)
Dept: OTOLARYNGOLOGY | Facility: CLINIC | Age: 67
End: 2022-05-18
Payer: COMMERCIAL

## 2022-05-18 VITALS
WEIGHT: 166 LBS | SYSTOLIC BLOOD PRESSURE: 204 MMHG | OXYGEN SATURATION: 97 % | HEART RATE: 106 BPM | TEMPERATURE: 98.6 F | DIASTOLIC BLOOD PRESSURE: 103 MMHG | HEIGHT: 68 IN | BODY MASS INDEX: 25.16 KG/M2

## 2022-05-18 DIAGNOSIS — C77.0 SECONDARY MALIGNANCY OF LYMPH NODES OF HEAD, FACE AND NECK (H): ICD-10-CM

## 2022-05-18 DIAGNOSIS — C10.9 MALIGNANT NEOPLASM OF OROPHARYNX (H): ICD-10-CM

## 2022-05-18 DIAGNOSIS — C10.9 MALIGNANT NEOPLASM OF OROPHARYNX (H): Primary | ICD-10-CM

## 2022-05-18 LAB
CREAT BLD-MCNC: 0.8 MG/DL (ref 0.7–1.3)
GFR SERPL CREATININE-BSD FRML MDRD: >60 ML/MIN/1.73M2

## 2022-05-18 PROCEDURE — 78816 PET IMAGE W/CT FULL BODY: CPT | Mod: 26 | Performed by: RADIOLOGY

## 2022-05-18 PROCEDURE — 343N000001 HC RX 343: Performed by: OTOLARYNGOLOGY

## 2022-05-18 PROCEDURE — 70491 CT SOFT TISSUE NECK W/DYE: CPT | Mod: 26 | Performed by: RADIOLOGY

## 2022-05-18 PROCEDURE — 71260 CT THORAX DX C+: CPT | Mod: 26 | Performed by: RADIOLOGY

## 2022-05-18 PROCEDURE — 74177 CT ABD & PELVIS W/CONTRAST: CPT

## 2022-05-18 PROCEDURE — 74177 CT ABD & PELVIS W/CONTRAST: CPT | Mod: 26 | Performed by: RADIOLOGY

## 2022-05-18 PROCEDURE — 31575 DIAGNOSTIC LARYNGOSCOPY: CPT | Performed by: OTOLARYNGOLOGY

## 2022-05-18 PROCEDURE — 70491 CT SOFT TISSUE NECK W/DYE: CPT

## 2022-05-18 PROCEDURE — 250N000011 HC RX IP 250 OP 636: Performed by: OTOLARYNGOLOGY

## 2022-05-18 PROCEDURE — 99214 OFFICE O/P EST MOD 30 MIN: CPT | Mod: 25 | Performed by: OTOLARYNGOLOGY

## 2022-05-18 PROCEDURE — 78816 PET IMAGE W/CT FULL BODY: CPT | Mod: PS

## 2022-05-18 PROCEDURE — 82565 ASSAY OF CREATININE: CPT

## 2022-05-18 PROCEDURE — A9552 F18 FDG: HCPCS | Performed by: OTOLARYNGOLOGY

## 2022-05-18 RX ORDER — IOPAMIDOL 755 MG/ML
10-135 INJECTION, SOLUTION INTRAVASCULAR ONCE
Status: COMPLETED | OUTPATIENT
Start: 2022-05-18 | End: 2022-05-18

## 2022-05-18 RX ADMIN — IOPAMIDOL 100 ML: 755 INJECTION, SOLUTION INTRAVENOUS at 10:01

## 2022-05-18 RX ADMIN — FLUDEOXYGLUCOSE F-18 10.13 MCI.: 500 INJECTION, SOLUTION INTRAVENOUS at 09:07

## 2022-05-18 ASSESSMENT — PAIN SCALES - GENERAL: PAINLEVEL: NO PAIN (0)

## 2022-05-18 NOTE — LETTER
5/18/2022      RE: Flaco Willis III  [address] [place] [address]       Dear Dr. Friedman:    I had the pleasure of seeing Flaco Willis III in follow-up today at the [facility].     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 He developed a left neck mass which was evaluated by his PCP and then referred to his local ENT. He had a CT neck performed on 11/16/2021 which showed a 2.5 x 2.0 cm neck mass. He had an U/S of the neck performed on 12/8/2021 which showed a 3.1 x 2.3 x 1.7 cm left neck mass.  He had an FNA of the neck mass on 12/8/2021 which showed p16+ SCC. He had a PET scan on 1/4/2022 which showed focal uptake in the left tonsil with no obvious CT correlate, 2 cm FDG avid left level IIA node, no distant disease. He saw medical oncology at [facility] and was recommended for radiation +/- chemotherapy, recommended against surgery due to the need for postoperative chemoradiation. He then transferred his care to the [place]. He saw Dr Friedman on 1/19/2022 who discussed various treatment options with him. He met with Dr Malone in January 2022 to discuss radiation. He decided to proceed with surgery. He was taken to the OR on 2/15/2022 for left radical tonsillectomy and left neck dissection (levels II-IV). Intraoperatively inferior tonsil margin was positive for SCC. A re-resection was performed. Final pathology demonstrated a p16+ SCC in the tonsil of 2.2 mm focus in the inferior margin, a 7 mm focus of SCC, DOI 1 mm in the inferior tonsil margin, and 2 mm of residual SCC in the re-resected inferior tonsil specimen, with closest margin 1.5 mm. There was a 2.6 cm metastatic node in level IIA, no IMANI, 18 remaining nodes negative.      Interval history:  He comes in today for follow-up. He was last seen in April 2022. 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 He was started on gabapentin. He has  been seeing PT for his shoulder. He comes in today with his PET scan. He is still having issues with the first bite syndrome. He is taking the gabapentin, tried increasing his dose. He is seeing the PT for the shoulder. He is concerned about a mass in his shoulder. He saw his PCP who got an X-ray to ensure no sarcoma. He has no issues with his swallowing. He feels like his throat is smaller than before. He says the first bite is more like every bite. He says the symptoms are less with thinking about food than before. He mostly chews on the right side now. He notices the first bite will fatigue with eating. He can eat most anything. There are a few foods that cause less of the first bite. He feels like his numbness of his neck is improving. He feels like his clavicular area is more hollow.         MEDICATIONS:     Current Outpatient Medications   Medication Sig Dispense Refill     acetaminophen (TYLENOL) 325 MG tablet 2 tablets (650 mg) by Per NG tube route every 6 hours as needed for other (For optimal non-opioid multimodal pain management to improve pain control.) 30 tablet 0     aspirin 81 MG tablet Take 81 mg by mouth       atorvastatin (LIPITOR) 20 MG tablet Take 20 mg by mouth daily       busPIRone (BUSPAR) 10 MG tablet Take 10 mg by mouth 2 times daily        chlorhexidine (PERIDEX) 0.12 % solution Swish and spit a small amount after every meal and before bed 473 mL 0     Cholecalciferol (VITAMIN D3) 1000 UNITS CAPS Take 1,000 Units by mouth        dextroamphetamine (DEXEDRINE SPANSULE) 15 MG 24 hr capsule Take 15 mg by mouth        escitalopram (LEXAPRO) 10 MG tablet Take 10 mg by mouth daily       finasteride (PROPECIA) 1 MG tablet Take 1 mg by mouth daily        gabapentin (NEURONTIN) 100 MG capsule Take 3 capsules (300 mg) by mouth 2 times daily 180 capsule 0     LORazepam (ATIVAN) 2 MG/ML (HIGH CONC) oral solution 0.25-0.5 mLs (0.5-1 mg) by Oral or Feeding Tube route 2 times daily as needed for anxiety 7  mL 0     melatonin 3 MG tablet Take 1 tablet (3 mg) by mouth nightly as needed for sleep 14 tablet 1     mineral oil-hydrophilic petrolatum (AQUAPHOR) external ointment Apply a small amount of ointment to the incision and drain sites three times per day and as needed for crusting 99 g 0     naloxone (NARCAN) 4 MG/0.1ML nasal spray Spray 1 spray (4 mg) into one nostril alternating nostrils once as needed for opioid reversal every 2-3 minutes until assistance arrives 0.1 mL 0     Omega-3 Fatty Acids (FISH OIL PO)        tadalafil (CIALIS) 20 MG tablet Take 20 mg by mouth       VITAMIN E MTC PO        senna-docusate (SENOKOT-S/PERICOLACE) 8.6-50 MG tablet 1 tablet by Per NG tube route 2 times daily (Patient not taking: Reported on 2022) 28 tablet 0       ALLERGIES:    Allergies   Allergen Reactions     Augmentin Rash       HABITS/SOCIAL HISTORY:   Smoked on weekends previously several years ago. No chewing tobacco. Drinks beer on weekends.   .   Works in [employer] - does a lot of on [employer] work.     Social History     Socioeconomic History     Marital status:      Spouse name: Not on file     Number of children: Not on file     Years of education: Not on file     Highest education level: Not on file   Occupational History     Not on file   Tobacco Use     Smoking status: Former Smoker     Types: Cigars     Start date: 1973     Quit date: 1990     Years since quittin.3     Smokeless tobacco: Never Used   Substance and Sexual Activity     Alcohol use: Yes     Drug use: No     Sexual activity: Yes     Partners: Female     Birth control/protection: Pull-out method   Other Topics Concern     Not on file   Social History Narrative     Not on file     Social Determinants of Health     Financial Resource Strain: Not on file   Food Insecurity: Not on file   Transportation Needs: Not on file   Physical Activity: Not on file   Stress: Not on file   Social Connections: Not on file  "  Intimate Partner Violence: Not on file   Housing Stability: Not on file       PAST MEDICAL HISTORY:   Past Medical History:   Diagnosis Date     ADD (attention deficit disorder)      Anxiety      Crohn's disease (H)      Hyperlipidemia      Hypertension      Liposarcoma of lower extremity (H)         PAST SURGICAL HISTORY:   Past Surgical History:   Procedure Laterality Date     DISSECTION RADICAL NECK MODIFIED Left 2/15/2022    Procedure: left modified radical neck dissection.;  Surgeon: Summer Valladares MD;  [facility]     EXCISE SOFT TISSUE TUMOR THIGH  11/21/2013    Procedure: EXCISE SOFT TISSUE TUMOR THIGH;  Excision of Tumor Bed Left Medial Thigh;  Surgeon: Seymour Jesus MD;  Location: [facility] OR     HERNIA REPAIR       TONSILLECTOMY ADULT Left 2/15/2022    Procedure: left radical tonsillectomy, nasogastric tube placement;  Surgeon: Summer Valladares MD;  Location: [facility] OR     tumor removal of thigh[       vocal cord surgery[       WRIST SURGERY         FAMILY HISTORY:  No family history on file.    REVIEW OF SYSTEMS:  12 point ROS was negative other than the symptoms noted above in the HPI.  Patient Supplied Answers to Review of Systems  [facility] ROS 2/23/2022   Constitutional: Problems with sleep   Ears, Nose, Throat: Ear pain, Nasal congestion or drainage, Sore throat, Trouble swallowing, Hoarseness         PHYSICAL EXAMINATION:   BP (!) 204/103 (BP Location: Left arm, Patient Position: Sitting, Cuff Size: Adult Regular)   Pulse 106   Temp 98.6  F (37  C) (Temporal)   Ht 1.727 m (5' 8\")   Wt 75.3 kg (166 lb)   SpO2 97%   BMI 25.24 kg/m     Appearance:   normal; NAD, age-appropriate appearance, well-developed, normal habitus   Communication:   normal; communicates verbally, normal voice quality   Head/Face:   inspection -  Normal; no scars or visible lesions   Salivary glands -  Normal size, no tenderness, swelling, or palpable masses   Facial strength -  Normal and symmetric    Skin:  " normal, no rash   Ears:  auricle (AD) -  normal  auricle (AS) -  normal  Normal clinical speech reception   Nose:  Ext. inspection -  Normal  Internal Inspection -  Normal mucosa, septum, and turbinates   Nasopharynx:  normal mucosa, no masses   Oral Cavity:  lips -  Normal mucosa, oral competence, and stoma size   Age-appropriate dentition, healthy gingival mucosa   Hard palate, buccal, floor of mouth mucosa normal   Tongue - normal movement, no lesions, posteriorly the tongue is tethered to the RMT/tonsil defect on the left, no palpable masses, some scar tissue posteriorly on palpation, some tenderness   Oropharynx:  mucosa -  Normal, no lesions  soft palate -  Normal, no lesions, no asymmetry, normal elevation  tonsils -  S/p left radical tonsillectomy with no masses palpable or on visual inspection, no mucosal lesions   Hypopharynx:  Normal pyriform sinus and pharyngeal wall mucosa   No pooled secretions    Larynx:  Epiglottis, AE folds, false vocal cords, true vocal cords, arytenoids normal in appearance, bilaterally mobile cords    Neck: Well healed left neck incision  No palpable masses  Some tenderness to palpation in level IIB  Decreased ROM of left shoulder   Lymphatic:  no abnormal nodes   Cardiovascular:  warm, pink, well-perfused extremities without swelling, tenderness, or edema   Respiratory:  Normal respiratory effort, no stridor   Neuro/Psych.:  mood/affect -  normal  mental status -  normal       PROCEDURES:   Flexible fiberoptic laryngoscopy: Scope exam was indicated due to tonsil cancer. Verbal consent was obtained. The nasal cavity was prepped with an aerosolized solution of topical anesthetic and vasoconstrictive agent. The scope was passed through the anterior nasal cavity and advanced. Inspection of the nasopharynx revealed no gross abnormality. The base of tongue and vallecula are normal with the exception of the expected postoperative changes to the left base of tongue/tonsil without  evidence of residual cancer. The epiglottis, AE folds, false cords, true cords, arytenoids are normal.  Inspection of the larynx revealed bilaterally mobile vocal cords. Pyriform sinuses are symmetric. The airway is patent. Procedure tolerated well with no immediate complications noted.    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 He underwent surgical resection with left radical tonsillectomy and left neck dissection on 2/15/2022. He was recommended for no further treatment.    He has no evidence of recurrent disease.    He has first bite syndrome. He is on gabapentin. We discussed that if he stops the gabapentin he will have to wean off. We can refer him to Dr Shaquille Block for consideration of botox injections.    He is seeing PT for his shoulder. I discussed with him that his area of concern along the left shoulder is not consistent on exam or imaging with recurrent sarcoma.     He had his post treatment PET scan today. There is no evidence of recurrence.  We will repeat imaging in 6 months.    I will see him back in 3 months.    Thank you very much for the opportunity to participate in the care of your patient.      Summer Valladares MD  Otolaryngology- Head & Neck Surgery      This note was dictated with voice recognition software and then edited. Please excuse any unintentional errors.       I spent a total of 36 minutes on patient visit on date of service including patient visit (25 minutes) and charting activities, not including procedures.        CC:  Ashok Friedman MD  Department of Medical Oncology  [facility]      Alcides Malone MD  Department of Radiation Oncology  [facility]

## 2022-05-18 NOTE — PATIENT INSTRUCTIONS
F/u in 3 months     Schedule new pt consult with Dr Shaquille Block for First bite syndrome for consideration of botox injection

## 2022-05-18 NOTE — LETTER
5/18/2022       RE: Flaco Willis III  [address] [place] [address]     Dear Colleague,    Thank you for referring your patient, Flaco Willis III, to the [facility] [place] at [facility]. Please see a copy of my visit note below.    Dear Dr. Friedman:    I had the pleasure of seeing Flaco Willis III in follow-up today at the [facility].     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 He developed a left neck mass which was evaluated by his PCP and then referred to his local ENT. He had a CT neck performed on 11/16/2021 which showed a 2.5 x 2.0 cm neck mass. He had an U/S of the neck performed on 12/8/2021 which showed a 3.1 x 2.3 x 1.7 cm left neck mass.  He had an FNA of the neck mass on 12/8/2021 which showed p16+ SCC. He had a PET scan on 1/4/2022 which showed focal uptake in the left tonsil with no obvious CT correlate, 2 cm FDG avid left level IIA node, no distant disease. He saw medical oncology at [facility] and was recommended for radiation +/- chemotherapy, recommended against surgery due to the need for postoperative chemoradiation. He then transferred his care to the [place]. He saw Dr Friedman on 1/19/2022 who discussed various treatment options with him. He met with Dr Malone in January 2022 to discuss radiation. He decided to proceed with surgery. He was taken to the OR on 2/15/2022 for left radical tonsillectomy and left neck dissection (levels II-IV). Intraoperatively inferior tonsil margin was positive for SCC. A re-resection was performed. Final pathology demonstrated a p16+ SCC in the tonsil of 2.2 mm focus in the inferior margin, a 7 mm focus of SCC, DOI 1 mm in the inferior tonsil margin, and 2 mm of residual SCC in the re-resected inferior tonsil specimen, with closest margin 1.5 mm. There was a 2.6 cm  metastatic node in level IIA, no IMANI, 18 remaining nodes negative.      Interval history:  He comes in today for follow-up. He was last seen in April 2022. 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 He was started on gabapentin. He has been seeing PT for his shoulder. He comes in today with his PET scan. He is still having issues with the first bite syndrome. He is taking the gabapentin, tried increasing his dose. He is seeing the PT for the shoulder. He is concerned about a mass in his shoulder. He saw his PCP who got an X-ray to ensure no sarcoma. He has no issues with his swallowing. He feels like his throat is smaller than before. He says the first bite is more like every bite. He says the symptoms are less with thinking about food than before. He mostly chews on the right side now. He notices the first bite will fatigue with eating. He can eat most anything. There are a few foods that cause less of the first bite. He feels like his numbness of his neck is improving. He feels like his clavicular area is more hollow.         MEDICATIONS:     Current Outpatient Medications   Medication Sig Dispense Refill     acetaminophen (TYLENOL) 325 MG tablet 2 tablets (650 mg) by Per NG tube route every 6 hours as needed for other (For optimal non-opioid multimodal pain management to improve pain control.) 30 tablet 0     aspirin 81 MG tablet Take 81 mg by mouth       atorvastatin (LIPITOR) 20 MG tablet Take 20 mg by mouth daily       busPIRone (BUSPAR) 10 MG tablet Take 10 mg by mouth 2 times daily        chlorhexidine (PERIDEX) 0.12 % solution Swish and spit a small amount after every meal and before bed 473 mL 0     Cholecalciferol (VITAMIN D3) 1000 UNITS CAPS Take 1,000 Units by mouth        dextroamphetamine (DEXEDRINE SPANSULE) 15 MG 24 hr capsule Take 15 mg by mouth        escitalopram (LEXAPRO) 10 MG tablet Take 10 mg by mouth daily       finasteride (PROPECIA) 1 MG tablet Take 1 mg by mouth daily         gabapentin (NEURONTIN) 100 MG capsule Take 3 capsules (300 mg) by mouth 2 times daily 180 capsule 0     LORazepam (ATIVAN) 2 MG/ML (HIGH CONC) oral solution 0.25-0.5 mLs (0.5-1 mg) by Oral or Feeding Tube route 2 times daily as needed for anxiety 7 mL 0     melatonin 3 MG tablet Take 1 tablet (3 mg) by mouth nightly as needed for sleep 14 tablet 1     mineral oil-hydrophilic petrolatum (AQUAPHOR) external ointment Apply a small amount of ointment to the incision and drain sites three times per day and as needed for crusting 99 g 0     naloxone (NARCAN) 4 MG/0.1ML nasal spray Spray 1 spray (4 mg) into one nostril alternating nostrils once as needed for opioid reversal every 2-3 minutes until assistance arrives 0.1 mL 0     Omega-3 Fatty Acids (FISH OIL PO)        tadalafil (CIALIS) 20 MG tablet Take 20 mg by mouth       VITAMIN E MTC PO        senna-docusate (SENOKOT-S/PERICOLACE) 8.6-50 MG tablet 1 tablet by Per NG tube route 2 times daily (Patient not taking: Reported on 2022) 28 tablet 0       ALLERGIES:    Allergies   Allergen Reactions     Augmentin Rash       HABITS/SOCIAL HISTORY:   Smoked on weekends previously several years ago. No chewing tobacco. Drinks beer on weekends.   .   Works in [email or web address] - does a lot of on [employer] work.     Social History     Socioeconomic History     Marital status:      Spouse name: Not on file     Number of children: Not on file     Years of education: Not on file     Highest education level: Not on file   Occupational History     Not on file   Tobacco Use     Smoking status: Former Smoker     Types: Cigars     Start date: 1973     Quit date: 1990     Years since quittin.3     Smokeless tobacco: Never Used   Substance and Sexual Activity     Alcohol use: Yes     Drug use: No     Sexual activity: Yes     Partners: Female     Birth control/protection: Pull-out method   Other Topics Concern     Not on file   Social History Narrative     Not  "on file     Social Determinants of Health     Financial Resource Strain: Not on file   Food Insecurity: Not on file   Transportation Needs: Not on file   Physical Activity: Not on file   Stress: Not on file   Social Connections: Not on file   Intimate Partner Violence: Not on file   Housing Stability: Not on file       PAST MEDICAL HISTORY:   Past Medical History:   Diagnosis Date     ADD (attention deficit disorder)      Anxiety      Crohn's disease (H)      Hyperlipidemia      Hypertension      Liposarcoma of lower extremity (H)         PAST SURGICAL HISTORY:   Past Surgical History:   Procedure Laterality Date     DISSECTION RADICAL NECK MODIFIED Left 2/15/2022    Procedure: left modified radical neck dissection.;  Surgeon: Summer Valladares MD;  Location: [facility] OR     EXCISE SOFT TISSUE TUMOR THIGH  11/21/2013    Procedure: EXCISE SOFT TISSUE TUMOR THIGH;  Excision of Tumor Bed Left Medial Thigh;  Surgeon: Seymour Jesus MD;  Location: [facility] OR     HERNIA REPAIR       TONSILLECTOMY ADULT Left 2/15/2022    Procedure: left radical tonsillectomy, nasogastric tube placement;  Surgeon: Summer Valladares MD;  Location: [facility] OR     tumor removal of thigh[       vocal cord surgery[       WRIST SURGERY         FAMILY HISTORY:  No family history on file.    REVIEW OF SYSTEMS:  12 point ROS was negative other than the symptoms noted above in the HPI.  Patient Supplied Answers to Review of Systems  [facility] ROS 2/23/2022   Constitutional: Problems with sleep   Ears, Nose, Throat: Ear pain, Nasal congestion or drainage, Sore throat, Trouble swallowing, Hoarseness         PHYSICAL EXAMINATION:   BP (!) 204/103 (BP Location: Left arm, Patient Position: Sitting, Cuff Size: Adult Regular)   Pulse 106   Temp 98.6  F (37  C) (Temporal)   Ht 1.727 m (5' 8\")   Wt 75.3 kg (166 lb)   SpO2 97%   BMI 25.24 kg/m     Appearance:   normal; NAD, age-appropriate appearance, well-developed, normal habitus   Communication:   normal; " communicates verbally, normal voice quality   Head/Face:   inspection -  Normal; no scars or visible lesions   Salivary glands -  Normal size, no tenderness, swelling, or palpable masses   Facial strength -  Normal and symmetric    Skin:  normal, no rash   Ears:  auricle (AD) -  normal  auricle (AS) -  normal  Normal clinical speech reception   Nose:  Ext. inspection -  Normal  Internal Inspection -  Normal mucosa, septum, and turbinates   Nasopharynx:  normal mucosa, no masses   Oral Cavity:  lips -  Normal mucosa, oral competence, and stoma size   Age-appropriate dentition, healthy gingival mucosa   Hard palate, buccal, floor of mouth mucosa normal   Tongue - normal movement, no lesions, posteriorly the tongue is tethered to the RMT/tonsil defect on the left, no palpable masses, some scar tissue posteriorly on palpation, some tenderness   Oropharynx:  mucosa -  Normal, no lesions  soft palate -  Normal, no lesions, no asymmetry, normal elevation  tonsils -  S/p left radical tonsillectomy with no masses palpable or on visual inspection, no mucosal lesions   Hypopharynx:  Normal pyriform sinus and pharyngeal wall mucosa   No pooled secretions    Larynx:  Epiglottis, AE folds, false vocal cords, true vocal cords, arytenoids normal in appearance, bilaterally mobile cords    Neck: Well healed left neck incision  No palpable masses  Some tenderness to palpation in level IIB  Decreased ROM of left shoulder   Lymphatic:  no abnormal nodes   Cardiovascular:  warm, pink, well-perfused extremities without swelling, tenderness, or edema   Respiratory:  Normal respiratory effort, no stridor   Neuro/Psych.:  mood/affect -  normal  mental status -  normal       PROCEDURES:   Flexible fiberoptic laryngoscopy: Scope exam was indicated due to tonsil cancer. Verbal consent was obtained. The nasal cavity was prepped with an aerosolized solution of topical anesthetic and vasoconstrictive agent. The scope was passed through the  anterior nasal cavity and advanced. Inspection of the nasopharynx revealed no gross abnormality. The base of tongue and vallecula are normal with the exception of the expected postoperative changes to the left base of tongue/tonsil without evidence of residual cancer. The epiglottis, AE folds, false cords, true cords, arytenoids are normal.  Inspection of the larynx revealed bilaterally mobile vocal cords. Pyriform sinuses are symmetric. The airway is patent. Procedure tolerated well with no immediate complications noted.    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 He underwent surgical resection with left radical tonsillectomy and left neck dissection on 2/15/2022. He was recommended for no further treatment.    He has no evidence of recurrent disease.    He has first bite syndrome. He is on gabapentin. We discussed that if he stops the gabapentin he will have to wean off. We can refer him to Dr Shaquille Block for consideration of botox injections.    He is seeing PT for his shoulder. I discussed with him that his area of concern along the left shoulder is not consistent on exam or imaging with recurrent sarcoma.     He had his post treatment PET scan today. There is no evidence of recurrence.  We will repeat imaging in 6 months.    I will see him back in 3 months.    Thank you very much for the opportunity to participate in the care of your patient.      Summer Valladares MD  Otolaryngology- Head & Neck Surgery      This note was dictated with voice recognition software and then edited. Please excuse any unintentional errors.       I spent a total of 36 minutes on patient visit on date of service including patient visit (25 minutes) and charting activities, not including procedures.        CC:  Ashok Friedman MD  Department of Medical Oncology  [facility]  [place]      Alcides Malone MD  Department of Radiation Oncology  [facility]        Again, thank you for allowing me to participate in the care of your patient.      Sincerely,    Summer Valladares MD

## 2022-05-19 ENCOUNTER — HOSPITAL ENCOUNTER (OUTPATIENT)
Dept: PHYSICAL THERAPY | Facility: CLINIC | Age: 67
Discharge: HOME | End: 2022-05-19
Payer: COMMERCIAL

## 2022-05-19 DIAGNOSIS — C77.0 SECONDARY MALIGNANCY OF LYMPH NODES OF HEAD, FACE AND NECK (H): Primary | ICD-10-CM

## 2022-05-19 DIAGNOSIS — R29.898 WEAKNESS OF SHOULDER: ICD-10-CM

## 2022-05-19 PROCEDURE — 97032 APPL MODALITY 1+ESTIM EA 15: CPT | Mod: GP | Performed by: PHYSICAL THERAPIST

## 2022-05-19 PROCEDURE — 97110 THERAPEUTIC EXERCISES: CPT | Mod: GP | Performed by: PHYSICAL THERAPIST

## 2022-06-02 DIAGNOSIS — C10.9 MALIGNANT NEOPLASM OF OROPHARYNX (H): ICD-10-CM

## 2022-06-02 RX ORDER — GABAPENTIN 100 MG/1
300 CAPSULE ORAL 2 TIMES DAILY
Qty: 180 CAPSULE | Refills: 0 | Status: SHIPPED | OUTPATIENT
Start: 2022-06-02 | End: 2023-08-23

## 2022-06-10 ENCOUNTER — OFFICE VISIT (OUTPATIENT)
Dept: PLASTIC SURGERY | Facility: CLINIC | Age: 67
End: 2022-06-10
Payer: COMMERCIAL

## 2022-06-10 DIAGNOSIS — R52 PAIN AGGRAVATED BY MASTICATION: Primary | ICD-10-CM

## 2022-06-10 NOTE — PROGRESS NOTES
Facial Plastic and Reconstructive Surgery Consultation    Referring Provider:   Summer Valladares MD  [address] [place] [address]    HPI:   I had the pleasure of seeing Flaco Willis III today in clinic for consultation for pain with biting.  The patient describes that when he thinks about food or something like pickles he has a significant discomfort on the left cheek he, he points primarily to the angle of the mandible and the above.  He describes that when he goes to eat and takes a first bite he has a severe discomfort on that left side.  He states that as he eats it begins to improve but that this is quite debilitating.  He describes that this is a consistent struggle that he is facing.  This was not present prior to surgery.  He noted it a couple weeks after the procedure.  He has been placed on gabapentin which she feels has not been managing symptoms.  He was referred today for discussion for chemodenervation.       Review Of Systems  ROS: 10 point ROS neg other than the symptoms noted above in the HPI.    Patient Active Problem List   Diagnosis     Liposarcoma (H)     Sarcoma (H)     Attention deficit hyperactivity disorder (ADHD), combined type     Regional enteritis (H)     Erectile dysfunction due to arterial insufficiency     Hair loss     Hypercholesteremia     Hypertension     ADD (attention deficit disorder)     Low testosterone     Tonsillar cancer (H)     Past Surgical History:   Procedure Laterality Date     DISSECTION RADICAL NECK MODIFIED Left 2/15/2022    Procedure: left modified radical neck dissection.;  Surgeon: Summer Valladares MD;  [facility]     EXCISE SOFT TISSUE TUMOR THIGH  11/21/2013    Procedure: EXCISE SOFT TISSUE TUMOR THIGH;  Excision of Tumor Bed Left Medial Thigh;  Surgeon: Seymour Jesus MD;  Location: [facility]     HERNIA REPAIR       TONSILLECTOMY ADULT Left 2/15/2022    Procedure: left radical tonsillectomy, nasogastric tube placement;  Surgeon:  Summer Valladares MD;  Location: [facility]     tumor removal of thigh[       vocal cord surgery[       WRIST SURGERY       Current Outpatient Medications   Medication Sig Dispense Refill     acetaminophen (TYLENOL) 325 MG tablet 2 tablets (650 mg) by Per NG tube route every 6 hours as needed for other (For optimal non-opioid multimodal pain management to improve pain control.) 30 tablet 0     aspirin 81 MG tablet Take 81 mg by mouth       atorvastatin (LIPITOR) 20 MG tablet Take 20 mg by mouth daily       busPIRone (BUSPAR) 10 MG tablet Take 10 mg by mouth 2 times daily        chlorhexidine (PERIDEX) 0.12 % solution Swish and spit a small amount after every meal and before bed 473 mL 0     Cholecalciferol (VITAMIN D3) 1000 UNITS CAPS Take 1,000 Units by mouth        dextroamphetamine (DEXEDRINE SPANSULE) 15 MG 24 hr capsule Take 15 mg by mouth        escitalopram (LEXAPRO) 10 MG tablet Take 10 mg by mouth daily       finasteride (PROPECIA) 1 MG tablet Take 1 mg by mouth daily        gabapentin (NEURONTIN) 100 MG capsule Take 3 capsules (300 mg) by mouth 2 times daily 180 capsule 0     LORazepam (ATIVAN) 2 MG/ML (HIGH CONC) oral solution 0.25-0.5 mLs (0.5-1 mg) by Oral or Feeding Tube route 2 times daily as needed for anxiety 7 mL 0     melatonin 3 MG tablet Take 1 tablet (3 mg) by mouth nightly as needed for sleep 14 tablet 1     mineral oil-hydrophilic petrolatum (AQUAPHOR) external ointment Apply a small amount of ointment to the incision and drain sites three times per day and as needed for crusting 99 g 0     naloxone (NARCAN) 4 MG/0.1ML nasal spray Spray 1 spray (4 mg) into one nostril alternating nostrils once as needed for opioid reversal every 2-3 minutes until assistance arrives 0.1 mL 0     Omega-3 Fatty Acids (FISH OIL PO)        senna-docusate (SENOKOT-S/PERICOLACE) 8.6-50 MG tablet 1 tablet by Per NG tube route 2 times daily (Patient not taking: Reported on 5/18/2022) 28 tablet 0     tadalafil (CIALIS) 20  MG tablet Take 20 mg by mouth       VITAMIN E MTC PO        Augmentin  Social History     Socioeconomic History     Marital status:      Spouse name: Not on file     Number of children: Not on file     Years of education: Not on file     Highest education level: Not on file   Occupational History     Not on file   Tobacco Use     Smoking status: Former Smoker     Types: Cigars     Start date: 1973     Quit date: 1990     Years since quittin.4     Smokeless tobacco: Never Used   Substance and Sexual Activity     Alcohol use: Yes     Drug use: No     Sexual activity: Yes     Partners: Female     Birth control/protection: Pull-out method   Other Topics Concern     Not on file   Social History Narrative     Not on file     Social Determinants of Health     Financial Resource Strain: Not on file   Food Insecurity: Not on file   Transportation Needs: Not on file   Physical Activity: Not on file   Stress: Not on file   Social Connections: Not on file   Intimate Partner Violence: Not on file   Housing Stability: Not on file     No family history on file.    PE:  Alert and Oriented, Answering Questions Appropriately  Atraumatic, Normocephalic, Face Symmetric  Mild hoarseness to the voice  Incision on the left neck that is well-healed within a skin crease    IMPRESSION:    First Bite Syndrome   Severe cramping at the left parotid region   H/o tonsil SCC       PLAN:    Signs and symptoms consistent with first bit syndrome.  Will benefit from chemodenervation with botulinum toxin  Discussed this with him today   We will work to obtain prior authorization for the treatment.  All of his questions were answered today.      Photodocumentation was obtained.     I spent a total of 45 minutes in the care of patient Flaco Willis III during today's office visit. This time includes reviewing the patient's chart and prior history, obtaining a history, performing an examination and evaluation and counseling the  patient. This time also includes ordering mediations or tests necessary in addition to communication to other member's of the patient's health care team. Time spent in documentation and care coordination is included.

## 2022-06-10 NOTE — LETTER
Marti 10, 2022  Re: Flaco Willis III  1955    Dear Dr. Valladares,    Thank you so much for referring Flaco Willis III to the [facility]. I had the pleasure of visiting with Flaco today.     Attached you will find a copy of my note. Please feel free to reach out to me with any questions, [phone].     Facial Plastic and Reconstructive Surgery Consultation    Referring Provider:   Summer Valladares MD  [address] [place] [id number]    HPI:   I had the pleasure of seeing Flaco Willis III today in clinic for consultation for pain with biting.  The patient describes that when he thinks about food or something like pickles he has a significant discomfort on the left cheek he, he points primarily to the angle of the mandible and the above.  He describes that when he goes to eat and takes a first bite he has a severe discomfort on that left side.  He states that as he eats it begins to improve but that this is quite debilitating.  He describes that this is a consistent struggle that he is facing.  This was not present prior to surgery.  He noted it a couple weeks after the procedure.  He has been placed on gabapentin which she feels has not been managing symptoms.  He was referred today for discussion for chemodenervation.       Review Of Systems  ROS: 10 point ROS neg other than the symptoms noted above in the HPI.    Patient Active Problem List   Diagnosis     Liposarcoma (H)     Sarcoma (H)     Attention deficit hyperactivity disorder (ADHD), combined type     Regional enteritis (H)     Erectile dysfunction due to arterial insufficiency     Hair loss     Hypercholesteremia     Hypertension     ADD (attention deficit disorder)     Low testosterone     Tonsillar cancer (H)     Past Surgical History:   Procedure Laterality Date     DISSECTION RADICAL NECK MODIFIED Left 2/15/2022    Procedure: left modified radical neck dissection.;  Surgeon: Summer Valladares MD;  [facility]  SOFT TISSUE TUMOR THIGH  11/21/2013    Procedure: EXCISE SOFT TISSUE TUMOR THIGH;  Excision of Tumor Bed Left Medial Thigh;  Surgeon: Seymour Jesus MD;  [facility]     HERNIA REPAIR       TONSILLECTOMY ADULT Left 2/15/2022    Procedure: left radical tonsillectomy, nasogastric tube placement;  Surgeon: Summer Valladares MD;  Location: [facility]     tumor removal of thigh[       vocal cord surgery[       WRIST SURGERY       Current Outpatient Medications   Medication Sig Dispense Refill     acetaminophen (TYLENOL) 325 MG tablet 2 tablets (650 mg) by Per NG tube route every 6 hours as needed for other (For optimal non-opioid multimodal pain management to improve pain control.) 30 tablet 0     aspirin 81 MG tablet Take 81 mg by mouth       atorvastatin (LIPITOR) 20 MG tablet Take 20 mg by mouth daily       busPIRone (BUSPAR) 10 MG tablet Take 10 mg by mouth 2 times daily        chlorhexidine (PERIDEX) 0.12 % solution Swish and spit a small amount after every meal and before bed 473 mL 0     Cholecalciferol (VITAMIN D3) 1000 UNITS CAPS Take 1,000 Units by mouth        dextroamphetamine (DEXEDRINE SPANSULE) 15 MG 24 hr capsule Take 15 mg by mouth        escitalopram (LEXAPRO) 10 MG tablet Take 10 mg by mouth daily       finasteride (PROPECIA) 1 MG tablet Take 1 mg by mouth daily        gabapentin (NEURONTIN) 100 MG capsule Take 3 capsules (300 mg) by mouth 2 times daily 180 capsule 0     LORazepam (ATIVAN) 2 MG/ML (HIGH CONC) oral solution 0.25-0.5 mLs (0.5-1 mg) by Oral or Feeding Tube route 2 times daily as needed for anxiety 7 mL 0     melatonin 3 MG tablet Take 1 tablet (3 mg) by mouth nightly as needed for sleep 14 tablet 1     mineral oil-hydrophilic petrolatum (AQUAPHOR) external ointment Apply a small amount of ointment to the incision and drain sites three times per day and as needed for crusting 99 g 0     naloxone (NARCAN) 4 MG/0.1ML nasal spray Spray 1 spray (4 mg) into one nostril alternating  nostrils once as needed for opioid reversal every 2-3 minutes until assistance arrives 0.1 mL 0     Omega-3 Fatty Acids (FISH OIL PO)        senna-docusate (SENOKOT-S/PERICOLACE) 8.6-50 MG tablet 1 tablet by Per NG tube route 2 times daily (Patient not taking: Reported on 2022) 28 tablet 0     tadalafil (CIALIS) 20 MG tablet Take 20 mg by mouth       VITAMIN E MTC PO        Augmentin  Social History     Socioeconomic History     Marital status:      Spouse name: Not on file     Number of children: Not on file     Years of education: Not on file     Highest education level: Not on file   Occupational History     Not on file   Tobacco Use     Smoking status: Former Smoker     Types: Cigars     Start date: 1973     Quit date: 1990     Years since quittin.4     Smokeless tobacco: Never Used   Substance and Sexual Activity     Alcohol use: Yes     Drug use: No     Sexual activity: Yes     Partners: Female     Birth control/protection: Pull-out method   Other Topics Concern     Not on file   Social History Narrative     Not on file     Social Determinants of Health     Financial Resource Strain: Not on file   Food Insecurity: Not on file   Transportation Needs: Not on file   Physical Activity: Not on file   Stress: Not on file   Social Connections: Not on file   Intimate Partner Violence: Not on file   Housing Stability: Not on file     No family history on file.    PE:  Alert and Oriented, Answering Questions Appropriately  Atraumatic, Normocephalic, Face Symmetric  Mild hoarseness to the voice  Incision on the left neck that is well-healed within a skin crease    IMPRESSION:    First Bite Syndrome   Severe cramping at the left parotid region   H/o tonsil SCC       PLAN:    Signs and symptoms consistent with first bit syndrome.  Will benefit from chemodenervation with botulinum toxin  Discussed this with him today   We will work to obtain prior authorization for the treatment.  All of his  questions were answered today.      Photodocumentation was obtained.     I spent a total of 45 minutes in the care of patient Flaco Willis III during today's office visit. This time includes reviewing the patient's chart and prior history, obtaining a history, performing an examination and evaluation and counseling the patient. This time also includes ordering mediations or tests necessary in addition to communication to other member's of the patient's health care team. Time spent in documentation and care coordination is included.         Sincerely,  Anette Keane MD

## 2022-06-10 NOTE — LETTER
6/10/2022       RE: Flaco Willis III  [address] [place] [address]     Dear Colleague,    Thank you for referring your patient, Flaco Willis III, to the [facility]. Please see a copy of my visit note below.    Facial Plastic and Reconstructive Surgery Consultation    Referring Provider:   Summer Valladares MD  [address] [place] [id number]    HPI:   I had the pleasure of seeing Flaco Willis III today in clinic for consultation for pain with biting.  The patient describes that when he thinks about food or something like pickles he has a significant discomfort on the left cheek he, he points primarily to the angle of the mandible and the above.  He describes that when he goes to eat and takes a first bite he has a severe discomfort on that left side.  He states that as he eats it begins to improve but that this is quite debilitating.  He describes that this is a consistent struggle that he is facing.  This was not present prior to surgery.  He noted it a couple weeks after the procedure.  He has been placed on gabapentin which she feels has not been managing symptoms.  He was referred today for discussion for chemodenervation.       Review Of Systems  ROS: 10 point ROS neg other than the symptoms noted above in the HPI.    Patient Active Problem List   Diagnosis     Liposarcoma (H)     Sarcoma (H)     Attention deficit hyperactivity disorder (ADHD), combined type     Regional enteritis (H)     Erectile dysfunction due to arterial insufficiency     Hair loss     Hypercholesteremia     Hypertension     ADD (attention deficit disorder)     Low testosterone     Tonsillar cancer (H)     Past Surgical History:   Procedure Laterality Date     DISSECTION RADICAL NECK MODIFIED Left 2/15/2022    Procedure: left modified radical neck dissection.;  Surgeon: Summer Valladares MD;  [facility]     EXCISE SOFT TISSUE TUMOR THIGH   11/21/2013    Procedure: EXCISE SOFT TISSUE TUMOR THIGH;  Excision of Tumor Bed Left Medial Thigh;  Surgeon: Seymour Jesus MD;  [facility]     HERNIA REPAIR       TONSILLECTOMY ADULT Left 2/15/2022    Procedure: left radical tonsillectomy, nasogastric tube placement;  Surgeon: Summer Valladares MD;  [facility]: [facility]     tumor removal of thigh[       vocal cord surgery[       WRIST SURGERY       Current Outpatient Medications   Medication Sig Dispense Refill     acetaminophen (TYLENOL) 325 MG tablet 2 tablets (650 mg) by Per NG tube route every 6 hours as needed for other (For optimal non-opioid multimodal pain management to improve pain control.) 30 tablet 0     aspirin 81 MG tablet Take 81 mg by mouth       atorvastatin (LIPITOR) 20 MG tablet Take 20 mg by mouth daily       busPIRone (BUSPAR) 10 MG tablet Take 10 mg by mouth 2 times daily        chlorhexidine (PERIDEX) 0.12 % solution Swish and spit a small amount after every meal and before bed 473 mL 0     Cholecalciferol (VITAMIN D3) 1000 UNITS CAPS Take 1,000 Units by mouth        dextroamphetamine (DEXEDRINE SPANSULE) 15 MG 24 hr capsule Take 15 mg by mouth        escitalopram (LEXAPRO) 10 MG tablet Take 10 mg by mouth daily       finasteride (PROPECIA) 1 MG tablet Take 1 mg by mouth daily        gabapentin (NEURONTIN) 100 MG capsule Take 3 capsules (300 mg) by mouth 2 times daily 180 capsule 0     LORazepam (ATIVAN) 2 MG/ML (HIGH CONC) oral solution 0.25-0.5 mLs (0.5-1 mg) by Oral or Feeding Tube route 2 times daily as needed for anxiety 7 mL 0     melatonin 3 MG tablet Take 1 tablet (3 mg) by mouth nightly as needed for sleep 14 tablet 1     mineral oil-hydrophilic petrolatum (AQUAPHOR) external ointment Apply a small amount of ointment to the incision and drain sites three times per day and as needed for crusting 99 g 0     naloxone (NARCAN) 4 MG/0.1ML nasal spray Spray 1 spray (4 mg) into one nostril alternating nostrils once as needed for  opioid reversal every 2-3 minutes until assistance arrives 0.1 mL 0     Omega-3 Fatty Acids (FISH OIL PO)        senna-docusate (SENOKOT-S/PERICOLACE) 8.6-50 MG tablet 1 tablet by Per NG tube route 2 times daily (Patient not taking: Reported on 2022) 28 tablet 0     tadalafil (CIALIS) 20 MG tablet Take 20 mg by mouth       VITAMIN E MTC PO        Augmentin  Social History     Socioeconomic History     Marital status:      Spouse name: Not on file     Number of children: Not on file     Years of education: Not on file     Highest education level: Not on file   Occupational History     Not on file   Tobacco Use     Smoking status: Former Smoker     Types: Cigars     Start date: 1973     Quit date: 1990     Years since quittin.4     Smokeless tobacco: Never Used   Substance and Sexual Activity     Alcohol use: Yes     Drug use: No     Sexual activity: Yes     Partners: Female     Birth control/protection: Pull-out method   Other Topics Concern     Not on file   Social History Narrative     Not on file     Social Determinants of Health     Financial Resource Strain: Not on file   Food Insecurity: Not on file   Transportation Needs: Not on file   Physical Activity: Not on file   Stress: Not on file   Social Connections: Not on file   Intimate Partner Violence: Not on file   Housing Stability: Not on file     No family history on file.    PE:  Alert and Oriented, Answering Questions Appropriately  Atraumatic, Normocephalic, Face Symmetric  Mild hoarseness to the voice  Incision on the left neck that is well-healed within a skin crease    IMPRESSION:    First Bite Syndrome   Severe cramping at the left parotid region   H/o tonsil SCC       PLAN:    Signs and symptoms consistent with first bit syndrome.  Will benefit from chemodenervation with botulinum toxin  Discussed this with him today   We will work to obtain prior authorization for the treatment.  All of his questions were answered  today.      Photodocumentation was obtained.     I spent a total of 45 minutes in the care of patient Flaco Willis III during today's office visit. This time includes reviewing the patient's chart and prior history, obtaining a history, performing an examination and evaluation and counseling the patient. This time also includes ordering mediations or tests necessary in addition to communication to other member's of the patient's health care team. Time spent in documentation and care coordination is included.       Sincerely,    Anette Keane MD

## 2022-06-17 NOTE — NURSING NOTE
Photodocumentation obtained.    Will work to obtain PA for Botox for First Bite Syndrome.    Eva Gallardo RN  6/17/2022 3:23 PM

## 2022-06-23 ENCOUNTER — PATIENT OUTREACH (OUTPATIENT)
Dept: OTOLARYNGOLOGY | Facility: CLINIC | Age: 67
End: 2022-06-23

## 2022-06-23 NOTE — TELEPHONE ENCOUNTER
Received a call from patient requesting a plan for weaning from his gabapentin. He indicates that he does not feel much improvement in his first bite syndrome from the gabapentin and would like to wean from this. He indicates he is currently taking 300 mg daily (100mg in the am and 200 mg at night). He has been weaning by 100 mg a week. Discussed with patient that this weaning schedule is appropriate and he can continue to wean until down to 100 mg and then stop. Patient agreeable and will call with any further questions or concerns.       Lesa Carrillo, RN, BSN

## 2022-06-29 ENCOUNTER — HOSPITAL ENCOUNTER (OUTPATIENT)
Dept: PHYSICAL THERAPY | Facility: CLINIC | Age: 67
Discharge: HOME | End: 2022-06-29
Payer: COMMERCIAL

## 2022-06-29 DIAGNOSIS — R29.898 WEAKNESS OF SHOULDER: ICD-10-CM

## 2022-06-29 DIAGNOSIS — C77.0 SECONDARY MALIGNANCY OF LYMPH NODES OF HEAD, FACE AND NECK (H): Primary | ICD-10-CM

## 2022-06-29 PROCEDURE — 97140 MANUAL THERAPY 1/> REGIONS: CPT | Mod: GP | Performed by: PHYSICAL THERAPIST

## 2022-06-29 PROCEDURE — 97110 THERAPEUTIC EXERCISES: CPT | Mod: GP | Performed by: PHYSICAL THERAPIST

## 2022-06-30 NOTE — PROGRESS NOTES
[facility]    OUTPATIENT PHYSICAL THERAPY  PLAN OF TREATMENT FOR OUTPATIENT REHABILITATION AND PROGRESS NOTE           Patient's Last Name, First Name, Flaco Watters    Date of Birth  1955   Provider's Name  [facility] Medical Record No.  [id number]    Onset Date  2/15/22 Start of Care Date  4/5/22   Type:     _X_PT   ___OT   ___SLP Medical Diagnosis  Secondary malignancy of lymph nodes of head, face and neck (H   PT Diagnosis  Shoulder Pain with Mobility Impairment Plan of Treatment  Frequency/Duration: every other week, reducing frequency as indicated  Certification date from 6/1/22 to 9/1/22     Goals:  Goal Identifier Shoulder AROM   Goal Description Patient to display >155 degrees shoulder flexion bilaterally pain free in order to reach into cabinets without being limited by pain.    Target Date 09/1/22   Date Met      Progress (detail required for progress note): Goal in progress: Demonstrating minimal pain with AROM overhead L shoulder, still limited with abduction due to significant scapular winging/dysfunction.  baseline: Shoulder flexion AROM L 134 degrees, R 142 degrees     Goal Identifier Carrying strength   Goal Description Patient to be able to walk with 30 pound of weight for 150 feet without reports of pain to be able to carry wood to/from home.    Target Date 09/1/22   Date Met      Progress (detail required for progress note): Goal in progress/met: Able to carry items > 20# but compensating to avoid pain     Goal Identifier HEP   Goal Description Patient to independently complete regular exercise program with correct mechanics in order to safely manage impairments upon discharge from skilled therapy.   Target Date 09/1/22   Date Met      Progress (detail required for progress note): Goal met/in progress: Tolerating prescribed HEP well.        Beginning/End Dates of Progress Note Reporting Period:  4/5/22 to 6/29/22    Progress Toward Goals:   Progress this reporting period: Mr. Willis has attended 5 PT visits addressing post op L shoulder/neck issues including scapular dysfunction and SCM/trapezius muscle atrophy/weakness likely related to spinal accessory nerve praxis.  He has maintained functional use and AROM of his L shoulder, although significant scapular winging and upward rotation/drift note. He has been instructed in an ongoing, progressive HEP, with plan for continued reassessment and progression as tolerated.    Client Self (Subjective) Report for Progress Note Reporting Period: Jones reports no signfiicant change in condition, ongoing neck soreness about incisional site, struggling with first bite syndrome, but appears to be improving slowly.  Denies much restriction with daily activity including ADLs, golf, and drumming/keyboard, despite ongoing weakness about L shoulder/neck post surgery.  Has been working on HEP most days, but does have some questions.                     I CERTIFY THE NEED FOR THESE SERVICES FURNISHED UNDER        THIS PLAN OF TREATMENT AND WHILE UNDER MY CARE     (Physician co-signature of this document indicates review and certification of the therapy plan).                Referring Provider: Dr. Summer Cruz, PT

## 2022-07-29 ENCOUNTER — OFFICE VISIT (OUTPATIENT)
Dept: PLASTIC SURGERY | Facility: CLINIC | Age: 67
End: 2022-07-29

## 2022-07-29 DIAGNOSIS — Z41.1 ELECTIVE PROCEDURE FOR UNACCEPTABLE COSMETIC APPEARANCE: Primary | ICD-10-CM

## 2022-07-29 NOTE — LETTER
7/29/2022       RE: Flaco Willis III  [address] [place] [address]     Dear Colleague,    Thank you for referring your patient, Flaco Willis III, to the [facility] at [facility]. Please see a copy of my visit note below.    Facial Plastic and Reconstructive Surgery    Procedure: Chemodenervation with Botulinum Toxin A  Indication: Undesirable Dynamic Rhytids  Injector: Anette Keane MD    Informed consent was obtained.  The skin was cleaned with antimicrobial solution and a topical ice was placed.     The patient was asked to systematically engage the muscles in the area to be injected. The tuberculin needles were used for subdermal injection and hemostasis was obtained with light digital pressure when needed. The skin was cleaned.     A total of 40 units were injected.  Botulinum toxin A type: Botox    Please see procedure log. Left cheek first bite syndrome and glabella.     The patient tolerated the procedure well and there were no complications. Post procedure care instructions were given to the patient.      Sincerely,    Anette Keane MD

## 2022-07-29 NOTE — LETTER
July 29, 2022  Re: Flaco Willis III  1955      Thank you so much for referring Flaoc Willis III to the [facility]. I had the pleasure of visiting with Flaco today.     Attached you will find a copy of my note. Please feel free to reach out to me with any questions, [phone].     Facial Plastic and Reconstructive Surgery    Procedure: Chemodenervation with Botulinum Toxin A  Indication: Undesirable Dynamic Rhytids  Injector: Anette Keane MD    Informed consent was obtained.  The skin was cleaned with antimicrobial solution and a topical ice was placed.     The patient was asked to systematically engage the muscles in the area to be injected. The tuberculin needles were used for subdermal injection and hemostasis was obtained with light digital pressure when needed. The skin was cleaned.     A total of 40 units were injected.  Botulinum toxin A type: Botox    Please see procedure log. Left cheek first bite syndrome and glabella.     The patient tolerated the procedure well and there were no complications. Post procedure care instructions were given to the patient.        Sincerely,    Anette Keane MD

## 2022-07-29 NOTE — PROGRESS NOTES
Facial Plastic and Reconstructive Surgery    Procedure: Chemodenervation with Botulinum Toxin A  Indication: Undesirable Dynamic Rhytids  Injector: Anette Keane MD    Informed consent was obtained.  The skin was cleaned with antimicrobial solution and a topical ice was placed.     The patient was asked to systematically engage the muscles in the area to be injected. The tuberculin needles were used for subdermal injection and hemostasis was obtained with light digital pressure when needed. The skin was cleaned.     A total of 40 units were injected.  Botulinum toxin A type: Botox    Please see procedure log. Left cheek first bite syndrome and glabella.     The patient tolerated the procedure well and there were no complications. Post procedure care instructions were given to the patient.

## 2022-08-12 NOTE — ANESTHESIA PROCEDURE NOTES
Airway       Patient location during procedure: OR       Procedure Start/Stop Times: 2/15/2022 1:20 PM and 2/15/2022 1:25 PM  Staff -        Anesthesiologist:  Clementine Ruiz MD       CRNA: Jermaine Tsai APRN CRNA       Performed By: CRNAIndications and Patient Condition       Indications for airway management: roberta-procedural       Induction type:intravenous       Mask difficulty assessment: 1 - vent by mask    Final Airway Details       Final airway type: endotracheal airway       Successful airway: ETT - single, Reinforced tube and Oral  Endotracheal Airway Details        ETT size (mm): 6.5       Cuffed: yes       Successful intubation technique: video laryngoscopy       VL Blade Size: MAC 3       Grade View of Cords: 1       Adjucts: stylet       Position: Right       Measured from: gums/teeth       Secured at (cm): 22       Bite block used: None    Post intubation assessment        Placement verified by: capnometry, equal breath sounds and chest rise        Number of attempts at approach: 1       Number of other approaches attempted: 0       Secured with: pink tape and sutures (Sutured in place by ENT )       Ease of procedure: easy       Dentition: Intact           yes yes yes

## 2022-09-21 ENCOUNTER — HOSPITAL ENCOUNTER (OUTPATIENT)
Dept: PHYSICAL THERAPY | Facility: CLINIC | Age: 67
Discharge: HOME | End: 2022-09-21
Payer: COMMERCIAL

## 2022-09-21 DIAGNOSIS — R29.898 WEAKNESS OF SHOULDER: Primary | ICD-10-CM

## 2022-09-21 PROCEDURE — 97110 THERAPEUTIC EXERCISES: CPT | Mod: GP | Performed by: PHYSICAL THERAPIST

## 2022-09-21 NOTE — PROGRESS NOTES
[facility]    OUTPATIENT PHYSICAL THERAPY  PLAN OF TREATMENT FOR OUTPATIENT REHABILITATION AND PROGRESS NOTE           Patient's Last Name, First Name, Flaco Watters    Date of Birth  1955   Provider's Name  [facility] Medical Record No.  [id number]    Onset Date  2/15/22 Start of Care Date  4/5/22   Type:     _X_PT   ___OT   ___SLP Medical Diagnosis  Secondary malignancy of lymph nodes of head, face and neck (H   PT Diagnosis  Shoulder Pain with Mobility Impairment Plan of Treatment  Frequency/Duration: every other week, reducing frequency as indicated (consider higher frequency if trialing e-stim)  Certification date from 9/2/22 to 12/10/22     Goals:  Goal Identifier Shoulder AROM   Goal Description Patient to display >155 degrees shoulder flexion bilaterally pain free in order to reach into cabinets without being limited by pain.    Target Date 12/10/22   Date Met      Progress (detail required for progress note): Goal in progress: No significant change/improvement in AROM of L shoulder noted, despite ongoing PT POC and HEP.  Current AROM L shoulder: flexion 131 deg and Abduction 130 deg.  Minimal/no pain reported. Significant compensatory movement pattern noted due to scapular/muscular dysfunction.     Goal Identifier Carrying strength   Goal Description Patient to be able to walk with 30 pound of weight for 150 feet without reports of pain to be able to carry wood to/from home.    Target Date 12/10/22   Date Met      Progress (detail required for progress note): Goal in progress/met: Able to carry items > 20# but compensating to avoid pain     Goal Identifier HEP   Goal Description Patient to independently complete regular exercise program with correct mechanics in order to safely manage impairments upon discharge from skilled therapy.   Target Date  12/10/22   Date Met      Progress (detail required for progress note): Goal in progress: Tolerating prescribed HEP well, though minimal progress in functional strength and muscle atrophy noted yet.       Beginning/End Dates of Progress Note Reporting Period:  6/30/22 to 9/21/22    Progress Toward Goals:   Patient has been seen x 1 PT visit during this timeframe, took a break from PT to focus on HEP and ongoing dental procedures.  Currently, he continues to demonstrate significant atrophy about L SCM and trapezius muscle group with impaired scapular mechanics post op.  He reports consistent HEP carryover and has good compensatory functional strength, although limited AROM overhead and functional use of L arm with any lifting motions.  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  Although, will continue to progress HEP and allow time for ongoing recovery and reassessment.    Client Self (Subjective) Report for Progress Note Reporting Period: Jones took a break from OP PT visits with multiple dental procedures and needing to reschedule.  Has been working on HEP regularly.  Noticing a mild sense of tingling R posterior neck and upper trapezius/scapular region.  Able to continue lift weights/exercise, drum, and continue normal daily activities without significant restriction.  Does not feel that he has noticed much of any functional improvement in condition, ongoing atrophy and compensations with overhead use of left arm due to weakness post op yet.      Objective Measurements:   Objective Measure: L shoulder complex Assessment  Details: Palpable and visible atrophy about L SCM, upper trapezius and mid/low trapezius.  Upward drift of scapula noted with winging, more pronounced with AROM of L shoulder flexion/abduction.    Objective Measure: MMT  Details: 4+ to 5/5 functional strength about L shoulder IR, ER, abduction, flexion, and extension without  pain reproduction.  Notable scapular winging with shoulder elevation.            I CERTIFY THE NEED FOR THESE SERVICES FURNISHED UNDER        THIS PLAN OF TREATMENT AND WHILE UNDER MY CARE     (Physician co-signature of this document indicates review and certification of the therapy plan).                Referring Provider: Dr. Summer Cruz, PT

## 2022-09-26 ENCOUNTER — THERAPY VISIT (OUTPATIENT)
Dept: PHYSICAL THERAPY | Facility: CLINIC | Age: 67
End: 2022-09-26
Payer: COMMERCIAL

## 2022-09-26 DIAGNOSIS — M25.512 CHRONIC LEFT SHOULDER PAIN: Primary | ICD-10-CM

## 2022-09-26 DIAGNOSIS — G89.29 CHRONIC LEFT SHOULDER PAIN: Primary | ICD-10-CM

## 2022-09-26 PROCEDURE — 97110 THERAPEUTIC EXERCISES: CPT | Mod: GP | Performed by: PHYSICAL THERAPIST

## 2022-09-26 PROCEDURE — 97530 THERAPEUTIC ACTIVITIES: CPT | Mod: GP | Performed by: PHYSICAL THERAPIST

## 2022-09-26 NOTE — PROGRESS NOTES
Dear Dr. Friedman:    I had the pleasure of seeing Flaco Willis III in follow-up today at the [facility].     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 He developed a left neck mass which was evaluated by his PCP and then referred to his local ENT. He had a CT neck performed on 11/16/2021 which showed a 2.5 x 2.0 cm neck mass. He had an U/S of the neck performed on 12/8/2021 which showed a 3.1 x 2.3 x 1.7 cm left neck mass.  He had an FNA of the neck mass on 12/8/2021 which showed p16+ SCC. He had a PET scan on 1/4/2022 which showed focal uptake in the left tonsil with no obvious CT correlate, 2 cm FDG avid left level IIA node, no distant disease. He saw medical oncology at [facility] and was recommended for radiation +/- chemotherapy, recommended against surgery due to the need for postoperative chemoradiation. He then transferred his care to the [place]. He saw Dr Friedman on 1/19/2022 who discussed various treatment options with him. He met with Dr Malone in January 2022 to discuss radiation. He decided to proceed with surgery. He was taken to the OR on 2/15/2022 for left radical tonsillectomy and left neck dissection (levels II-IV). Intraoperatively inferior tonsil margin was positive for SCC. A re-resection was performed. Final pathology demonstrated a p16+ SCC in the tonsil of 2.2 mm focus in the inferior margin, a 7 mm focus of SCC, DOI 1 mm in the inferior tonsil margin, and 2 mm of residual SCC in the re-resected inferior tonsil specimen, with closest margin 1.5 mm. There was a 2.6 cm metastatic node in level IIA, no IMANI, 18 remaining nodes negative.      Interval history:  He comes in today for follow-up. He was last seen in May 2022. 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 He was referred to Dr Shaquille Block for consideration of botox injections, saw her in June 2022 and had injections  in July 2022. He started weaning off his gabapentin in June 2022. He is seeing physical therapy. He had a tooth removed, had an implant placed, had issues with infection. The infection is mostly resolved. The botox has helped with the first bite, has some initial pain then it goes away. He is going to get some more injections. He says the scar tissue in his throat is getting better. It is still tight in the neck. He says that when he looks in his throat it is smaller on the left side than the right. He says that he gets used to it. He thinks the neck has become significantly better. He is seeing PT, has recently seen ortho. He notices some hollowing of the supraclavicular region.         MEDICATIONS:     Current Outpatient Medications   Medication Sig Dispense Refill     acetaminophen (TYLENOL) 325 MG tablet 2 tablets (650 mg) by Per NG tube route every 6 hours as needed for other (For optimal non-opioid multimodal pain management to improve pain control.) 30 tablet 0     aspirin 81 MG tablet Take 81 mg by mouth       atorvastatin (LIPITOR) 20 MG tablet Take 20 mg by mouth daily       busPIRone (BUSPAR) 10 MG tablet Take 10 mg by mouth 2 times daily        chlorhexidine (PERIDEX) 0.12 % solution Swish and spit a small amount after every meal and before bed 473 mL 0     Cholecalciferol (VITAMIN D3) 1000 UNITS CAPS Take 1,000 Units by mouth        dextroamphetamine (DEXEDRINE SPANSULE) 15 MG 24 hr capsule Take 15 mg by mouth        escitalopram (LEXAPRO) 10 MG tablet Take 10 mg by mouth daily       finasteride (PROPECIA) 1 MG tablet Take 1 mg by mouth daily        gabapentin (NEURONTIN) 100 MG capsule Take 3 capsules (300 mg) by mouth 2 times daily 180 capsule 0     LORazepam (ATIVAN) 2 MG/ML (HIGH CONC) oral solution 0.25-0.5 mLs (0.5-1 mg) by Oral or Feeding Tube route 2 times daily as needed for anxiety 7 mL 0     melatonin 3 MG tablet Take 1 tablet (3 mg) by mouth nightly as needed for sleep 14 tablet 1      mineral oil-hydrophilic petrolatum (AQUAPHOR) external ointment Apply a small amount of ointment to the incision and drain sites three times per day and as needed for crusting 99 g 0     naloxone (NARCAN) 4 MG/0.1ML nasal spray Spray 1 spray (4 mg) into one nostril alternating nostrils once as needed for opioid reversal every 2-3 minutes until assistance arrives 0.1 mL 0     Omega-3 Fatty Acids (FISH OIL PO)        senna-docusate (SENOKOT-S/PERICOLACE) 8.6-50 MG tablet 1 tablet by Per NG tube route 2 times daily 28 tablet 0     tadalafil (CIALIS) 20 MG tablet Take 20 mg by mouth       VITAMIN E MTC PO          ALLERGIES:    Allergies   Allergen Reactions     Augmentin Rash       HABITS/SOCIAL HISTORY:   Smoked on weekends previously several years ago. No chewing tobacco. Drinks beer on weekends.   .   Works in [employer] - does a lot of on [employer] work.     Social History     Socioeconomic History     Marital status:      Spouse name: Not on file     Number of children: Not on file     Years of education: Not on file     Highest education level: Not on file   Occupational History     Not on file   Tobacco Use     Smoking status: Former Smoker     Types: Cigars     Start date: 1973     Quit date: 1990     Years since quittin.7     Smokeless tobacco: Never Used   Substance and Sexual Activity     Alcohol use: Yes     Drug use: No     Sexual activity: Yes     Partners: Female     Birth control/protection: Pull-out method   Other Topics Concern     Not on file   Social History Narrative     Not on file     Social Determinants of Health     Financial Resource Strain: Not on file   Food Insecurity: Not on file   Transportation Needs: Not on file   Physical Activity: Not on file   Stress: Not on file   Social Connections: Not on file   Intimate Partner Violence: Not on file   Housing Stability: Not on file       PAST MEDICAL HISTORY:   Past Medical History:   Diagnosis Date     ADD  "(attention deficit disorder)      Anxiety      Crohn's disease (H)      Hyperlipidemia      Hypertension      Liposarcoma of lower extremity (H)         PAST SURGICAL HISTORY:   Past Surgical History:   Procedure Laterality Date     DISSECTION RADICAL NECK MODIFIED Left 2/15/2022    Procedure: left modified radical neck dissection.;  Surgeon: Summer Valladares MD;  Location: [facility] OR     EXCISE SOFT TISSUE TUMOR THIGH  11/21/2013    Procedure: EXCISE SOFT TISSUE TUMOR THIGH;  Excision of Tumor Bed Left Medial Thigh;  Surgeon: Seymour Jesus MD;  Location: [facility] OR     HERNIA REPAIR       TONSILLECTOMY ADULT Left 2/15/2022    Procedure: left radical tonsillectomy, nasogastric tube placement;  Surgeon: Summer Valladares MD;  Location: [facility] OR     tumor removal of thigh[       vocal cord surgery[       WRIST SURGERY         FAMILY HISTORY:  No family history on file.    REVIEW OF SYSTEMS:  12 point ROS was negative other than the symptoms noted above in the HPI.  Patient Supplied Answers to Review of Systems  [facility] ENT ROS 9/28/2022   Constitutional: Problems with sleep   Ears, Nose, Throat: Hoarseness         PHYSICAL EXAMINATION:   BP (!) 142/88   Temp 98.8  F (37.1  C)   Ht 1.727 m (5' 8\")   Wt 74.4 kg (164 lb)   SpO2 100%   BMI 24.94 kg/m     Appearance:   normal; NAD, age-appropriate appearance, well-developed, normal habitus   Communication:   normal; communicates verbally, normal voice quality   Head/Face:   inspection -  Normal; no scars or visible lesions   Salivary glands -  Normal size, no tenderness, swelling, or palpable masses   Facial strength -  Normal and symmetric    Skin:  normal, no rash   Ears:  auricle (AD) -  Normal  EAC (AD) - cerumen  TM (AD) - unable to visualize  auricle (AS) -  Normal  EAC (AS) - normal  TM (AS) - normal  Normal clinical speech reception   Nose:  Ext. inspection -  Normal  Internal Inspection -  Normal mucosa, septum, and turbinates   Nasopharynx:  normal mucosa, no " masses   Oral Cavity:  lips -  Normal mucosa, oral competence, and stoma size   Age-appropriate dentition, healthy gingival mucosa   Hard palate, buccal, floor of mouth mucosa normal   Tongue - normal movement, no lesions, posteriorly the tongue is tethered to the RMT/tonsil defect on the left, no palpable masses, some scar tissue posteriorly on palpation   Oropharynx:  mucosa -  Normal, no lesions  soft palate -  Normal, no lesions, no asymmetry, normal elevation  tonsils -  S/p left radical tonsillectomy with no masses palpable or on visual inspection, no mucosal lesions  Base of tongue - normal  Vallecula - clear   Hypopharynx:  Normal pyriform sinus and pharyngeal wall mucosa   No pooled secretions    Larynx:  Epiglottis, AE folds, false vocal cords, true vocal cords, arytenoids normal in appearance, bilaterally mobile cords    Neck: Well healed left neck incision  No palpable masses  Some tenderness to palpation in level IIB  Some hollowing in left supraclavicular fossa   Lymphatic:  no abnormal nodes   Cardiovascular:  warm, pink, well-perfused extremities without swelling, tenderness, or edema   Respiratory:  Normal respiratory effort, no stridor   Neuro/Psych.:  mood/affect -  normal  mental status -  normal       PROCEDURES:   Flexible fiberoptic laryngoscopy: Scope exam was indicated due to tonsil cancer. Verbal consent was obtained. The nasal cavity was prepped with an aerosolized solution of topical anesthetic and vasoconstrictive agent. The scope was passed through the anterior nasal cavity and advanced. Inspection of the nasopharynx revealed no gross abnormality. The base of tongue and vallecula are normal with the exception of the expected postoperative changes to the left base of tongue/tonsil without evidence of residual cancer. The epiglottis, AE folds, false cords, true cords, arytenoids are normal.  Inspection of the larynx revealed bilaterally mobile vocal cords. Pyriform sinuses are symmetric.  The airway is patent. Procedure tolerated well with no immediate complications noted.    RESULTS REVIEWED:   Reviewed notes from Dr Shaquille Block x 2    Care discussed with SLP    IMPRESSION AND PLAN:   Flaco Willis is a 67 year old man with a p16+ T1N1 SCC of the left tonsil. He underwent surgical resection with left radical tonsillectomy and left neck dissection on 2/15/2022. He was recommended for no further treatment.    He has no evidence of recurrent disease. Reassurance was provided about the normal appearing postsurgical changes to the anterior oropharynx.    We discussed that he should work with his PCP about anxiety issues.    He has first bite syndrome. He had botox injections with Dr Shaquille Block in July 2022, continue working with her.     He is seeing PT for his shoulder.      We will repeat imaging in November 2022.     I will see him back in 3 months with scans.    Thank you very much for the opportunity to participate in the care of your patient.      Summer Valladares MD  Otolaryngology- Head & Neck Surgery      This note was dictated with voice recognition software and then edited. Please excuse any unintentional errors.             CC:  Ashok Friedman MD  Department of Medical Oncology  [facility]      Alcides Malone MD  Department of Radiation Oncology  [facility]

## 2022-09-28 ENCOUNTER — THERAPY VISIT (OUTPATIENT)
Dept: SPEECH THERAPY | Facility: CLINIC | Age: 67
End: 2022-09-28
Payer: COMMERCIAL

## 2022-09-28 ENCOUNTER — OFFICE VISIT (OUTPATIENT)
Dept: OTOLARYNGOLOGY | Facility: CLINIC | Age: 67
End: 2022-09-28
Payer: COMMERCIAL

## 2022-09-28 VITALS
DIASTOLIC BLOOD PRESSURE: 88 MMHG | OXYGEN SATURATION: 100 % | TEMPERATURE: 98.8 F | SYSTOLIC BLOOD PRESSURE: 142 MMHG | WEIGHT: 164 LBS | HEIGHT: 68 IN | BODY MASS INDEX: 24.86 KG/M2

## 2022-09-28 DIAGNOSIS — C09.9 TONSILLAR CANCER (H): ICD-10-CM

## 2022-09-28 DIAGNOSIS — R13.12 OROPHARYNGEAL DYSPHAGIA: Primary | ICD-10-CM

## 2022-09-28 DIAGNOSIS — C10.9 MALIGNANT NEOPLASM OF OROPHARYNX (H): Primary | ICD-10-CM

## 2022-09-28 DIAGNOSIS — C77.0 SECONDARY MALIGNANCY OF LYMPH NODES OF HEAD, FACE AND NECK (H): ICD-10-CM

## 2022-09-28 PROCEDURE — 99213 OFFICE O/P EST LOW 20 MIN: CPT | Mod: 25 | Performed by: OTOLARYNGOLOGY

## 2022-09-28 PROCEDURE — 31575 DIAGNOSTIC LARYNGOSCOPY: CPT | Performed by: OTOLARYNGOLOGY

## 2022-09-28 PROCEDURE — 92526 ORAL FUNCTION THERAPY: CPT | Mod: GN | Performed by: SPEECH-LANGUAGE PATHOLOGIST

## 2022-09-28 ASSESSMENT — PAIN SCALES - GENERAL: PAINLEVEL: NO PAIN (0)

## 2022-09-28 NOTE — NURSING NOTE
"Chief Complaint   Patient presents with     RECHECK     3 month follow up      Blood pressure (!) 142/88, temperature 98.8  F (37.1  C), height 1.727 m (5' 8\"), weight 74.4 kg (164 lb), SpO2 100 %.    Coel Dickson LPN    "

## 2022-09-28 NOTE — LETTER
9/28/2022       RE: Flaco Willis III  [address] [place] [address]     Dear Colleague,    Thank you for referring your patient, Flaco Willis III, to the [facility] [place] at [facility]. Please see a copy of my visit note below.    Dear Dr. Friedman:    I had the pleasure of seeing Flaco Willis III in follow-up today at the [facility].     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 He developed a left neck mass which was evaluated by his PCP and then referred to his local ENT. He had a CT neck performed on 11/16/2021 which showed a 2.5 x 2.0 cm neck mass. He had an U/S of the neck performed on 12/8/2021 which showed a 3.1 x 2.3 x 1.7 cm left neck mass.  He had an FNA of the neck mass on 12/8/2021 which showed p16+ SCC. He had a PET scan on 1/4/2022 which showed focal uptake in the left tonsil with no obvious CT correlate, 2 cm FDG avid left level IIA node, no distant disease. He saw medical oncology at [facility] and was recommended for radiation +/- chemotherapy, recommended against surgery due to the need for postoperative chemoradiation. He then transferred his care to the [facility]. He saw Dr Friedman on 1/19/2022 who discussed various treatment options with him. He met with Dr Malone in January 2022 to discuss radiation. He decided to proceed with surgery. He was taken to the OR on 2/15/2022 for left radical tonsillectomy and left neck dissection (levels II-IV). Intraoperatively inferior tonsil margin was positive for SCC. A re-resection was performed. Final pathology demonstrated a p16+ SCC in the tonsil of 2.2 mm focus in the inferior margin, a 7 mm focus of SCC, DOI 1 mm in the inferior tonsil margin, and 2 mm of residual SCC in the re-resected inferior tonsil specimen, with closest margin 1.5 mm. There was a 2.6 cm  metastatic node in level IIA, no IMANI, 18 remaining nodes negative.      Interval history:  He comes in today for follow-up. He was last seen in May 2022. 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 He was referred to Dr Shaquille Block for consideration of botox injections, saw her in June 2022 and had injections in July 2022. He started weaning off his gabapentin in June 2022. He is seeing physical therapy. He had a tooth removed, had an implant placed, had issues with infection. The infection is mostly resolved. The botox has helped with the first bite, has some initial pain then it goes away. He is going to get some more injections. He says the scar tissue in his throat is getting better. It is still tight in the neck. He says that when he looks in his throat it is smaller on the left side than the right. He says that he gets used to it. He thinks the neck has become significantly better. He is seeing PT, has recently seen ortho. He notices some hollowing of the supraclavicular region.         MEDICATIONS:     Current Outpatient Medications   Medication Sig Dispense Refill     acetaminophen (TYLENOL) 325 MG tablet 2 tablets (650 mg) by Per NG tube route every 6 hours as needed for other (For optimal non-opioid multimodal pain management to improve pain control.) 30 tablet 0     aspirin 81 MG tablet Take 81 mg by mouth       atorvastatin (LIPITOR) 20 MG tablet Take 20 mg by mouth daily       busPIRone (BUSPAR) 10 MG tablet Take 10 mg by mouth 2 times daily        chlorhexidine (PERIDEX) 0.12 % solution Swish and spit a small amount after every meal and before bed 473 mL 0     Cholecalciferol (VITAMIN D3) 1000 UNITS CAPS Take 1,000 Units by mouth        dextroamphetamine (DEXEDRINE SPANSULE) 15 MG 24 hr capsule Take 15 mg by mouth        escitalopram (LEXAPRO) 10 MG tablet Take 10 mg by mouth daily       finasteride (PROPECIA) 1 MG tablet Take 1 mg by mouth daily        gabapentin (NEURONTIN) 100  MG capsule Take 3 capsules (300 mg) by mouth 2 times daily 180 capsule 0     LORazepam (ATIVAN) 2 MG/ML (HIGH CONC) oral solution 0.25-0.5 mLs (0.5-1 mg) by Oral or Feeding Tube route 2 times daily as needed for anxiety 7 mL 0     melatonin 3 MG tablet Take 1 tablet (3 mg) by mouth nightly as needed for sleep 14 tablet 1     mineral oil-hydrophilic petrolatum (AQUAPHOR) external ointment Apply a small amount of ointment to the incision and drain sites three times per day and as needed for crusting 99 g 0     naloxone (NARCAN) 4 MG/0.1ML nasal spray Spray 1 spray (4 mg) into one nostril alternating nostrils once as needed for opioid reversal every 2-3 minutes until assistance arrives 0.1 mL 0     Omega-3 Fatty Acids (FISH OIL PO)        senna-docusate (SENOKOT-S/PERICOLACE) 8.6-50 MG tablet 1 tablet by Per NG tube route 2 times daily 28 tablet 0     tadalafil (CIALIS) 20 MG tablet Take 20 mg by mouth       VITAMIN E MTC PO          ALLERGIES:    Allergies   Allergen Reactions     Augmentin Rash       HABITS/SOCIAL HISTORY:   Smoked on weekends previously several years ago. No chewing tobacco. Drinks beer on weekends.   .   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     Social History     Socioeconomic History     Marital status:      Spouse name: Not on file     Number of children: Not on file     Years of education: Not on file     Highest education level: Not on file   Occupational History     Not on file   Tobacco Use     Smoking status: Former Smoker     Types: Cigars     Start date: 1973     Quit date: 1990     Years since quittin.7     Smokeless tobacco: Never Used   Substance and Sexual Activity     Alcohol use: Yes     Drug use: No     Sexual activity: Yes     Partners: Female     Birth control/protection: Pull-out method   Other Topics Concern     Not on file   Social History Narrative     Not on file     Social Determinants of Health     Financial Resource  "Strain: Not on file   Food Insecurity: Not on file   Transportation Needs: Not on file   Physical Activity: Not on file   Stress: Not on file   Social Connections: Not on file   Intimate Partner Violence: Not on file   Housing Stability: Not on file       PAST MEDICAL HISTORY:   Past Medical History:   Diagnosis Date     ADD (attention deficit disorder)      Anxiety      Crohn's disease (H)      Hyperlipidemia      Hypertension      Liposarcoma of lower extremity (H)         PAST SURGICAL HISTORY:   Past Surgical History:   Procedure Laterality Date     DISSECTION RADICAL NECK MODIFIED Left 2/15/2022    Procedure: left modified radical neck dissection.;  Surgeon: Summer Valladares MD;  Location: [facility] OR     EXCISE SOFT TISSUE TUMOR THIGH  11/21/2013    Procedure: EXCISE SOFT TISSUE TUMOR THIGH;  Excision of Tumor Bed Left Medial Thigh;  Surgeon: Seymour Jesus MD;  Location: [facility] OR     HERNIA REPAIR       TONSILLECTOMY ADULT Left 2/15/2022    Procedure: left radical tonsillectomy, nasogastric tube placement;  Surgeon: Summer Valladares MD;  Location: [facility] OR     tumor removal of thigh[       vocal cord surgery[       WRIST SURGERY         FAMILY HISTORY:  No family history on file.    REVIEW OF SYSTEMS:  12 point ROS was negative other than the symptoms noted above in the HPI.  Patient Supplied Answers to Review of Systems  [facility] ENT ROS 9/28/2022   Constitutional: Problems with sleep   Ears, Nose, Throat: Hoarseness         PHYSICAL EXAMINATION:   BP (!) 142/88   Temp 98.8  F (37.1  C)   Ht 1.727 m (5' 8\")   Wt 74.4 kg (164 lb)   SpO2 100%   BMI 24.94 kg/m     Appearance:   normal; NAD, age-appropriate appearance, well-developed, normal habitus   Communication:   normal; communicates verbally, normal voice quality   Head/Face:   inspection -  Normal; no scars or visible lesions   Salivary glands -  Normal size, no tenderness, swelling, or palpable masses   Facial strength -  Normal and symmetric    Skin:  " normal, no rash   Ears:  auricle (AD) -  Normal  EAC (AD) - cerumen  TM (AD) - unable to visualize  auricle (AS) -  Normal  EAC (AS) - normal  TM (AS) - normal  Normal clinical speech reception   Nose:  Ext. inspection -  Normal  Internal Inspection -  Normal mucosa, septum, and turbinates   Nasopharynx:  normal mucosa, no masses   Oral Cavity:  lips -  Normal mucosa, oral competence, and stoma size   Age-appropriate dentition, healthy gingival mucosa   Hard palate, buccal, floor of mouth mucosa normal   Tongue - normal movement, no lesions, posteriorly the tongue is tethered to the RMT/tonsil defect on the left, no palpable masses, some scar tissue posteriorly on palpation   Oropharynx:  mucosa -  Normal, no lesions  soft palate -  Normal, no lesions, no asymmetry, normal elevation  tonsils -  S/p left radical tonsillectomy with no masses palpable or on visual inspection, no mucosal lesions  Base of tongue - normal  Vallecula - clear   Hypopharynx:  Normal pyriform sinus and pharyngeal wall mucosa   No pooled secretions    Larynx:  Epiglottis, AE folds, false vocal cords, true vocal cords, arytenoids normal in appearance, bilaterally mobile cords    Neck: Well healed left neck incision  No palpable masses  Some tenderness to palpation in level IIB  Some hollowing in left supraclavicular fossa   Lymphatic:  no abnormal nodes   Cardiovascular:  warm, pink, well-perfused extremities without swelling, tenderness, or edema   Respiratory:  Normal respiratory effort, no stridor   Neuro/Psych.:  mood/affect -  normal  mental status -  normal       PROCEDURES:   Flexible fiberoptic laryngoscopy: Scope exam was indicated due to tonsil cancer. Verbal consent was obtained. The nasal cavity was prepped with an aerosolized solution of topical anesthetic and vasoconstrictive agent. The scope was passed through the anterior nasal cavity and advanced. Inspection of the nasopharynx revealed no gross abnormality. The base of tongue  and vallecula are normal with the exception of the expected postoperative changes to the left base of tongue/tonsil without evidence of residual cancer. The epiglottis, AE folds, false cords, true cords, arytenoids are normal.  Inspection of the larynx revealed bilaterally mobile vocal cords. Pyriform sinuses are symmetric. The airway is patent. Procedure tolerated well with no immediate complications noted.    RESULTS REVIEWED:   Reviewed notes from Dr Shaquille Block x 2    Care discussed with SLP    IMPRESSION AND PLAN:   Flaco Willis is a 67 year old man with a p16+ T1N1 SCC of the left tonsil. He underwent surgical resection with left radical tonsillectomy and left neck dissection on 2/15/2022. He was recommended for no further treatment.    He has no evidence of recurrent disease. Reassurance was provided about the normal appearing postsurgical changes to the anterior oropharynx.    We discussed that he should work with his PCP about anxiety issues.    He has first bite syndrome. He had botox injections with Dr Shaquille Block in July 2022, continue working with her.     He is seeing PT for his shoulder.      We will repeat imaging in November 2022.     I will see him back in 3 months with scans.    Thank you very much for the opportunity to participate in the care of your patient.      Summer Valladares MD  Otolaryngology- Head & Neck Surgery      This note was dictated with voice recognition software and then edited. Please excuse any unintentional errors.     CC:  Ashok Friedman MD  Department of Medical Oncology  [facility]      Alcides Malone MD  Department of Radiation Oncology  [facility]

## 2022-10-14 ENCOUNTER — OFFICE VISIT (OUTPATIENT)
Dept: PLASTIC SURGERY | Facility: CLINIC | Age: 67
End: 2022-10-14

## 2022-10-14 DIAGNOSIS — C09.9 TONSILLAR CANCER (H): Primary | ICD-10-CM

## 2022-10-14 DIAGNOSIS — R51.9 FACIAL PAIN: ICD-10-CM

## 2022-10-14 NOTE — LETTER
10/14/2022       RE: Flaco Willis III  [address] [place] [address]     Dear Colleague,    Thank you for referring your patient, Flaco Willis III, to the [facility] at [facility]. Please see a copy of my visit note below.    Facial Plastic and Reconstructive Surgery     Procedure: Chemodenervation with Botulinum Toxin A  Indication: First bite syndrome and glabellar rhytids   Injector: Anette Keane MD     Informed consent was obtained.  The skin was cleaned with antimicrobial solution and a topical ice was placed.      The patient was asked to systematically engage the muscles in the area to be injected. The tuberculin needles were used for subdermal injection and hemostasis was obtained with light digital pressure when needed. The skin was cleaned.      A total of 40 units were injected.  Botulinum toxin A type: Botox     Please see procedure log. Left cheek first bite syndrome and glabella.      The patient tolerated the procedure well and there were no complications. Post procedure care instructions were given to the patient.      Sincerely,    Anette Keane MD

## 2022-10-14 NOTE — LETTER
October 14, 2022  Re: Flaco Willis III  1955    Dear Dr. Valladares,    Thank you so much for referring Fl[facility] Willis III to the [facility]. I had the pleasure of visiting with Flaco today.     Attached you will find a copy of my note. Please feel free to reach out to me with any questions, [phone].     Facial Plastic and Reconstructive Surgery     Procedure: Chemodenervation with Botulinum Toxin A  Indication: First bite syndrome and glabellar rhytids   Injector: Anette Keane MD     Informed consent was obtained.  The skin was cleaned with antimicrobial solution and a topical ice was placed.      The patient was asked to systematically engage the muscles in the area to be injected. The tuberculin needles were used for subdermal injection and hemostasis was obtained with light digital pressure when needed. The skin was cleaned.      A total of 40 units were injected.  Botulinum toxin A type: Botox     Please see procedure log. Left cheek first bite syndrome and glabella.      The patient tolerated the procedure well and there were no complications. Post procedure care instructions were given to the patient.      Your trust in our practice and care is much appreciated.    Sincerely,  Anette Keane MD

## 2022-10-14 NOTE — PROGRESS NOTES
Facial Plastic and Reconstructive Surgery     Procedure: Chemodenervation with Botulinum Toxin A  Indication: First bite syndrome and glabellar rhytids   Injector: Anette Keane MD     Informed consent was obtained.  The skin was cleaned with antimicrobial solution and a topical ice was placed.      The patient was asked to systematically engage the muscles in the area to be injected. The tuberculin needles were used for subdermal injection and hemostasis was obtained with light digital pressure when needed. The skin was cleaned.      A total of 40 units were injected.  Botulinum toxin A type: Botox     Please see procedure log. Left cheek first bite syndrome and glabella.      The patient tolerated the procedure well and there were no complications. Post procedure care instructions were given to the patient.

## 2022-10-16 ENCOUNTER — HEALTH MAINTENANCE LETTER (OUTPATIENT)
Age: 67
End: 2022-10-16

## 2022-10-24 ENCOUNTER — THERAPY VISIT (OUTPATIENT)
Dept: PHYSICAL THERAPY | Facility: CLINIC | Age: 67
End: 2022-10-24
Payer: COMMERCIAL

## 2022-10-24 DIAGNOSIS — M25.512 ACUTE PAIN OF LEFT SHOULDER: Primary | ICD-10-CM

## 2022-10-24 PROCEDURE — 97530 THERAPEUTIC ACTIVITIES: CPT | Mod: GP | Performed by: PHYSICAL THERAPIST

## 2022-10-24 PROCEDURE — 97110 THERAPEUTIC EXERCISES: CPT | Mod: GP | Performed by: PHYSICAL THERAPIST

## 2022-10-25 NOTE — PROGRESS NOTES
Subjective:  HPI  Physical Exam                    Objective:  System    Physical Exam    General     ROS    Assessment/Plan:    DISCHARGE REPORT    Progress reporting period is from 4-5-22 to 10-24-22.       SUBJECTIVE  Subjective changes noted by patient:   Pt. has made good progress over time with his L shoulder despite the complete atrophy of his upper trapezius following cancer surgery. Initially pt. had difficulty lifting and reaching overhead in all directions. Now pt. can lift and reach overhead without pain. 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 At this point there appears to be nothing else to be gained from continued PT visits. Pt. will continue with his HEP at home and the gym.      Current pain level is  1/10.     Previous pain level was  5/10  .   Changes in function:  Yes (See Goal flowsheet attached for changes in current functional level)  Adverse reaction to treatment or activity: None    OBJECTIVE  Changes noted in objective findings:  AROM L sh flex 152 with winging; abd 128 with winging. Strength L sh flex 4+/5; abd 4+/5; ER 5/5; IR 5/5     ASSESSMENT/PLAN  Updated problem list and treatment plan: Diagnosis 1:  L shoulder pain/weakness  Pain -  electric stimulation, manual therapy, self management and education  Decreased ROM/flexibility - manual therapy and therapeutic exercise  Decreased strength - therapeutic exercise and therapeutic activities  Impaired muscle performance - neuro re-education  Decreased function - therapeutic activities  STG/LTGs have been met or progress has been made towards goals:  Yes (See Goal flow sheet completed today.)  Assessment of Progress: The patient has met all of their long term goals.  Self Management Plans:  Patient is independent in a home treatment program.  Patient is independent in self management of symptoms.  I have re-evaluated this patient and find that the nature, scope,  duration and intensity of the therapy is appropriate for the medical condition of the patient.  Edward continues to require the following intervention to meet STG and LTG's:  PT intervention is no longer required to meet STG/LTG.    Recommendations:  This patient is ready to be discharged from therapy and continue their home treatment program.    Please refer to the daily flowsheet for treatment today, total treatment time and time spent performing 1:1 timed codes.

## 2022-11-08 NOTE — TUMOR CONFERENCE
Tumor Conference Information  Tumor Conference: ENT  Specialties Present: Medical oncology, Radiation oncology, Pathology, Radiology, Surgery  Patient Status: Prospective  Stage: T1N1M0  Treatment to Date: Surgery  Clinical Trials: Not discussed  Genetic Testing Discussed/Recommended?: No  Supportive Care Services Discussed/Recommended?: No  Recommended Plan: Follows evidence-based guidelines  Did the review exceed 30 minutes?: did not           Documentation / Disclaimer Cancer Tumor Board Note  Cancer tumor board recommendations do not override what is determined to be reasonable care and treatment, which is dependent on the circumstances of a patient's case; the patient's medical, social, and personal concerns; and the clinical judgment of the oncologist [physician].

## 2022-12-12 NOTE — PROGRESS NOTES
Dear Dr. Friedman:    I had the pleasure of seeing Flaco Willis III in follow-up today at the [facility].     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 He developed a left neck mass which was evaluated by his PCP and then referred to his local ENT. He had a CT neck performed on 11/16/2021 which showed a 2.5 x 2.0 cm neck mass. He had an U/S of the neck performed on 12/8/2021 which showed a 3.1 x 2.3 x 1.7 cm left neck mass.  He had an FNA of the neck mass on 12/8/2021 which showed p16+ SCC. He had a PET scan on 1/4/2022 which showed focal uptake in the left tonsil with no obvious CT correlate, 2 cm FDG avid left level IIA node, no distant disease. He saw medical oncology at [facility] and was recommended for radiation +/- chemotherapy, recommended against surgery due to the need for postoperative chemoradiation. He then transferred his care to the [facility]. He saw Dr Friedman on 1/19/2022 who discussed various treatment options with him. He met with Dr Malone in January 2022 to discuss radiation. He decided to proceed with surgery. He was taken to the OR on 2/15/2022 for left radical tonsillectomy and left neck dissection (levels II-IV). Intraoperatively inferior tonsil margin was positive for SCC. A re-resection was performed. Final pathology demonstrated a p16+ SCC in the tonsil of 2.2 mm focus in the inferior margin, a 7 mm focus of SCC, DOI 1 mm in the inferior tonsil margin, and 2 mm of residual SCC in the re-resected inferior tonsil specimen, with closest margin 1.5 mm. There was a 2.6 cm metastatic node in level IIA, no IMANI, 18 remaining nodes negative.      Interval history:  He comes in today for follow-up. He was last seen in September 2022. He had repeat botox for his first bite in October 2022 with Dr Shaquille Block. He has been discharged from . He comes in with repeat scans. He says things are ok. He wants  to carmina. He has tightness in the back of his throat which is slowly improving with time. The first bite is still there. He says he had some drooping on the left side of his mouth in delayed fashion after the last botox injection. It is now just symptomatic with the first bite as opposed to every bite like he was experiencing previously. He is still concerned about about a lump on his shoulder region. It bothers him that no one knows what it is. He is doing more aggressive working out and thinks things are improving.       MEDICATIONS:     Current Outpatient Medications   Medication Sig Dispense Refill     acetaminophen (TYLENOL) 325 MG tablet 2 tablets (650 mg) by Per NG tube route every 6 hours as needed for other (For optimal non-opioid multimodal pain management to improve pain control.) 30 tablet 0     aspirin 81 MG tablet Take 81 mg by mouth       atorvastatin (LIPITOR) 20 MG tablet Take 20 mg by mouth daily       busPIRone (BUSPAR) 10 MG tablet Take 10 mg by mouth 2 times daily        chlorhexidine (PERIDEX) 0.12 % solution Swish and spit a small amount after every meal and before bed 473 mL 0     Cholecalciferol (VITAMIN D3) 1000 UNITS CAPS Take 1,000 Units by mouth        dextroamphetamine (DEXEDRINE SPANSULE) 15 MG 24 hr capsule Take 15 mg by mouth        escitalopram (LEXAPRO) 10 MG tablet Take 10 mg by mouth daily       finasteride (PROPECIA) 1 MG tablet Take 1 mg by mouth daily        gabapentin (NEURONTIN) 100 MG capsule Take 3 capsules (300 mg) by mouth 2 times daily 180 capsule 0     LORazepam (ATIVAN) 2 MG/ML (HIGH CONC) oral solution 0.25-0.5 mLs (0.5-1 mg) by Oral or Feeding Tube route 2 times daily as needed for anxiety 7 mL 0     melatonin 3 MG tablet Take 1 tablet (3 mg) by mouth nightly as needed for sleep 14 tablet 1     mineral oil-hydrophilic petrolatum (AQUAPHOR) external ointment Apply a small amount of ointment to the incision and drain sites three times per day and as needed for  crusting 99 g 0     naloxone (NARCAN) 4 MG/0.1ML nasal spray Spray 1 spray (4 mg) into one nostril alternating nostrils once as needed for opioid reversal every 2-3 minutes until assistance arrives 0.1 mL 0     Omega-3 Fatty Acids (FISH OIL PO)        senna-docusate (SENOKOT-S/PERICOLACE) 8.6-50 MG tablet 1 tablet by Per NG tube route 2 times daily 28 tablet 0     tadalafil (CIALIS) 20 MG tablet Take 20 mg by mouth       VITAMIN E MTC PO          ALLERGIES:    Allergies   Allergen Reactions     Augmentin Rash       HABITS/SOCIAL HISTORY:   Smoked on weekends previously several years ago. No chewing tobacco. Drinks beer on weekends.   .   Works in [employer] - does a lot of on [employer] work.     Social History     Socioeconomic History     Marital status:      Spouse name: Not on file     Number of children: Not on file     Years of education: Not on file     Highest education level: Not on file   Occupational History     Not on file   Tobacco Use     Smoking status: Former     Types: Cigars     Start date: 1973     Quit date: 1990     Years since quittin.9     Smokeless tobacco: Never   Substance and Sexual Activity     Alcohol use: Yes     Drug use: No     Sexual activity: Yes     Partners: Female     Birth control/protection: Pull-out method   Other Topics Concern     Not on file   Social History Narrative     Not on file     Social Determinants of Health     Financial Resource Strain: Not on file   Food Insecurity: Not on file   Transportation Needs: Not on file   Physical Activity: Not on file   Stress: Not on file   Social Connections: Not on file   Intimate Partner Violence: Not on file   Housing Stability: Not on file       PAST MEDICAL HISTORY:   Past Medical History:   Diagnosis Date     ADD (attention deficit disorder)      Anxiety      Crohn's disease (H)      Hyperlipidemia      Hypertension      Liposarcoma of lower extremity (H)         PAST SURGICAL HISTORY:   Past  "Surgical History:   Procedure Laterality Date     DISSECTION RADICAL NECK MODIFIED Left 2/15/2022    Procedure: left modified radical neck dissection.;  Surgeon: Summer Valladares MD;  Location: [facility] OR     EXCISE SOFT TISSUE TUMOR THIGH  11/21/2013    Procedure: EXCISE SOFT TISSUE TUMOR THIGH;  Excision of Tumor Bed Left Medial Thigh;  Surgeon: Seymour Jesus MD;  Location: [facility] OR     HERNIA REPAIR       TONSILLECTOMY ADULT Left 2/15/2022    Procedure: left radical tonsillectomy, nasogastric tube placement;  Surgeon: Summer Valladares MD;  Location: [facility] OR     tumor removal of thigh[       vocal cord surgery[       WRIST SURGERY         FAMILY HISTORY:  No family history on file.    REVIEW OF SYSTEMS:  12 point ROS was negative other than the symptoms noted above in the HPI.  Patient Supplied Answers to Review of Systems  [facility] ROS 9/28/2022   Constitutional: Problems with sleep   Ears, Nose, Throat: Hoarseness         PHYSICAL EXAMINATION:   BP (!) 161/109 (BP Location: Right arm, Patient Position: Sitting, Cuff Size: Adult Regular)   Ht 1.727 m (5' 8\")   Wt 76.7 kg (169 lb)   BMI 25.70 kg/m     Appearance:   normal; NAD, age-appropriate appearance, well-developed, normal habitus   Communication:   normal; communicates verbally, normal voice quality   Head/Face:   inspection -  Normal; no scars or visible lesions   Salivary glands -  Normal size, no tenderness, swelling, or palpable masses   Facial strength -  Normal and symmetric    Skin:  normal, no rash   Ears:  auricle (AD) -  Normal  EAC (AD) - cerumen  TM (AD) - unable to visualize  auricle (AS) -  Normal  EAC (AS) - normal  TM (AS) - normal  Normal clinical speech reception   Nose:  Ext. inspection -  Normal  Internal Inspection -  Normal mucosa, septum, and turbinates   Nasopharynx:  normal mucosa, no masses   Oral Cavity:  lips -  Normal mucosa, oral competence, and stoma size   Age-appropriate dentition, healthy gingival mucosa   Hard palate, " buccal, floor of mouth mucosa normal   Tongue - normal movement, no lesions, posteriorly the tongue is tethered to the RMT/tonsil defect on the left, no palpable masses, some scar tissue posteriorly on palpation   Oropharynx:  mucosa -  Normal, no lesions  soft palate -  Normal, no lesions, no asymmetry, normal elevation  tonsils -  S/p left radical tonsillectomy with no masses palpable or on visual inspection, no mucosal lesions  Base of tongue - normal  Vallecula - clear   Hypopharynx:  Normal pyriform sinus and pharyngeal wall mucosa   No pooled secretions    Larynx:  Epiglottis, AE folds, false vocal cords, true vocal cords, arytenoids normal in appearance, bilaterally mobile cords    Neck: Well healed left neck incision  No palpable masses  Some tenderness to palpation in level IIB - stable  Some mild hollowing in left supraclavicular fossa   Lymphatic:  no abnormal nodes   Cardiovascular:  warm, pink, well-perfused extremities without swelling, tenderness, or edema   Respiratory:  Normal respiratory effort, no stridor   Neuro/Psych.:  mood/affect -  normal  mental status -  normal       PROCEDURES:   Flexible fiberoptic laryngoscopy: Scope exam was indicated due to tonsil cancer. Verbal consent was obtained. The nasal cavity was prepped with an aerosolized solution of topical anesthetic and vasoconstrictive agent. The scope was passed through the anterior nasal cavity and advanced. Inspection of the nasopharynx revealed no gross abnormality. The base of tongue and vallecula are normal with the exception of the expected postoperative changes to the left base of tongue/tonsil without evidence of residual cancer. The epiglottis, AE folds, false cords, true cords, arytenoids are normal.  Inspection of the larynx revealed bilaterally mobile vocal cords. Pyriform sinuses are symmetric. The airway is patent. Procedure tolerated well with no immediate complications noted.              RESULTS REVIEWED:   Reviewed  notes from Dr Shaquille Block, PT    CT neck and chest imaging independently reviewed     CT neck and chest reports reviewed       IMPRESSION AND PLAN:   Flaco Willis is a 67 year old man with a p16+ T1N1 SCC of the left tonsil. He underwent surgical resection with left radical tonsillectomy and left neck dissection on 2/15/2022. He was recommended for no further treatment.    He has no evidence of recurrent disease.     We spent time again discussing his left shoulder and the concerns related to it.    He has first bite syndrome. He had botox injections with Dr Shaquille Block in October 2022, will continue to follow-up with her.     He had repeat imaging today. Results were discussed with him today.    I will see him back in 3 months.      Thank you very much for the opportunity to participate in the care of your patient.      Summer Valladares MD  Otolaryngology- Head & Neck Surgery      This note was dictated with voice recognition software and then edited. Please excuse any unintentional errors.             CC:  Ashok Friedman MD  Department of Medical Oncology  [facility]      Alcides Malone MD  Department of Radiation Oncology  [facility]

## 2022-12-14 ENCOUNTER — ANCILLARY PROCEDURE (OUTPATIENT)
Dept: CT IMAGING | Facility: CLINIC | Age: 67
End: 2022-12-14
Attending: OTOLARYNGOLOGY
Payer: COMMERCIAL

## 2022-12-14 ENCOUNTER — OFFICE VISIT (OUTPATIENT)
Dept: OTOLARYNGOLOGY | Facility: CLINIC | Age: 67
End: 2022-12-14
Payer: COMMERCIAL

## 2022-12-14 VITALS
HEIGHT: 68 IN | BODY MASS INDEX: 25.61 KG/M2 | SYSTOLIC BLOOD PRESSURE: 161 MMHG | DIASTOLIC BLOOD PRESSURE: 109 MMHG | WEIGHT: 169 LBS

## 2022-12-14 DIAGNOSIS — C10.9 MALIGNANT NEOPLASM OF OROPHARYNX (H): ICD-10-CM

## 2022-12-14 DIAGNOSIS — C77.0 SECONDARY MALIGNANCY OF LYMPH NODES OF HEAD, FACE AND NECK (H): ICD-10-CM

## 2022-12-14 DIAGNOSIS — C09.9 TONSILLAR CANCER (H): Primary | ICD-10-CM

## 2022-12-14 PROCEDURE — 71260 CT THORAX DX C+: CPT | Mod: GC | Performed by: RADIOLOGY

## 2022-12-14 PROCEDURE — 70491 CT SOFT TISSUE NECK W/DYE: CPT | Mod: GC | Performed by: STUDENT IN AN ORGANIZED HEALTH CARE EDUCATION/TRAINING PROGRAM

## 2022-12-14 PROCEDURE — 31575 DIAGNOSTIC LARYNGOSCOPY: CPT | Performed by: OTOLARYNGOLOGY

## 2022-12-14 PROCEDURE — 99214 OFFICE O/P EST MOD 30 MIN: CPT | Mod: 25 | Performed by: OTOLARYNGOLOGY

## 2022-12-14 RX ORDER — IOPAMIDOL 755 MG/ML
83 INJECTION, SOLUTION INTRAVASCULAR ONCE
Status: COMPLETED | OUTPATIENT
Start: 2022-12-14 | End: 2022-12-14

## 2022-12-14 RX ADMIN — IOPAMIDOL 83 ML: 755 INJECTION, SOLUTION INTRAVASCULAR at 14:56

## 2022-12-14 ASSESSMENT — PAIN SCALES - GENERAL: PAINLEVEL: NO PAIN (0)

## 2022-12-14 NOTE — LETTER
12/14/2022       RE: Flaco Willis III  [address] [place] [address]     Dear Colleague,    Thank you for referring your patient, Flaco Willis III, to the [facility] [place] [facility]. Please see a copy of my visit note below.    Dear Dr. Friedman:    I had the pleasure of seeing Flaco Willis III in follow-up today at the [facility].     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 He developed a left neck mass which was evaluated by his PCP and then referred to his local ENT. He had a CT neck performed on 11/16/2021 which showed a 2.5 x 2.0 cm neck mass. He had an U/S of the neck performed on 12/8/2021 which showed a 3.1 x 2.3 x 1.7 cm left neck mass.  He had an FNA of the neck mass on 12/8/2021 which showed p16+ SCC. He had a PET scan on 1/4/2022 which showed focal uptake in the left tonsil with no obvious CT correlate, 2 cm FDG avid left level IIA node, no distant disease. He saw medical oncology at [facility] and was recommended for radiation +/- chemotherapy, recommended against surgery due to the need for postoperative chemoradiation. He then transferred his care to the [facility]. He saw Dr Friedman on 1/19/2022 who discussed various treatment options with him. He met with Dr Malone in January 2022 to discuss radiation. He decided to proceed with surgery. He was taken to the OR on 2/15/2022 for left radical tonsillectomy and left neck dissection (levels II-IV). Intraoperatively inferior tonsil margin was positive for SCC. A re-resection was performed. Final pathology demonstrated a p16+ SCC in the tonsil of 2.2 mm focus in the inferior margin, a 7 mm focus of SCC, DOI 1 mm in the inferior tonsil margin, and 2 mm of residual SCC in the re-resected inferior tonsil specimen, with closest margin 1.5 mm. There was a 2.6 cm  metastatic node in level IIA, no IMANI, 18 remaining nodes negative.      Interval history:  He comes in today for follow-up. He was last seen in September 2022. He had repeat botox for his first bite in October 2022 with Dr Shaquille Block. He has been discharged from PT. He comes in with repeat scans. He says things are ok. He wants to busier. He has tightness in the back of his throat which is slowly improving with time. The first bite is still there. He says he had some drooping on the left side of his mouth in delayed fashion after the last botox injection. It is now just symptomatic with the first bite as opposed to every bite like he was experiencing previously. He is still concerned about about a lump on his shoulder region. It bothers him that no one knows what it is. He is doing more aggressive working out and thinks things are improving.       MEDICATIONS:     Current Outpatient Medications   Medication Sig Dispense Refill     acetaminophen (TYLENOL) 325 MG tablet 2 tablets (650 mg) by Per NG tube route every 6 hours as needed for other (For optimal non-opioid multimodal pain management to improve pain control.) 30 tablet 0     aspirin 81 MG tablet Take 81 mg by mouth       atorvastatin (LIPITOR) 20 MG tablet Take 20 mg by mouth daily       busPIRone (BUSPAR) 10 MG tablet Take 10 mg by mouth 2 times daily        chlorhexidine (PERIDEX) 0.12 % solution Swish and spit a small amount after every meal and before bed 473 mL 0     Cholecalciferol (VITAMIN D3) 1000 UNITS CAPS Take 1,000 Units by mouth        dextroamphetamine (DEXEDRINE SPANSULE) 15 MG 24 hr capsule Take 15 mg by mouth        escitalopram (LEXAPRO) 10 MG tablet Take 10 mg by mouth daily       finasteride (PROPECIA) 1 MG tablet Take 1 mg by mouth daily        gabapentin (NEURONTIN) 100 MG capsule Take 3 capsules (300 mg) by mouth 2 times daily 180 capsule 0     LORazepam (ATIVAN) 2 MG/ML (HIGH CONC) oral solution 0.25-0.5 mLs (0.5-1 mg) by Oral or  Feeding Tube route 2 times daily as needed for anxiety 7 mL 0     melatonin 3 MG tablet Take 1 tablet (3 mg) by mouth nightly as needed for sleep 14 tablet 1     mineral oil-hydrophilic petrolatum (AQUAPHOR) external ointment Apply a small amount of ointment to the incision and drain sites three times per day and as needed for crusting 99 g 0     naloxone (NARCAN) 4 MG/0.1ML nasal spray Spray 1 spray (4 mg) into one nostril alternating nostrils once as needed for opioid reversal every 2-3 minutes until assistance arrives 0.1 mL 0     Omega-3 Fatty Acids (FISH OIL PO)        senna-docusate (SENOKOT-S/PERICOLACE) 8.6-50 MG tablet 1 tablet by Per NG tube route 2 times daily 28 tablet 0     tadalafil (CIALIS) 20 MG tablet Take 20 mg by mouth       VITAMIN E MTC PO          ALLERGIES:    Allergies   Allergen Reactions     Augmentin Rash       HABITS/SOCIAL HISTORY:   Smoked on weekends previously several years ago. No chewing tobacco. Drinks beer on weekends.   .   Works in [employer] - does a lot of on [employer] work.     Social History     Socioeconomic History     Marital status:      Spouse name: Not on file     Number of children: Not on file     Years of education: Not on file     Highest education level: Not on file   Occupational History     Not on file   Tobacco Use     Smoking status: Former     Types: Cigars     Start date: 1973     Quit date: 1990     Years since quittin.9     Smokeless tobacco: Never   Substance and Sexual Activity     Alcohol use: Yes     Drug use: No     Sexual activity: Yes     Partners: Female     Birth control/protection: Pull-out method   Other Topics Concern     Not on file   Social History Narrative     Not on file     Social Determinants of Health     Financial Resource Strain: Not on file   Food Insecurity: Not on file   Transportation Needs: Not on file   Physical Activity: Not on file   Stress: Not on file   Social Connections: Not on file  "  Intimate Partner Violence: Not on file   Housing Stability: Not on file       PAST MEDICAL HISTORY:   Past Medical History:   Diagnosis Date     ADD (attention deficit disorder)      Anxiety      Crohn's disease (H)      Hyperlipidemia      Hypertension      Liposarcoma of lower extremity (H)         PAST SURGICAL HISTORY:   Past Surgical History:   Procedure Laterality Date     DISSECTION RADICAL NECK MODIFIED Left 2/15/2022    Procedure: left modified radical neck dissection.;  Surgeon: Summer Valladares MD;  Location: [facility] OR     EXCISE SOFT TISSUE TUMOR THIGH  11/21/2013    Procedure: EXCISE SOFT TISSUE TUMOR THIGH;  Excision of Tumor Bed Left Medial Thigh;  Surgeon: Seymour Jesus MD;  Location: [facility] OR     HERNIA REPAIR       TONSILLECTOMY ADULT Left 2/15/2022    Procedure: left radical tonsillectomy, nasogastric tube placement;  Surgeon: Summer Valladares MD;  Location: [facility] OR     tumor removal of thigh[       vocal cord surgery[       WRIST SURGERY         FAMILY HISTORY:  No family history on file.    REVIEW OF SYSTEMS:  12 point ROS was negative other than the symptoms noted above in the HPI.  Patient Supplied Answers to Review of Systems  [facility] ROS 9/28/2022   Constitutional: Problems with sleep   Ears, Nose, Throat: Hoarseness         PHYSICAL EXAMINATION:   BP (!) 161/109 (BP Location: Right arm, Patient Position: Sitting, Cuff Size: Adult Regular)   Ht 1.727 m (5' 8\")   Wt 76.7 kg (169 lb)   BMI 25.70 kg/m     Appearance:   normal; NAD, age-appropriate appearance, well-developed, normal habitus   Communication:   normal; communicates verbally, normal voice quality   Head/Face:   inspection -  Normal; no scars or visible lesions   Salivary glands -  Normal size, no tenderness, swelling, or palpable masses   Facial strength -  Normal and symmetric    Skin:  normal, no rash   Ears:  auricle (AD) -  Normal  EAC (AD) - cerumen  TM (AD) - unable to visualize  auricle (AS) -  Normal  EAC (AS) - " normal  TM (AS) - normal  Normal clinical speech reception   Nose:  Ext. inspection -  Normal  Internal Inspection -  Normal mucosa, septum, and turbinates   Nasopharynx:  normal mucosa, no masses   Oral Cavity:  lips -  Normal mucosa, oral competence, and stoma size   Age-appropriate dentition, healthy gingival mucosa   Hard palate, buccal, floor of mouth mucosa normal   Tongue - normal movement, no lesions, posteriorly the tongue is tethered to the RMT/tonsil defect on the left, no palpable masses, some scar tissue posteriorly on palpation   Oropharynx:  mucosa -  Normal, no lesions  soft palate -  Normal, no lesions, no asymmetry, normal elevation  tonsils -  S/p left radical tonsillectomy with no masses palpable or on visual inspection, no mucosal lesions  Base of tongue - normal  Vallecula - clear   Hypopharynx:  Normal pyriform sinus and pharyngeal wall mucosa   No pooled secretions    Larynx:  Epiglottis, AE folds, false vocal cords, true vocal cords, arytenoids normal in appearance, bilaterally mobile cords    Neck: Well healed left neck incision  No palpable masses  Some tenderness to palpation in level IIB - stable  Some mild hollowing in left supraclavicular fossa   Lymphatic:  no abnormal nodes   Cardiovascular:  warm, pink, well-perfused extremities without swelling, tenderness, or edema   Respiratory:  Normal respiratory effort, no stridor   Neuro/Psych.:  mood/affect -  normal  mental status -  normal       PROCEDURES:   Flexible fiberoptic laryngoscopy: Scope exam was indicated due to tonsil cancer. Verbal consent was obtained. The nasal cavity was prepped with an aerosolized solution of topical anesthetic and vasoconstrictive agent. The scope was passed through the anterior nasal cavity and advanced. Inspection of the nasopharynx revealed no gross abnormality. The base of tongue and vallecula are normal with the exception of the expected postoperative changes to the left base of tongue/tonsil  without evidence of residual cancer. The epiglottis, AE folds, false cords, true cords, arytenoids are normal.  Inspection of the larynx revealed bilaterally mobile vocal cords. Pyriform sinuses are symmetric. The airway is patent. Procedure tolerated well with no immediate complications noted.              RESULTS REVIEWED:   Reviewed notes from Dr Shaquille Block, PT    CT neck and chest imaging independently reviewed     CT neck and chest reports reviewed       IMPRESSION AND PLAN:   Flaco Willis is a 67 year old man with a p16+ T1N1 SCC of the left tonsil. He underwent surgical resection with left radical tonsillectomy and left neck dissection on 2/15/2022. He was recommended for no further treatment.    He has no evidence of recurrent disease.     We spent time again discussing his left shoulder and the concerns related to it.    He has first bite syndrome. He had botox injections with Dr Shaquille Block in October 2022, will continue to follow-up with her.     He had repeat imaging today. Results were discussed with him today.    I will see him back in 3 months.      Thank you very much for the opportunity to participate in the care of your patient.      Summer Valladares MD  Otolaryngology- Head & Neck Surgery      This note was dictated with voice recognition software and then edited. Please excuse any unintentional errors.       CC:  Ashok Friedman MD  Department of Medical Oncology  [facility]      Alcides Malone MD  Department of Radiation Oncology  [facility]

## 2023-01-01 NOTE — LETTER
1/26/2022       RE: Flaco Willis III  [address] [place] [address]     Dear Colleague,    Thank you for referring your patient, Flaco Willis III, to the [facility] [place] at [facility]. Please see a copy of my visit note below.    Dear Dr. Friedman:    I had the pleasure of meeting Flaco Willis III in consultation today at the [facility] at your request.     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 He developed a left neck mass which was evaluated by his PCP and then referred to his local ENT. He had a CT neck performed on 11/16/2021 which showed a 2.5 x 2.0 cm neck mass. He had an U/S of the neck performed on 12/8/2021 which showed a 3.1 x 2.3 x 1.7 cm left neck mass.  He had an FNA of the neck mass on 12/8/2021 which showed p16+ SCC. He had a PET scan on 1/4/2022 which showed focal uptake in the left tonsil with no obvious CT correlate, 2 cm FDG avid left level IIA node, no distant disease. He saw medical oncology at [facility] and was recommended for radiation +/- chemotherapy, recommended against surgery due to the need for postoperative chemoradiation. He then transferred his care to the [place]. He saw Dr Friedman on 1/19/2022 who discussed various treatment options with him.     He is very anxious about the diagnosis. He has not noticed a change in size of the mass. He has no pain from the neck mass. He has no sore throat. He has no difficulty swallowing without pain. He has lost weight related to stress. He has no ear pain.     He saw the dentist on 1/19/2022 for cleaning. He recently had a dental extraction, supposed to have an implant but has been delayed due to the cancer diagnosis.         Past medical history: irritable bowel, hyperlipidemia, anxiety, myxosarcoma treated with surgery and radiation    Past  Infant discharged home with bili level of 7.21 at 25 HOL. Infant had repeat bili check 11/13 at 80 HOL. Level was 14.27, which is below treatment level of 19.2    Infant is 37 weeks gestation. Mother reports infant is doing well. Feeding well with appropriate voids and stools. Infant has apt scheduled with outpt peds tomorrow 11/14. Mother encouraged to keep this apt and get follow up recommendations from peds office. Mother voiced understanding of plan. surgical history: excision of myxosarcoma, hernia repair    Social history: Smoked on weekends previously several years ago. No chewing tobacco. Drinks beer on weekends. . 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     Family history: No H&N cancer    MEDICATIONS:     Current Outpatient Medications   Medication Sig Dispense Refill     aspirin 81 MG tablet Take 81 mg by mouth       atorvastatin (LIPITOR) 20 MG tablet Take 20 mg by mouth daily       busPIRone (BUSPAR) 10 MG tablet        escitalopram (LEXAPRO) 10 MG tablet Take 10 mg by mouth daily       finasteride (PROSCAR) 5 MG tablet Take 1.25 mg by mouth       IBUPROFEN PO Take 400 mg by mouth as needed for moderate pain        Omega-3 Fatty Acids (FISH OIL PO)        tadalafil (CIALIS) 20 MG tablet Take 20 mg by mouth       atorvastatin (LIPITOR) 10 MG tablet Take 10 mg by mouth At Bedtime  (Patient not taking: Reported on 1/19/2022)       Cholecalciferol (VITAMIN D3) 1000 UNITS CAPS Take 1,000 Units by mouth (Patient not taking: Reported on 1/19/2022)       dextroamphetamine (DEXEDRINE SPANSULE) 15 MG 24 hr capsule Take 15 mg by mouth (Patient not taking: Reported on 1/19/2022)       finasteride (PROPECIA) 1 MG tablet Take 1 mg by mouth (Patient not taking: Reported on 1/19/2022)       LORazepam (ATIVAN) 0.5 MG tablet Take 0.5-1 mg by mouth (Patient not taking: Reported on 1/26/2022)       Mesalamine (PENTASA PO)  (Patient not taking: Reported on 1/19/2022)       Omega-3 Fatty Acids (OMEGA-3 FISH OIL PO)  (Patient not taking: Reported on 1/19/2022)       TADALAFIL PO 1/2-1 tabs 1 hour before activity (Patient not taking: Reported on 1/19/2022)       VITAMIN E MTC PO  (Patient not taking: Reported on 1/19/2022)       zolpidem (AMBIEN) 10 MG tablet Take 10 mg by mouth (Patient not taking: Reported on 1/26/2022)         ALLERGIES:    Allergies   Allergen Reactions     Augmentin Rash       HABITS/SOCIAL HISTORY:   Smoked on weekends  previously several years ago. No chewing tobacco. Drinks beer on weekends.   .   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     Social History     Socioeconomic History     Marital status:      Spouse name: Not on file     Number of children: Not on file     Years of education: Not on file     Highest education level: Not on file   Occupational History     Not on file   Tobacco Use     Smoking status: Former Smoker     Types: Cigars     Start date: 1973     Quit date: 1990     Years since quittin.0     Smokeless tobacco: Never Used   Substance and Sexual Activity     Alcohol use: Yes     Drug use: No     Sexual activity: Yes     Partners: Female     Birth control/protection: Pull-out method   Other Topics Concern     Not on file   Social History Narrative     Not on file     Social Determinants of Health     Financial Resource Strain: Not on file   Food Insecurity: Not on file   Transportation Needs: Not on file   Physical Activity: Not on file   Stress: Not on file   Social Connections: Not on file   Intimate Partner Violence: Not on file   Housing Stability: Not on file       PAST MEDICAL HISTORY:   Past Medical History:   Diagnosis Date     ADD (attention deficit disorder)      Anxiety      Crohn's disease (H)      Hyperlipidemia      Hypertension      Liposarcoma of lower extremity (H)         PAST SURGICAL HISTORY:   Past Surgical History:   Procedure Laterality Date     EXCISE SOFT TISSUE TUMOR THIGH  2013    Procedure: EXCISE SOFT TISSUE TUMOR THIGH;  Excision of Tumor Bed Left Medial Thigh;  Surgeon: Seymour Jesus MD;  Location: [facility] OR     HERNIA REPAIR       tumor removal of thigh[       vocal cord surgery[       WRIST SURGERY         FAMILY HISTORY:  No family history on file.    REVIEW OF SYSTEMS:  12 point ROS was negative other than the symptoms noted above in the HPI.  Patient Supplied Answers to Review of Systems  No flowsheet data  "found.      PHYSICAL EXAMINATION:   BP (!) 170/113 (BP Location: Right arm, Patient Position: Sitting, Cuff Size: Adult Regular)   Pulse 94   Temp 98.1  F (36.7  C) (Temporal)   Ht 1.727 m (5' 8\")   Wt 75.3 kg (166 lb)   BMI 25.24 kg/m     Appearance:   normal; NAD, age-appropriate appearance, well-developed, normal habitus   Communication:   normal; communicates verbally, normal voice quality   Head/Face:   inspection -  Normal; no scars or visible lesions   Salivary glands -  Normal size, no tenderness, swelling, or palpable masses   Facial strength -  Normal and symmetric   Skin:  normal, no rash   Ears:  auricle (AD) -  normal  EAC (AD) -  cerumen  TM (AD) -  Unable to visualize  auricle (AS) -  normal  EAC (AS) -  normal  TM (AS) -  Normal, no effusion  Normal clinical speech reception   Nose:  Ext. inspection -  Normal  Internal Inspection -  Normal mucosa, septum, and turbinates   Nasopharynx:  normal mucosa, no masses   Oral Cavity:  lips -  Normal mucosa, oral competence, and stoma size   Age-appropriate dentition, healthy gingival mucosa   Hard palate, buccal, floor of mouth mucosa normal   Tongue - normal movement, no lesions   Oropharynx:  mucosa -  Normal, no lesions  soft palate -  Normal, no lesions, no asymmetry, normal elevation  tonsils -  Small, left slightly greater than right, left tonsil with firm nodularity on palpation   Hypopharynx:  Normal pyriform sinus and pharyngeal wall mucosa   No pooled secretions    Larynx:  Epiglottis, AE folds, false vocal cords, true vocal cords, arytenoids normal in appearance, bilaterally mobile cords    Neck: No visible mass or asymmetry   Left level II mass about 2-2.5 cm in size, mobile, no overlying skin involvement, nontender  Normal range of motion   Lymphatic:  no abnormal nodes   Cardiovascular:  warm, pink, well-perfused extremities without swelling, tenderness, or edema   Respiratory:  Normal respiratory effort, no stridor   Neuro/Psych.:  " mood/affect -  anxious  mental status -  normal       PROCEDURES:   Flexible fiberoptic laryngoscopy: Scope exam was indicated due to tonsil cancer. Verbal consent was obtained. The nasal cavity was prepped with an aerosolized solution of topical anesthetic and vasoconstrictive agent. The scope was passed through the anterior nasal cavity and advanced. Inspection of the nasopharynx revealed no gross abnormality. The base of tongue and vallecula are normal. The epiglottis, AE folds, false cords, true cords, arytenoids are normal.  Inspection of the larynx revealed bilaterally mobile vocal cords. Pyriform sinuses are symmetric. The airway is patent. Procedure tolerated well with no immediate complications noted.              RESULTS REVIEWED:   I reviewed the note from Dr Friedman, PET scan report, CT neck report, path report (not yet reviewed at [facility])    I independently reviewed the CT neck and PET scan images  *CT neck from 11/2021 shows some asymmetry of the left tonsil, well defined left neck node     Care discussed with SLP      IMPRESSION AND PLAN: *  Flaco Willis is a 66 year old man with a p16+ T1N1 SCC of the left tonsil.    We discussed surgical and nonsurgical treatment of these tumors and the overall favorable prognosis for the patient.    Surgical treatment would be with a left radical tonsillectomy, left neck dissection, NG tube placement. I discussed with him that if the pathology is favorable he could potentially require no further treatment. We discussed that multiple positive nodes or other adverse pathologic features could push us to recommend postoperative radiation. If he had positive margins or IMANI on the node then he would be recommended for postoperative chemoradiation. If he did elect for surgery I would likely obtain a new contrasted CT neck to assess for interval change since the last contrasted CT.     I discussed that we will need to review his case at tumor board but if he elected for  nonsurgical therapy he may very well be able to receive radiation alone rather than chemoradiation. I do have some concerns about him receiving chemotherapy with the potential impacts that the side effects could have on his quality of life related to his job, namely the potential hearing loss and neuropathy.     He does state that he is more interested in nonsurgical therapy with radiation. We discussed locations for treatment and my recommendations. A referral was placed to Dr Malone in radiation oncology. We will work on getting him an appointment within the next week.     He will need to undergo pre-radiation dental clearance if he needs radiation. The dental handout was provided today.    He should have a baseline audiogram prior to treatment if he has chemotherapy.    He should have a baseline video swallow study if he has nonsurgical therapy. He was seen by SLP today.    Port and PEG were not yet discussed by oncology. The swallow study and evaluation by radiation will be important with determining a PEG plan if he has radiation. If he were to require PEG/port his airway is safe for undergoing general anesthesia.    We did spend considerable time discussing the impact of this diagnosis on his mental health and specifically the anxiety he is having. He is already on antidepressant/anxiolytic. A behavioral health referral was placed.     We will review his case at tumor board and uptake on final recommendations.    Thank you very much for the opportunity to participate in the care of your patient.      Summer Valladares MD  Otolaryngology- Head & Neck Surgery      This note was dictated with voice recognition software and then edited. Please excuse any unintentional errors.         I spent over an hour on the patient visit (excluding procedures) and charting activities on the date of service.         CC:  Ashok Friedman MD  Department of Medical Oncology  [facility]        Alcides Malone,  MD  Department of Radiation Oncology  [facility]        Again, thank you for allowing me to participate in the care of your patient.      Sincerely,    Summer Valladares MD

## 2023-01-20 ENCOUNTER — OFFICE VISIT (OUTPATIENT)
Dept: PLASTIC SURGERY | Facility: CLINIC | Age: 68
End: 2023-01-20

## 2023-01-20 DIAGNOSIS — C09.9 TONSILLAR CANCER (H): Primary | ICD-10-CM

## 2023-01-20 DIAGNOSIS — G50.1 ATYPICAL FACIAL PAIN: ICD-10-CM

## 2023-01-20 NOTE — LETTER
January 20, 2023  Re: Flaco Willis III  1955    Dear Dr. Valladares,    Thank you so much for referring Flaco Willis III to the [facility]. I had the pleasure of visiting with Flaco today.     Attached you will find a copy of my note. Please feel free to reach out to me with any questions, [phone].     Facial Plastic and Reconstructive Surgery     Procedure: Chemodenervation with Botulinum Toxin A  Indication: First bite syndrome   Injector: Anette Keane MD     Informed consent was obtained.  The skin was cleaned with antimicrobial solution and a topical ice was placed.      The patient was asked to systematically engage the muscles in the area to be injected. The tuberculin needles were used for subdermal injection and hemostasis was obtained with light digital pressure when needed. The skin was cleaned.      A total of 30 units were injected.  Botulinum toxin A type: Botox     Please see procedure log. Left cheek first bite syndrome and glabella.      The patient tolerated the procedure well and there were no complications. Post procedure care instructions were given to the patient.      Your trust in our practice and care is much appreciated.    Sincerely,  Anette Keane MD

## 2023-01-20 NOTE — LETTER
1/20/2023       RE: Flaco Willis III  [address] [place] [address]     Dear Colleague,    Thank you for referring your patient, Flaco Willis III, to the [facility] at [facility]. Please see a copy of my visit note below.    Facial Plastic and Reconstructive Surgery     Procedure: Chemodenervation with Botulinum Toxin A  Indication: First bite syndrome   Injector: Anette Keane MD     Informed consent was obtained.  The skin was cleaned with antimicrobial solution and a topical ice was placed.      The patient was asked to systematically engage the muscles in the area to be injected. The tuberculin needles were used for subdermal injection and hemostasis was obtained with light digital pressure when needed. The skin was cleaned.      A total of 30 units were injected.  Botulinum toxin A type: Botox     Please see procedure log. Left cheek first bite syndrome and glabella.      The patient tolerated the procedure well and there were no complications. Post procedure care instructions were given to the patient.      Sincerely,    Anette Keane MD

## 2023-01-20 NOTE — PROGRESS NOTES
Facial Plastic and Reconstructive Surgery     Procedure: Chemodenervation with Botulinum Toxin A  Indication: First bite syndrome   Injector: Anette Keane MD     Informed consent was obtained.  The skin was cleaned with antimicrobial solution and a topical ice was placed.      The patient was asked to systematically engage the muscles in the area to be injected. The tuberculin needles were used for subdermal injection and hemostasis was obtained with light digital pressure when needed. The skin was cleaned.      A total of 30 units were injected.  Botulinum toxin A type: Botox     Please see procedure log. Left cheek first bite syndrome and glabella.      The patient tolerated the procedure well and there were no complications. Post procedure care instructions were given to the patient.

## 2023-03-13 NOTE — PROGRESS NOTES
Dear Dr. Friedman:    I had the pleasure of seeing Flaco Willis III in follow-up today at the [facility].     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 He developed a left neck mass which was evaluated by his PCP and then referred to his local ENT. He had a CT neck performed on 11/16/2021 which showed a 2.5 x 2.0 cm neck mass. He had an U/S of the neck performed on 12/8/2021 which showed a 3.1 x 2.3 x 1.7 cm left neck mass.  He had an FNA of the neck mass on 12/8/2021 which showed p16+ SCC. He had a PET scan on 1/4/2022 which showed focal uptake in the left tonsil with no obvious CT correlate, 2 cm FDG avid left level IIA node, no distant disease. He saw medical oncology at [facility] and was recommended for radiation +/- chemotherapy, recommended against surgery due to the need for postoperative chemoradiation. He then transferred his care to the [place]. He saw Dr Friedman on 1/19/2022 who discussed various treatment options with him. He met with Dr Malone in January 2022 to discuss radiation. He decided to proceed with surgery. He was taken to the OR on 2/15/2022 for left radical tonsillectomy and left neck dissection (levels II-IV). Intraoperatively inferior tonsil margin was positive for SCC. A re-resection was performed. Final pathology demonstrated a p16+ SCC in the tonsil of 2.2 mm focus in the inferior margin, a 7 mm focus of SCC, DOI 1 mm in the inferior tonsil margin, and 2 mm of residual SCC in the re-resected inferior tonsil specimen, with closest margin 1.5 mm. There was a 2.6 cm metastatic node in level IIA, no IMANI, 18 remaining nodes negative. He developed postoperative first bite syndrome, addressed with botox injections with Dr Shaquille Block.    Interval history:  He comes in today for follow-up. He was last seen in December 2022. He had botox injections in January 2023 with Dr Shaquille Block. He says  things are going good. He says the shoulder is improved, feels it is less indented than before. He is doing exercises for this. The tightness is slowly improving in his throat/jaw area. He says the first bite syndrome is improving. He has symptoms with the first bite but not persistent like before. He says his eating is otherwise is good, can eat everything he wants. He has no pain with swallowing. He has no sore throat, otalgia, neck masses. He notices his submandibular glands. He has no hemoptysis. He has no weight changes, maybe some gain. His energy is good. He is seeing the dentist. He has no other changes in his health.       MEDICATIONS:     Current Outpatient Medications   Medication Sig Dispense Refill     acetaminophen (TYLENOL) 325 MG tablet 2 tablets (650 mg) by Per NG tube route every 6 hours as needed for other (For optimal non-opioid multimodal pain management to improve pain control.) 30 tablet 0     aspirin 81 MG tablet Take 81 mg by mouth       atorvastatin (LIPITOR) 20 MG tablet Take 20 mg by mouth daily       busPIRone (BUSPAR) 10 MG tablet Take 10 mg by mouth 2 times daily        chlorhexidine (PERIDEX) 0.12 % solution Swish and spit a small amount after every meal and before bed 473 mL 0     Cholecalciferol (VITAMIN D3) 1000 UNITS CAPS Take 1,000 Units by mouth        dextroamphetamine (DEXEDRINE SPANSULE) 15 MG 24 hr capsule Take 15 mg by mouth        escitalopram (LEXAPRO) 10 MG tablet Take 10 mg by mouth daily       finasteride (PROPECIA) 1 MG tablet Take 1 mg by mouth daily        gabapentin (NEURONTIN) 100 MG capsule Take 3 capsules (300 mg) by mouth 2 times daily 180 capsule 0     LORazepam (ATIVAN) 2 MG/ML (HIGH CONC) oral solution 0.25-0.5 mLs (0.5-1 mg) by Oral or Feeding Tube route 2 times daily as needed for anxiety 7 mL 0     melatonin 3 MG tablet Take 1 tablet (3 mg) by mouth nightly as needed for sleep 14 tablet 1     mineral oil-hydrophilic petrolatum (AQUAPHOR) external ointment  Apply a small amount of ointment to the incision and drain sites three times per day and as needed for crusting 99 g 0     naloxone (NARCAN) 4 MG/0.1ML nasal spray Spray 1 spray (4 mg) into one nostril alternating nostrils once as needed for opioid reversal every 2-3 minutes until assistance arrives 0.1 mL 0     Omega-3 Fatty Acids (FISH OIL PO)        senna-docusate (SENOKOT-S/PERICOLACE) 8.6-50 MG tablet 1 tablet by Per NG tube route 2 times daily 28 tablet 0     tadalafil (CIALIS) 20 MG tablet Take 20 mg by mouth       VITAMIN E MTC PO          ALLERGIES:    Allergies   Allergen Reactions     Augmentin Rash       HABITS/SOCIAL HISTORY:   Smoked on weekends previously several years ago. No chewing tobacco. Drinks beer on weekends.   .   Works in [employer] - does a lot of on [employer] work.     Social History     Socioeconomic History     Marital status:      Spouse name: Not on file     Number of children: Not on file     Years of education: Not on file     Highest education level: Not on file   Occupational History     Not on file   Tobacco Use     Smoking status: Former     Types: Cigars     Start date: 1973     Quit date: 1990     Years since quittin.2     Smokeless tobacco: Never   Substance and Sexual Activity     Alcohol use: Yes     Drug use: No     Sexual activity: Yes     Partners: Female     Birth control/protection: Pull-out method   Other Topics Concern     Not on file   Social History Narrative     Not on file     Social Determinants of Health     Financial Resource Strain: Not on file   Food Insecurity: Not on file   Transportation Needs: Not on file   Physical Activity: Not on file   Stress: Not on file   Social Connections: Not on file   Intimate Partner Violence: Not on file   Housing Stability: Not on file       PAST MEDICAL HISTORY:   Past Medical History:   Diagnosis Date     ADD (attention deficit disorder)      Anxiety      Crohn's disease (H)       "Hyperlipidemia      Hypertension      Liposarcoma of lower extremity (H)         PAST SURGICAL HISTORY:   Past Surgical History:   Procedure Laterality Date     DISSECTION RADICAL NECK MODIFIED Left 2/15/2022    Procedure: left modified radical neck dissection.;  Surgeon: Summer Valladares MD;  Location: [facility]     EXCISE SOFT TISSUE TUMOR THIGH  11/21/2013    Procedure: EXCISE SOFT TISSUE TUMOR THIGH;  Excision of Tumor Bed Left Medial Thigh;  Surgeon: Seymour Jesus MD;  Location: [facility] OR     HERNIA REPAIR       TONSILLECTOMY ADULT Left 2/15/2022    Procedure: left radical tonsillectomy, nasogastric tube placement;  Surgeon: Summer Valladares MD;  Location: [facility] OR     tumor removal of thigh[       vocal cord surgery[       WRIST SURGERY         FAMILY HISTORY:  No family history on file.    REVIEW OF SYSTEMS:  12 point ROS was negative other than the symptoms noted above in the HPI.  Patient Supplied Answers to Review of Systems  [facility] ROS 9/28/2022   Constitutional: Problems with sleep   Ears, Nose, Throat: Hoarseness         PHYSICAL EXAMINATION:   BP (!) 161/93 (BP Location: Right arm, Patient Position: Sitting, Cuff Size: Adult Regular)   Pulse 82   Ht 1.727 m (5' 8\")   Wt 76.7 kg (169 lb)   BMI 25.70 kg/m     Appearance:   normal; NAD, age-appropriate appearance, well-developed, normal habitus   Communication:   normal; communicates verbally, normal voice quality   Head/Face:   inspection -  Normal; no scars or visible lesions   Salivary glands -  Normal size, no tenderness, swelling, or palpable masses   Facial strength -  Normal and symmetric    Skin:  normal, no rash   Ears:  auricle (AD) -  Normal  EAC (AD) - cerumen  TM (AD) - unable to visualize  auricle (AS) -  Normal  EAC (AS) - normal  TM (AS) - normal  Normal clinical speech reception   Nose:  Ext. inspection -  Normal  Internal Inspection -  Normal mucosa, septum, and turbinates   Nasopharynx:  normal mucosa, no masses   Oral Cavity:  " lips -  Normal mucosa, oral competence, and stoma size   Age-appropriate dentition, healthy gingival mucosa   Hard palate, buccal, floor of mouth mucosa normal   Tongue - normal movement, no lesions, posteriorly the tongue is mildly tethered to the RMT/tonsil defect on the left, no palpable masses, some scar tissue posteriorly on palpation   Oropharynx:  mucosa -  Normal, no lesions  soft palate -  Normal, no lesions, no asymmetry, normal elevation  tonsils -  S/p left radical tonsillectomy with no masses palpable or on visual inspection, no mucosal lesions  Base of tongue - normal  Vallecula - clear   Hypopharynx:  Normal pyriform sinus and pharyngeal wall mucosa   No pooled secretions    Larynx:  Epiglottis, AE folds, false vocal cords, true vocal cords, arytenoids normal in appearance, bilaterally mobile cords    Neck: Well healed left neck incision  No palpable masses  Less tenderness to palpation  Some mild hollowing in left supraclavicular fossa   Lymphatic:  no abnormal nodes   Cardiovascular:  warm, pink, well-perfused extremities without swelling, tenderness, or edema   Respiratory:  Normal respiratory effort, no stridor   Neuro/Psych.:  mood/affect -  normal  mental status -  normal       PROCEDURES:   Flexible fiberoptic laryngoscopy: Scope exam was indicated due to tonsil cancer. Verbal consent was obtained. The nasal cavity was prepped with an aerosolized solution of topical anesthetic and vasoconstrictive agent. The scope was passed through the anterior nasal cavity and advanced. Inspection of the nasopharynx revealed no gross abnormality. The base of tongue and vallecula are normal with the exception of the expected postoperative changes to the left base of tongue/tonsil without evidence of residual cancer. The epiglottis, AE folds, false cords, true cords, arytenoids are normal.  Inspection of the larynx revealed bilaterally mobile vocal cords. Pyriform sinuses are symmetric. The airway is patent.  Procedure tolerated well with no immediate complications noted.            RESULTS REVIEWED:   Reviewed note from Dr Shaquille Block      IMPRESSION AND PLAN:   Flaco Willis is a 67 year old man with a p16+ T1N1 SCC of the left tonsil. He underwent surgical resection with left radical tonsillectomy and left neck dissection on 2/15/2022. He was recommended for no further treatment.    He has no evidence of recurrent disease.     We spent some time today discussing his mood.    He has first bite syndrome, symptoms improving. He had botox injections with Dr Shaquille Block in January 2023, will continue to follow-up with her.     He will be due for repeat imaging in 3 months.    I will see him back in 3 months with scans.      Thank you very much for the opportunity to participate in the care of your patient.      Summer Valladares MD  Otolaryngology- Head & Neck Surgery      This note was dictated with voice recognition software and then edited. Please excuse any unintentional errors.             CC:  Ashok Friedman MD  Department of Medical Oncology  [facility]      Alcides Malone MD  Department of Radiation Oncology  [facility]

## 2023-03-15 ENCOUNTER — OFFICE VISIT (OUTPATIENT)
Dept: OTOLARYNGOLOGY | Facility: CLINIC | Age: 68
End: 2023-03-15
Payer: COMMERCIAL

## 2023-03-15 VITALS
BODY MASS INDEX: 25.61 KG/M2 | SYSTOLIC BLOOD PRESSURE: 161 MMHG | DIASTOLIC BLOOD PRESSURE: 93 MMHG | HEIGHT: 68 IN | HEART RATE: 82 BPM | WEIGHT: 169 LBS

## 2023-03-15 DIAGNOSIS — C77.0 SECONDARY MALIGNANCY OF LYMPH NODES OF HEAD, FACE AND NECK (H): ICD-10-CM

## 2023-03-15 DIAGNOSIS — C09.9 TONSILLAR CANCER (H): Primary | ICD-10-CM

## 2023-03-15 PROCEDURE — 31575 DIAGNOSTIC LARYNGOSCOPY: CPT | Performed by: OTOLARYNGOLOGY

## 2023-03-15 PROCEDURE — 99213 OFFICE O/P EST LOW 20 MIN: CPT | Mod: 25 | Performed by: OTOLARYNGOLOGY

## 2023-03-15 ASSESSMENT — PAIN SCALES - GENERAL: PAINLEVEL: NO PAIN (0)

## 2023-03-15 NOTE — LETTER
3/15/2023       RE: Flaco Willis III  [address] [place] [address]     Dear Colleague,    Thank you for referring your patient, Flaco Willis III, to the [facility] EAR NOSE AND [facility] [place] at [facility]. Please see a copy of my visit note below.    Dear Dr. Friedman:    I had the pleasure of seeing Flaco Willis III in follow-up today at the [facility] Otolaryng[facility].     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 He developed a left neck mass which was evaluated by his PCP and then referred to his local ENT. He had a CT neck performed on 11/16/2021 which showed a 2.5 x 2.0 cm neck mass. He had an U/S of the neck performed on 12/8/2021 which showed a 3.1 x 2.3 x 1.7 cm left neck mass.  He had an FNA of the neck mass on 12/8/2021 which showed p16+ SCC. He had a PET scan on 1/4/2022 which showed focal uptake in the left tonsil with no obvious CT correlate, 2 cm FDG avid left level IIA node, no distant disease. He saw medical oncology at [facility] and was recommended for radiation +/- chemotherapy, recommended against surgery due to the need for postoperative chemoradiation. He then transferred his care to the [place]. He saw Dr Friedman on 1/19/2022 who discussed various treatment options with him. He met with Dr Malone in January 2022 to discuss radiation. He decided to proceed with surgery. He was taken to the OR on 2/15/2022 for left radical tonsillectomy and left neck dissection (levels II-IV). Intraoperatively inferior tonsil margin was positive for SCC. A re-resection was performed. Final pathology demonstrated a p16+ SCC in the tonsil of 2.2 mm focus in the inferior margin, a 7 mm focus of SCC, DOI 1 mm in the inferior tonsil margin, and 2 mm of residual SCC in the re-resected inferior tonsil specimen, with closest margin 1.5 mm. There was a 2.6 cm  metastatic node in level IIA, no IMANI, 18 remaining nodes negative. He developed postoperative first bite syndrome, addressed with botox injections with Dr Shaquille Block.    Interval history:  He comes in today for follow-up. He was last seen in December 2022. He had botox injections in January 2023 with Dr Shaquille Block. He says things are going good. He says the shoulder is improved, feels it is less indented than before. He is doing exercises for this. The tightness is slowly improving in his throat/jaw area. He says the first bite syndrome is improving. He has symptoms with the first bite but not persistent like before. He says his eating is otherwise is good, can eat everything he wants. He has no pain with swallowing. He has no sore throat, otalgia, neck masses. He notices his submandibular glands. He has no hemoptysis. He has no weight changes, maybe some gain. His energy is good. He is seeing the dentist. He has no other changes in his health.       MEDICATIONS:     Current Outpatient Medications   Medication Sig Dispense Refill     acetaminophen (TYLENOL) 325 MG tablet 2 tablets (650 mg) by Per NG tube route every 6 hours as needed for other (For optimal non-opioid multimodal pain management to improve pain control.) 30 tablet 0     aspirin 81 MG tablet Take 81 mg by mouth       atorvastatin (LIPITOR) 20 MG tablet Take 20 mg by mouth daily       busPIRone (BUSPAR) 10 MG tablet Take 10 mg by mouth 2 times daily        chlorhexidine (PERIDEX) 0.12 % solution Swish and spit a small amount after every meal and before bed 473 mL 0     Cholecalciferol (VITAMIN D3) 1000 UNITS CAPS Take 1,000 Units by mouth        dextroamphetamine (DEXEDRINE SPANSULE) 15 MG 24 hr capsule Take 15 mg by mouth        escitalopram (LEXAPRO) 10 MG tablet Take 10 mg by mouth daily       finasteride (PROPECIA) 1 MG tablet Take 1 mg by mouth daily        gabapentin (NEURONTIN) 100 MG capsule Take 3 capsules (300 mg) by mouth 2 times daily 180  capsule 0     LORazepam (ATIVAN) 2 MG/ML (HIGH CONC) oral solution 0.25-0.5 mLs (0.5-1 mg) by Oral or Feeding Tube route 2 times daily as needed for anxiety 7 mL 0     melatonin 3 MG tablet Take 1 tablet (3 mg) by mouth nightly as needed for sleep 14 tablet 1     mineral oil-hydrophilic petrolatum (AQUAPHOR) external ointment Apply a small amount of ointment to the incision and drain sites three times per day and as needed for crusting 99 g 0     naloxone (NARCAN) 4 MG/0.1ML nasal spray Spray 1 spray (4 mg) into one nostril alternating nostrils once as needed for opioid reversal every 2-3 minutes until assistance arrives 0.1 mL 0     Omega-3 Fatty Acids (FISH OIL PO)        senna-docusate (SENOKOT-S/PERICOLACE) 8.6-50 MG tablet 1 tablet by Per NG tube route 2 times daily 28 tablet 0     tadalafil (CIALIS) 20 MG tablet Take 20 mg by mouth       VITAMIN E MTC PO          ALLERGIES:    Allergies   Allergen Reactions     Augmentin Rash       HABITS/SOCIAL HISTORY:   Smoked on weekends previously several years ago. No chewing tobacco. Drinks beer on weekends.   .   Works in [employer] - does a lot of on [employer] work.     Social History     Socioeconomic History     Marital status:      Spouse name: Not on file     Number of children: Not on file     Years of education: Not on file     Highest education level: Not on file   Occupational History     Not on file   Tobacco Use     Smoking status: Former     Types: Cigars     Start date: 1973     Quit date: 1990     Years since quittin.2     Smokeless tobacco: Never   Substance and Sexual Activity     Alcohol use: Yes     Drug use: No     Sexual activity: Yes     Partners: Female     Birth control/protection: Pull-out method   Other Topics Concern     Not on file   Social History Narrative     Not on file     Social Determinants of Health     Financial Resource Strain: Not on file   Food Insecurity: Not on file   Transportation Needs: Not on  "file   Physical Activity: Not on file   Stress: Not on file   Social Connections: Not on file   Intimate Partner Violence: Not on file   Housing Stability: Not on file       PAST MEDICAL HISTORY:   Past Medical History:   Diagnosis Date     ADD (attention deficit disorder)      Anxiety      Crohn's disease (H)      Hyperlipidemia      Hypertension      Liposarcoma of lower extremity (H)         PAST SURGICAL HISTORY:   Past Surgical History:   Procedure Laterality Date     DISSECTION RADICAL NECK MODIFIED Left 2/15/2022    Procedure: left modified radical neck dissection.;  Surgeon: Summer Valladares MD;  Location: [facility] OR     EXCISE SOFT TISSUE TUMOR THIGH  11/21/2013    Procedure: EXCISE SOFT TISSUE TUMOR THIGH;  Excision of Tumor Bed Left Medial Thigh;  Surgeon: Seymour Jesus MD;  Location: [facility] OR     HERNIA REPAIR       TONSILLECTOMY ADULT Left 2/15/2022    Procedure: left radical tonsillectomy, nasogastric tube placement;  Surgeon: Summer Valladares MD;  Location: [facility] OR     tumor removal of thigh[       vocal cord surgery[       WRIST SURGERY         FAMILY HISTORY:  No family history on file.    REVIEW OF SYSTEMS:  12 point ROS was negative other than the symptoms noted above in the HPI.  Patient Supplied Answers to Review of Systems  [facility] ROS 9/28/2022   Constitutional: Problems with sleep   Ears, Nose, Throat: Hoarseness         PHYSICAL EXAMINATION:   BP (!) 161/93 (BP Location: Right arm, Patient Position: Sitting, Cuff Size: Adult Regular)   Pulse 82   Ht 1.727 m (5' 8\")   Wt 76.7 kg (169 lb)   BMI 25.70 kg/m     Appearance:   normal; NAD, age-appropriate appearance, well-developed, normal habitus   Communication:   normal; communicates verbally, normal voice quality   Head/Face:   inspection -  Normal; no scars or visible lesions   Salivary glands -  Normal size, no tenderness, swelling, or palpable masses   Facial strength -  Normal and symmetric    Skin:  normal, no rash   Ears:  auricle " (AD) -  Normal  EAC (AD) - cerumen  TM (AD) - unable to visualize  auricle (AS) -  Normal  EAC (AS) - normal  TM (AS) - normal  Normal clinical speech reception   Nose:  Ext. inspection -  Normal  Internal Inspection -  Normal mucosa, septum, and turbinates   Nasopharynx:  normal mucosa, no masses   Oral Cavity:  lips -  Normal mucosa, oral competence, and stoma size   Age-appropriate dentition, healthy gingival mucosa   Hard palate, buccal, floor of mouth mucosa normal   Tongue - normal movement, no lesions, posteriorly the tongue is mildly tethered to the RMT/tonsil defect on the left, no palpable masses, some scar tissue posteriorly on palpation   Oropharynx:  mucosa -  Normal, no lesions  soft palate -  Normal, no lesions, no asymmetry, normal elevation  tonsils -  S/p left radical tonsillectomy with no masses palpable or on visual inspection, no mucosal lesions  Base of tongue - normal  Vallecula - clear   Hypopharynx:  Normal pyriform sinus and pharyngeal wall mucosa   No pooled secretions    Larynx:  Epiglottis, AE folds, false vocal cords, true vocal cords, arytenoids normal in appearance, bilaterally mobile cords    Neck: Well healed left neck incision  No palpable masses  Less tenderness to palpation  Some mild hollowing in left supraclavicular fossa   Lymphatic:  no abnormal nodes   Cardiovascular:  warm, pink, well-perfused extremities without swelling, tenderness, or edema   Respiratory:  Normal respiratory effort, no stridor   Neuro/Psych.:  mood/affect -  normal  mental status -  normal       PROCEDURES:   Flexible fiberoptic laryngoscopy: Scope exam was indicated due to tonsil cancer. Verbal consent was obtained. The nasal cavity was prepped with an aerosolized solution of topical anesthetic and vasoconstrictive agent. The scope was passed through the anterior nasal cavity and advanced. Inspection of the nasopharynx revealed no gross abnormality. The base of tongue and vallecula are normal with the  exception of the expected postoperative changes to the left base of tongue/tonsil without evidence of residual cancer. The epiglottis, AE folds, false cords, true cords, arytenoids are normal.  Inspection of the larynx revealed bilaterally mobile vocal cords. Pyriform sinuses are symmetric. The airway is patent. Procedure tolerated well with no immediate complications noted.            RESULTS REVIEWED:   Reviewed note from Dr Shaquille Block      IMPRESSION AND PLAN:   Flaco Willis is a 67 year old man with a p16+ T1N1 SCC of the left tonsil. He underwent surgical resection with left radical tonsillectomy and left neck dissection on 2/15/2022. He was recommended for no further treatment.    He has no evidence of recurrent disease.     We spent some time today discussing his mood.    He has first bite syndrome, symptoms improving. He had botox injections with Dr Shaquille Block in January 2023, will continue to follow-up with her.     He will be due for repeat imaging in 3 months.    I will see him back in 3 months with scans.      Thank you very much for the opportunity to participate in the care of your patient.      Summer Valladares MD  Otolaryngology- Head & Neck Surgery    This note was dictated with voice recognition software and then edited. Please excuse any unintentional errors.     CC:  Ashok Friedman MD  Department of Medical Oncology  [facility]      Alcides Malone MD  Department of Radiation Oncology  [facility]

## 2023-03-15 NOTE — NURSING NOTE
"Chief Complaint   Patient presents with     RECHECK     3 month follow up       Blood pressure (!) 161/93, pulse 82, height 1.727 m (5' 8\"), weight 76.7 kg (169 lb).    Risa Givens, EMT    "

## 2023-04-01 ENCOUNTER — HEALTH MAINTENANCE LETTER (OUTPATIENT)
Age: 68
End: 2023-04-01

## 2023-04-28 ENCOUNTER — OFFICE VISIT (OUTPATIENT)
Dept: PLASTIC SURGERY | Facility: CLINIC | Age: 68
End: 2023-04-28

## 2023-04-28 DIAGNOSIS — Z41.1 ELECTIVE PROCEDURE FOR UNACCEPTABLE COSMETIC APPEARANCE: Primary | ICD-10-CM

## 2023-04-28 NOTE — LETTER
4/28/2023       RE: Flaco Willis III  [address] [place] [address]       Dear Colleague,    Thank you for referring your patient, Flaco Willis III, to the [facility] FACE CENTER at [facility]. Please see a copy of my visit note below.    Facial Plastic and Reconstructive Surgery     Procedure: Chemodenervation with Botulinum Toxin A  Indication: First bite syndrome   Injector: Anette Keane MD     Informed consent was obtained.  The skin was cleaned with antimicrobial solution and a topical ice was placed.      The patient was asked to systematically engage the muscles in the area to be injected. The tuberculin needles were used for subdermal injection and hemostasis was obtained with light digital pressure when needed. The skin was cleaned.      A total of 30 units were injected.  Botulinum toxin A type: Botox     Please see procedure log. Left cheek first bite syndrome and glabella.      The patient tolerated the procedure well and there were no complications. Post procedure care instructions were given to the patient.            Again, thank you for allowing me to participate in the care of your patient.      Sincerely,    Anette Keane MD

## 2023-04-28 NOTE — LETTER
November 27, 2023  Re: Flaco Willis III  1955    Dear Dr. Valladares,    Thank you so much for referring Flaco Willis III to the [facility]. I had the pleasure of visiting with Flaco today.     Attached you will find a copy of my note. Please feel free to reach out to me with any questions, [phone].     Facial Plastic and Reconstructive Surgery     Procedure: Chemodenervation with Botulinum Toxin A  Indication: First bite syndrome   Injector: Anette Keane MD     Informed consent was obtained.  The skin was cleaned with antimicrobial solution and a topical ice was placed.      The patient was asked to systematically engage the muscles in the area to be injected. The tuberculin needles were used for subdermal injection and hemostasis was obtained with light digital pressure when needed. The skin was cleaned.      A total of 30 units were injected.  Botulinum toxin A type: Botox     Please see procedure log. Left cheek first bite syndrome and glabella.      The patient tolerated the procedure well and there were no complications. Post procedure care instructions were given to the patient.              Your trust in our practice and care is much appreciated.    Sincerely,    Anette Keane MD

## 2023-06-17 NOTE — PROGRESS NOTES
.Dear Dr. Friedman:    I had the pleasure of seeing Flaco Willis III in follow-up today at the [facility].     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 He developed a left neck mass which was evaluated by his PCP and then referred to his local ENT. He had a CT neck performed on 11/16/2021 which showed a 2.5 x 2.0 cm neck mass. He had an U/S of the neck performed on 12/8/2021 which showed a 3.1 x 2.3 x 1.7 cm left neck mass.  He had an FNA of the neck mass on 12/8/2021 which showed p16+ SCC. He had a PET scan on 1/4/2022 which showed focal uptake in the left tonsil with no obvious CT correlate, 2 cm FDG avid left level IIA node, no distant disease. He saw medical oncology at [facility] and was recommended for radiation +/- chemotherapy, recommended against surgery due to the need for postoperative chemoradiation. He then transferred his care to the [facility]. He saw Dr Friedman on 1/19/2022 who discussed various treatment options with him. He met with Dr Malone in January 2022 to discuss radiation. He decided to proceed with surgery. He was taken to the OR on 2/15/2022 for left radical tonsillectomy and left neck dissection (levels II-IV). Intraoperatively inferior tonsil margin was positive for SCC. A re-resection was performed. Final pathology demonstrated a p16+ SCC in the tonsil of 2.2 mm focus in the inferior margin, a 7 mm focus of SCC, DOI 1 mm in the inferior tonsil margin, and 2 mm of residual SCC in the re-resected inferior tonsil specimen, with closest margin 1.5 mm. There was a 2.6 cm metastatic node in level IIA, no IMANI, 18 remaining nodes negative. He developed postoperative first bite syndrome, addressed with botox injections with Dr Shaquille Block.    Interval history:  He comes in today for follow-up. He was last seen in March 2023. He comes in with repeat imaging. He says he is doing ok. He continues to  have issues with his shoulder, he thinks this is improved. He wonders if too soon after surgery he overdid it. He says the concavity of his shoulder/clavicular region have resolved. His first bite is improving. He is not sure if its the botox that its helping or just improving with time. He just has the initial discomfort. He is able to chew gum without any symptoms. He still feels like there is scar tissue. He feels like there is a pooling of saliva. He says his eating is otherwise is good, can eat everything he wants. He has no pain with swallowing. He feels like sometimes things will get caught, clear with second surgery. He has no sore throat, otalgia, neck masses. He has no hemoptysis. He has no weight changes. His energy is good. He is seeing the dentist. He has no other changes in his health.       MEDICATIONS:     Current Outpatient Medications   Medication Sig Dispense Refill     acetaminophen (TYLENOL) 325 MG tablet 2 tablets (650 mg) by Per NG tube route every 6 hours as needed for other (For optimal non-opioid multimodal pain management to improve pain control.) 30 tablet 0     aspirin 81 MG tablet Take 81 mg by mouth       atorvastatin (LIPITOR) 20 MG tablet Take 20 mg by mouth daily       busPIRone (BUSPAR) 10 MG tablet Take 10 mg by mouth 2 times daily        chlorhexidine (PERIDEX) 0.12 % solution Swish and spit a small amount after every meal and before bed 473 mL 0     Cholecalciferol (VITAMIN D3) 1000 UNITS CAPS Take 1,000 Units by mouth        dextroamphetamine (DEXEDRINE SPANSULE) 15 MG 24 hr capsule Take 15 mg by mouth        escitalopram (LEXAPRO) 10 MG tablet Take 10 mg by mouth daily       finasteride (PROPECIA) 1 MG tablet Take 1 mg by mouth daily        gabapentin (NEURONTIN) 100 MG capsule Take 3 capsules (300 mg) by mouth 2 times daily 180 capsule 0     LORazepam (ATIVAN) 2 MG/ML (HIGH CONC) oral solution 0.25-0.5 mLs (0.5-1 mg) by Oral or Feeding Tube route 2 times daily as needed for  anxiety 7 mL 0     melatonin 3 MG tablet Take 1 tablet (3 mg) by mouth nightly as needed for sleep 14 tablet 1     mineral oil-hydrophilic petrolatum (AQUAPHOR) external ointment Apply a small amount of ointment to the incision and drain sites three times per day and as needed for crusting 99 g 0     naloxone (NARCAN) 4 MG/0.1ML nasal spray Spray 1 spray (4 mg) into one nostril alternating nostrils once as needed for opioid reversal every 2-3 minutes until assistance arrives 0.1 mL 0     Omega-3 Fatty Acids (FISH OIL PO)        senna-docusate (SENOKOT-S/PERICOLACE) 8.6-50 MG tablet 1 tablet by Per NG tube route 2 times daily 28 tablet 0     tadalafil (CIALIS) 20 MG tablet Take 20 mg by mouth       VITAMIN E MTC PO          ALLERGIES:    Allergies   Allergen Reactions     Amoxicillin-Pot Clavulanate Rash       HABITS/SOCIAL HISTORY:   Smoked on weekends previously several years ago. No chewing tobacco. Drinks beer on weekends.   .   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     Social History     Socioeconomic History     Marital status:      Spouse name: Not on file     Number of children: Not on file     Years of education: Not on file     Highest education level: Not on file   Occupational History     Not on file   Tobacco Use     Smoking status: Former     Types: Cigars     Start date: 1973     Quit date: 1990     Years since quittin.4     Smokeless tobacco: Never   Substance and Sexual Activity     Alcohol use: Yes     Drug use: No     Sexual activity: Yes     Partners: Female     Birth control/protection: Pull-out method   Other Topics Concern     Not on file   Social History Narrative     Not on file     Social Determinants of Health     Financial Resource Strain: Not on file   Food Insecurity: Not on file   Transportation Needs: Not on file   Physical Activity: Not on file   Stress: Not on file   Social Connections: Not on file   Intimate Partner Violence: Not on  "file   Housing Stability: Not on file       PAST MEDICAL HISTORY:   Past Medical History:   Diagnosis Date     ADD (attention deficit disorder)      Anxiety      Crohn's disease (H)      Hyperlipidemia      Hypertension      Liposarcoma of lower extremity (H)         PAST SURGICAL HISTORY:   Past Surgical History:   Procedure Laterality Date     DISSECTION RADICAL NECK MODIFIED Left 2/15/2022    Procedure: left modified radical neck dissection.;  Surgeon: Summer Valladares MD;  Location: [facility] OR     EXCISE SOFT TISSUE TUMOR THIGH  11/21/2013    Procedure: EXCISE SOFT TISSUE TUMOR THIGH;  Excision of Tumor Bed Left Medial Thigh;  Surgeon: Seymour Jesus MD;  Location: [facility] OR     HERNIA REPAIR       TONSILLECTOMY ADULT Left 2/15/2022    Procedure: left radical tonsillectomy, nasogastric tube placement;  Surgeon: Summer Valladares MD;  Location: [facility] OR     tumor removal of thigh[       vocal cord surgery[       WRIST SURGERY         FAMILY HISTORY:  No family history on file.    REVIEW OF SYSTEMS:  12 point ROS was negative other than the symptoms noted above in the HPI.  Patient Supplied Answers to Review of Systems      6/21/2023    11:07 AM    ENT ROS   Constitutional Problems with sleep   Psychology Frequently feeling depressed or sad    Frequently feeling anxious   Ears, Nose, Throat Trouble swallowing         PHYSICAL EXAMINATION:   BP (!) 151/91   Pulse 69   Temp 98.6  F (37  C)   Ht 1.727 m (5' 8\")   Wt 76.7 kg (169 lb)   SpO2 96%   BMI 25.70 kg/m     Appearance:   normal; NAD, age-appropriate appearance, well-developed, normal habitus   Communication:   normal; communicates verbally, normal voice quality   Head/Face:   inspection -  Normal; no scars or visible lesions   Salivary glands -  Normal size, no tenderness, swelling, or palpable masses   Facial strength -  Normal and symmetric    Skin:  normal, no rash   Ears:  auricle (AD) -  Normal  EAC (AD) - cerumen  TM (AD) - unable to " visualize  auricle (AS) -  Normal  EAC (AS) - normal  TM (AS) - normal  Normal clinical speech reception   Nose:  Ext. inspection -  Normal  Internal Inspection -  Normal mucosa, septum, and turbinates   Nasopharynx:  normal mucosa, no masses   Oral Cavity:  lips -  Normal mucosa, oral competence, and stoma size   Age-appropriate dentition, healthy gingival mucosa   Hard palate, buccal, floor of mouth mucosa normal   Tongue - normal movement, no lesions, posteriorly the tongue is mildly tethered to the RMT/tonsil defect on the left, no palpable masses, some scar tissue posteriorly on palpation   Oropharynx:  mucosa -  Normal, no lesions  soft palate -  Normal, no lesions, no asymmetry, normal elevation  tonsils -  S/p left radical tonsillectomy with no masses palpable or on visual inspection, no mucosal lesions  Base of tongue - normal  Vallecula - clear   Hypopharynx:  Normal pyriform sinus and pharyngeal wall mucosa   No pooled secretions    Larynx:  Epiglottis, AE folds, false vocal cords, true vocal cords, arytenoids normal in appearance, bilaterally mobile cords    Neck: Well healed left neck incision  No palpable masses   Lymphatic:  no abnormal nodes   Cardiovascular:  warm, pink, well-perfused extremities without swelling, tenderness, or edema   Respiratory:  Normal respiratory effort, no stridor   Neuro/Psych.:  mood/affect -  normal  mental status -  normal       PROCEDURES:   Flexible fiberoptic laryngoscopy: Scope exam was indicated due to tonsil cancer. Verbal consent was obtained. The nasal cavity was prepped with an aerosolized solution of topical anesthetic and vasoconstrictive agent. The scope was passed through the anterior nasal cavity and advanced. Inspection of the nasopharynx revealed no gross abnormality. The base of tongue and vallecula are normal with the exception of the expected postoperative changes to the left base of tongue/tonsil without evidence of residual cancer. The epiglottis, AE  folds, false cords, true cords, arytenoids are normal.  Inspection of the larynx revealed bilaterally mobile vocal cords. Pyriform sinuses are symmetric. The airway is patent. Procedure tolerated well with no immediate complications noted.                RESULTS REVIEWED:   CT neck and chest imaging independently reviewed    CT neck and chest reports reviewed       IMPRESSION AND PLAN:   Flaco Willis is a 68 year old man with a p16+ T1N1 SCC of the left tonsil. He underwent surgical resection with left radical tonsillectomy and left neck dissection on 2/15/2022. He was recommended for no further treatment.    He has no evidence of recurrent disease.     He has first bite syndrome, symptoms improving. He had botox injections with Dr Shaquille Block in January 2023, will continue to follow-up with her.     He had repeat imaging today. Repeat in 6 months.    I will see him back in 3 months.      Thank you very much for the opportunity to participate in the care of your patient.      Summer Valladares MD  Otolaryngology- Head & Neck Surgery      This note was dictated with voice recognition software and then edited. Please excuse any unintentional errors.             CC:  Ashok Friedman MD  Department of Medical Oncology  [facility]      Alcides Malone MD  Department of Radiation Oncology  [facility]

## 2023-06-21 ENCOUNTER — ANCILLARY PROCEDURE (OUTPATIENT)
Dept: CT IMAGING | Facility: CLINIC | Age: 68
End: 2023-06-21
Attending: OTOLARYNGOLOGY
Payer: COMMERCIAL

## 2023-06-21 ENCOUNTER — OFFICE VISIT (OUTPATIENT)
Dept: OTOLARYNGOLOGY | Facility: CLINIC | Age: 68
End: 2023-06-21
Payer: COMMERCIAL

## 2023-06-21 VITALS
HEIGHT: 68 IN | BODY MASS INDEX: 25.61 KG/M2 | OXYGEN SATURATION: 96 % | WEIGHT: 169 LBS | SYSTOLIC BLOOD PRESSURE: 151 MMHG | HEART RATE: 69 BPM | TEMPERATURE: 98.6 F | DIASTOLIC BLOOD PRESSURE: 91 MMHG

## 2023-06-21 DIAGNOSIS — C77.0 SECONDARY MALIGNANCY OF LYMPH NODES OF HEAD, FACE AND NECK (H): ICD-10-CM

## 2023-06-21 DIAGNOSIS — C09.9 TONSILLAR CANCER (H): Primary | ICD-10-CM

## 2023-06-21 DIAGNOSIS — C09.9 TONSILLAR CANCER (H): ICD-10-CM

## 2023-06-21 LAB
CREAT BLD-MCNC: 0.9 MG/DL (ref 0.7–1.3)
GFR SERPL CREATININE-BSD FRML MDRD: >60 ML/MIN/1.73M2

## 2023-06-21 PROCEDURE — 71260 CT THORAX DX C+: CPT | Mod: GC | Performed by: RADIOLOGY

## 2023-06-21 PROCEDURE — 70491 CT SOFT TISSUE NECK W/DYE: CPT | Performed by: STUDENT IN AN ORGANIZED HEALTH CARE EDUCATION/TRAINING PROGRAM

## 2023-06-21 PROCEDURE — 99000 SPECIMEN HANDLING OFFICE-LAB: CPT | Performed by: PATHOLOGY

## 2023-06-21 PROCEDURE — 82565 ASSAY OF CREATININE: CPT | Performed by: OTOLARYNGOLOGY

## 2023-06-21 PROCEDURE — 99214 OFFICE O/P EST MOD 30 MIN: CPT | Mod: 25 | Performed by: OTOLARYNGOLOGY

## 2023-06-21 PROCEDURE — 31575 DIAGNOSTIC LARYNGOSCOPY: CPT | Performed by: OTOLARYNGOLOGY

## 2023-06-21 RX ORDER — IOPAMIDOL 755 MG/ML
83 INJECTION, SOLUTION INTRAVASCULAR ONCE
Status: COMPLETED | OUTPATIENT
Start: 2023-06-21 | End: 2023-06-21

## 2023-06-21 RX ADMIN — IOPAMIDOL 83 ML: 755 INJECTION, SOLUTION INTRAVASCULAR at 10:18

## 2023-06-21 ASSESSMENT — PAIN SCALES - GENERAL: PAINLEVEL: NO PAIN (0)

## 2023-06-21 NOTE — NURSING NOTE
"Chief Complaint   Patient presents with     RECHECK     3 month follow up with CT prior      Blood pressure (!) 151/91, pulse 69, temperature 98.6  F (37  C), height 1.727 m (5' 8\"), weight 76.7 kg (169 lb), SpO2 96 %.    Cole Dickson LPN    "

## 2023-06-21 NOTE — LETTER
6/21/2023       RE: Flaco Willis III  [address] [place] [address]     Dear Colleague,    Thank you for referring your patient, Flaco Willis III, to the [facility] [place] at [facility]. Please see a copy of my visit note below.    .Dear Dr. Friedman:    I had the pleasure of seeing Flaco Willis III in follow-up today at the [facility].     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 He developed a left neck mass which was evaluated by his PCP and then referred to his local ENT. He had a CT neck performed on 11/16/2021 which showed a 2.5 x 2.0 cm neck mass. He had an U/S of the neck performed on 12/8/2021 which showed a 3.1 x 2.3 x 1.7 cm left neck mass.  He had an FNA of the neck mass on 12/8/2021 which showed p16+ SCC. He had a PET scan on 1/4/2022 which showed focal uptake in the left tonsil with no obvious CT correlate, 2 cm FDG avid left level IIA node, no distant disease. He saw medical oncology at [facility] and was recommended for radiation +/- chemotherapy, recommended against surgery due to the need for postoperative chemoradiation. He then transferred his care to the [facility]. He saw Dr Friedman on 1/19/2022 who discussed various treatment options with him. He met with Dr Malone in January 2022 to discuss radiation. He decided to proceed with surgery. He was taken to the OR on 2/15/2022 for left radical tonsillectomy and left neck dissection (levels II-IV). Intraoperatively inferior tonsil margin was positive for SCC. A re-resection was performed. Final pathology demonstrated a p16+ SCC in the tonsil of 2.2 mm focus in the inferior margin, a 7 mm focus of SCC, DOI 1 mm in the inferior tonsil margin, and 2 mm of residual SCC in the re-resected inferior tonsil specimen, with closest margin 1.5 mm. There was a 2.6 cm  metastatic node in level IIA, no IMANI, 18 remaining nodes negative. He developed postoperative first bite syndrome, addressed with botox injections with Dr Shaquille Block.    Interval history:  He comes in today for follow-up. He was last seen in March 2023. He comes in with repeat imaging. He says he is doing ok. He continues to have issues with his shoulder, he thinks this is improved. He wonders if too soon after surgery he overdid it. He says the concavity of his shoulder/clavicular region have resolved. His first bite is improving. He is not sure if its the botox that its helping or just improving with time. He just has the initial discomfort. He is able to chew gum without any symptoms. He still feels like there is scar tissue. He feels like there is a pooling of saliva. He says his eating is otherwise is good, can eat everything he wants. He has no pain with swallowing. He feels like sometimes things will get caught, clear with second surgery. He has no sore throat, otalgia, neck masses. He has no hemoptysis. He has no weight changes. His energy is good. He is seeing the dentist. He has no other changes in his health.       MEDICATIONS:     Current Outpatient Medications   Medication Sig Dispense Refill     acetaminophen (TYLENOL) 325 MG tablet 2 tablets (650 mg) by Per NG tube route every 6 hours as needed for other (For optimal non-opioid multimodal pain management to improve pain control.) 30 tablet 0     aspirin 81 MG tablet Take 81 mg by mouth       atorvastatin (LIPITOR) 20 MG tablet Take 20 mg by mouth daily       busPIRone (BUSPAR) 10 MG tablet Take 10 mg by mouth 2 times daily        chlorhexidine (PERIDEX) 0.12 % solution Swish and spit a small amount after every meal and before bed 473 mL 0     Cholecalciferol (VITAMIN D3) 1000 UNITS CAPS Take 1,000 Units by mouth        dextroamphetamine (DEXEDRINE SPANSULE) 15 MG 24 hr capsule Take 15 mg by mouth        escitalopram (LEXAPRO) 10 MG tablet Take 10 mg  by mouth daily       finasteride (PROPECIA) 1 MG tablet Take 1 mg by mouth daily        gabapentin (NEURONTIN) 100 MG capsule Take 3 capsules (300 mg) by mouth 2 times daily 180 capsule 0     LORazepam (ATIVAN) 2 MG/ML (HIGH CONC) oral solution 0.25-0.5 mLs (0.5-1 mg) by Oral or Feeding Tube route 2 times daily as needed for anxiety 7 mL 0     melatonin 3 MG tablet Take 1 tablet (3 mg) by mouth nightly as needed for sleep 14 tablet 1     mineral oil-hydrophilic petrolatum (AQUAPHOR) external ointment Apply a small amount of ointment to the incision and drain sites three times per day and as needed for crusting 99 g 0     naloxone (NARCAN) 4 MG/0.1ML nasal spray Spray 1 spray (4 mg) into one nostril alternating nostrils once as needed for opioid reversal every 2-3 minutes until assistance arrives 0.1 mL 0     Omega-3 Fatty Acids (FISH OIL PO)        senna-docusate (SENOKOT-S/PERICOLACE) 8.6-50 MG tablet 1 tablet by Per NG tube route 2 times daily 28 tablet 0     tadalafil (CIALIS) 20 MG tablet Take 20 mg by mouth       VITAMIN E MTC PO          ALLERGIES:    Allergies   Allergen Reactions     Amoxicillin-Pot Clavulanate Rash       HABITS/SOCIAL HISTORY:   Smoked on weekends previously several years ago. No chewing tobacco. Drinks beer on weekends.   .   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     Social History     Socioeconomic History     Marital status:      Spouse name: Not on file     Number of children: Not on file     Years of education: Not on file     Highest education level: Not on file   Occupational History     Not on file   Tobacco Use     Smoking status: Former     Types: Cigars     Start date: 1973     Quit date: 1990     Years since quittin.4     Smokeless tobacco: Never   Substance and Sexual Activity     Alcohol use: Yes     Drug use: No     Sexual activity: Yes     Partners: Female     Birth control/protection: Pull-out method   Other Topics Concern  "    Not on file   Social History Narrative     Not on file     Social Determinants of Health     Financial Resource Strain: Not on file   Food Insecurity: Not on file   Transportation Needs: Not on file   Physical Activity: Not on file   Stress: Not on file   Social Connections: Not on file   Intimate Partner Violence: Not on file   Housing Stability: Not on file       PAST MEDICAL HISTORY:   Past Medical History:   Diagnosis Date     ADD (attention deficit disorder)      Anxiety      Crohn's disease (H)      Hyperlipidemia      Hypertension      Liposarcoma of lower extremity (H)         PAST SURGICAL HISTORY:   Past Surgical History:   Procedure Laterality Date     DISSECTION RADICAL NECK MODIFIED Left 2/15/2022    Procedure: left modified radical neck dissection.;  Surgeon: Summer Valladares MD;  Location: [facility] OR     EXCISE SOFT TISSUE TUMOR THIGH  11/21/2013    Procedure: EXCISE SOFT TISSUE TUMOR THIGH;  Excision of Tumor Bed Left Medial Thigh;  Surgeon: Seymour Jesus MD;  Location: [facility] OR     HERNIA REPAIR       TONSILLECTOMY ADULT Left 2/15/2022    Procedure: left radical tonsillectomy, nasogastric tube placement;  Surgeon: Summer Valladares MD;  Location: [facility] OR     tumor removal of thigh[       vocal cord surgery[       WRIST SURGERY         FAMILY HISTORY:  No family history on file.    REVIEW OF SYSTEMS:  12 point ROS was negative other than the symptoms noted above in the HPI.  Patient Supplied Answers to Review of Systems      6/21/2023    11:07 AM    ENT ROS   Constitutional Problems with sleep   Psychology Frequently feeling depressed or sad    Frequently feeling anxious   Ears, Nose, Throat Trouble swallowing         PHYSICAL EXAMINATION:   BP (!) 151/91   Pulse 69   Temp 98.6  F (37  C)   Ht 1.727 m (5' 8\")   Wt 76.7 kg (169 lb)   SpO2 96%   BMI 25.70 kg/m     Appearance:   normal; NAD, age-appropriate appearance, well-developed, normal habitus   Communication:   normal; communicates " verbally, normal voice quality   Head/Face:   inspection -  Normal; no scars or visible lesions   Salivary glands -  Normal size, no tenderness, swelling, or palpable masses   Facial strength -  Normal and symmetric    Skin:  normal, no rash   Ears:  auricle (AD) -  Normal  EAC (AD) - cerumen  TM (AD) - unable to visualize  auricle (AS) -  Normal  EAC (AS) - normal  TM (AS) - normal  Normal clinical speech reception   Nose:  Ext. inspection -  Normal  Internal Inspection -  Normal mucosa, septum, and turbinates   Nasopharynx:  normal mucosa, no masses   Oral Cavity:  lips -  Normal mucosa, oral competence, and stoma size   Age-appropriate dentition, healthy gingival mucosa   Hard palate, buccal, floor of mouth mucosa normal   Tongue - normal movement, no lesions, posteriorly the tongue is mildly tethered to the RMT/tonsil defect on the left, no palpable masses, some scar tissue posteriorly on palpation   Oropharynx:  mucosa -  Normal, no lesions  soft palate -  Normal, no lesions, no asymmetry, normal elevation  tonsils -  S/p left radical tonsillectomy with no masses palpable or on visual inspection, no mucosal lesions  Base of tongue - normal  Vallecula - clear   Hypopharynx:  Normal pyriform sinus and pharyngeal wall mucosa   No pooled secretions    Larynx:  Epiglottis, AE folds, false vocal cords, true vocal cords, arytenoids normal in appearance, bilaterally mobile cords    Neck: Well healed left neck incision  No palpable masses   Lymphatic:  no abnormal nodes   Cardiovascular:  warm, pink, well-perfused extremities without swelling, tenderness, or edema   Respiratory:  Normal respiratory effort, no stridor   Neuro/Psych.:  mood/affect -  normal  mental status -  normal       PROCEDURES:   Flexible fiberoptic laryngoscopy: Scope exam was indicated due to tonsil cancer. Verbal consent was obtained. The nasal cavity was prepped with an aerosolized solution of topical anesthetic and vasoconstrictive agent. The  scope was passed through the anterior nasal cavity and advanced. Inspection of the nasopharynx revealed no gross abnormality. The base of tongue and vallecula are normal with the exception of the expected postoperative changes to the left base of tongue/tonsil without evidence of residual cancer. The epiglottis, AE folds, false cords, true cords, arytenoids are normal.  Inspection of the larynx revealed bilaterally mobile vocal cords. Pyriform sinuses are symmetric. The airway is patent. Procedure tolerated well with no immediate complications noted.                RESULTS REVIEWED:   CT neck and chest imaging independently reviewed    CT neck and chest reports reviewed       IMPRESSION AND PLAN:   Flaco Willis is a 68 year old man with a p16+ T1N1 SCC of the left tonsil. He underwent surgical resection with left radical tonsillectomy and left neck dissection on 2/15/2022. He was recommended for no further treatment.    He has no evidence of recurrent disease.     He has first bite syndrome, symptoms improving. He had botox injections with Dr Shaquille Block in January 2023, will continue to follow-up with her.     He had repeat imaging today. Repeat in 6 months.    I will see him back in 3 months.      Thank you very much for the opportunity to participate in the care of your patient.      Summer Valladares MD  Otolaryngology- Head & Neck Surgery      This note was dictated with voice recognition software and then edited. Please excuse any unintentional errors.             CC:  Ashok Friedman MD  Department of Medical Oncology  [facility]      Alcides Malone MD  Department of Radiation Oncology  [facility]

## 2023-07-19 ENCOUNTER — TELEPHONE (OUTPATIENT)
Dept: OTOLARYNGOLOGY | Facility: CLINIC | Age: 68
End: 2023-07-19
Payer: COMMERCIAL

## 2023-07-19 DIAGNOSIS — C09.9 TONSILLAR CANCER (H): ICD-10-CM

## 2023-07-19 DIAGNOSIS — F32.9 REACTIVE DEPRESSION: Primary | ICD-10-CM

## 2023-07-19 NOTE — TELEPHONE ENCOUNTER
[facility] Call Center    Phone Message    May a detailed message be left on voicemail: yes     Reason for Call: Other: per patient wanting to referral for anxiety stated talked to dr. dempsey about it at last visit wasnt sure where to start, Please contact to discuss. thank you      Action Taken: Other: ENT    Travel Screening: Not Applicable

## 2023-07-19 NOTE — TELEPHONE ENCOUNTER
Called patient to confirm that mental health referral was placed. Patient verbalized understanding and will call clinic with further questions or concerns.

## 2023-08-14 ASSESSMENT — ANXIETY QUESTIONNAIRES
5. BEING SO RESTLESS THAT IT IS HARD TO SIT STILL: SEVERAL DAYS
GAD7 TOTAL SCORE: 5
1. FEELING NERVOUS, ANXIOUS, OR ON EDGE: SEVERAL DAYS
6. BECOMING EASILY ANNOYED OR IRRITABLE: NOT AT ALL
2. NOT BEING ABLE TO STOP OR CONTROL WORRYING: SEVERAL DAYS
7. FEELING AFRAID AS IF SOMETHING AWFUL MIGHT HAPPEN: NOT AT ALL
IF YOU CHECKED OFF ANY PROBLEMS ON THIS QUESTIONNAIRE, HOW DIFFICULT HAVE THESE PROBLEMS MADE IT FOR YOU TO DO YOUR WORK, TAKE CARE OF THINGS AT HOME, OR GET ALONG WITH OTHER PEOPLE: SOMEWHAT DIFFICULT
4. TROUBLE RELAXING: SEVERAL DAYS
3. WORRYING TOO MUCH ABOUT DIFFERENT THINGS: SEVERAL DAYS
GAD7 TOTAL SCORE: 5

## 2023-08-14 ASSESSMENT — PATIENT HEALTH QUESTIONNAIRE - PHQ9
SUM OF ALL RESPONSES TO PHQ QUESTIONS 1-9: 3
10. IF YOU CHECKED OFF ANY PROBLEMS, HOW DIFFICULT HAVE THESE PROBLEMS MADE IT FOR YOU TO DO YOUR WORK, TAKE CARE OF THINGS AT HOME, OR GET ALONG WITH OTHER PEOPLE: SOMEWHAT DIFFICULT
SUM OF ALL RESPONSES TO PHQ QUESTIONS 1-9: 3

## 2023-08-14 NOTE — PROGRESS NOTES
"    [facility]      PATIENT'S NAME: Flaco Willis III  PREFERRED NAME: Jones  PRONOUNS:       MRN: [id number]  : 1955  ADDRESS: [address] [place] [address]  ACCT. NUMBER:  [id number]  DATE OF SERVICE: 8/15/23  START TIME: 1:42 pm   END TIME: 2:45 pm  PREFERRED PHONE: [phone]  May we leave a program related message: Yes  SERVICE MODALITY:  In-person    [facility] DIAGNOSTIC ASSESSMENT    Identifying Information:  Patient is a 68 year old,   individual.  Patient was referred for an assessment by  [facility].  Patient attended the session alone.    Chief Complaint:   The reason for seeking services at this time is: \"anxiety, depression\".  The problem(s) began 19.    Met with pt for initial assessment. Reports high anxiety with some depressive sx. States he had more anxiety over the past several years in general. States he has taken medication in the past but has not found them helpful and possibly made anxiety worse. Reports some struggles with perfectionism. Denies any hx of therapy or hospitalizations for MH. Working with PCP through [facility] on possibly starting alternative medications. Reports drinking more than he would like as coping for anxiety in the evenings. Denies SI/HI.    Patient has attempted to resolve these concerns in the past through medications .    Social/Family History:  Patient reported they grew up in [place]  .  They were raised by biological parents  .  Parents were always together.  Patient reported that their childhood was good.  Patient described their current relationships with family of origin as fair and stable.     The patient describes their cultural background as .  Cultural influences and impact on patient's life structure, values, norms, and healthcare: Confucianist.  Contextual influences on patient's health include: Contextual Factors: " Individual Factors management of MH .    These factors will be addressed in the Preliminary Treatment plan. Patient identified their preferred language to be English. Patient reported they does not need the assistance of an  or other support involved in therapy.     Patient reported had no significant delays in developmental tasks.   Patient's highest education level was some college  .  Patient identified the following learning problems: none reported.  Modifications will not be used to assist communication in therapy.  Patient reports they are  able to understand written materials.    Patient's current relationship status is  .   Patient identified their sexual orientation as heterosexual.  Patient reported having 1 child(shravan). Patient identified siblings; pets; friends; spouse as part of their support system.  Patient identified the quality of these relationships as good,  .      Patient's current living/housing situation involves staying in own home/apartment.  The immediate members of family and household include Clementine jones, 54,spouses  and they report that housing is stable.    Patient is currently employed fulltime.  Patient reports their finances are obtained through employment. Patient does identify finances as a current stressor.      Patient reported that they have not been involved with the legal system.    . Patient does not report being under probation/ parole/ jurisdiction. They are not under any current court jurisdiction. .    Patient's Strengths and Limitations:  Patient identified the following strengths or resources that will help them succeed in treatment: family support, insight, intelligence, positive work environment, motivation, and sense of humor. Things that may interfere with the patient's success in treatment include:  mental health sx .     Assessments:  The following assessments were completed by patient for this visit:  PHQ2:       6/21/2023    11:07 AM 4/1/2022     11:54 AM 1/26/2022     8:20 AM 7/10/2018     3:37 PM   PHQ-2 ( 1999 [employer])   Q1: Little interest or pleasure in doing things 1 0 2 0   Q2: Feeling down, depressed or hopeless 1 0 1 1   PHQ-2 Score 2 0 3 1   Q1: Little interest or pleasure in doing things    Not at all   Q2: Feeling down, depressed or hopeless    Several days   PHQ-2 Score    1     PHQ9:       1/26/2022     8:20 AM 8/14/2023     3:54 PM   PHQ-9 SCORE   PHQ-9 Total Score MyChart  3 (Minimal depression)   PHQ-9 Total Score 9 3     GAD2:       8/14/2023     4:09 PM   ANAY-2   Feeling nervous, anxious, or on edge 3   Not being able to stop or control worrying 2   ANAY-2 Total Score 5     GAD7:       8/14/2023     4:09 PM   ANAY-7 SCORE   Total Score 5 (mild anxiety)   Total Score 5     CAGE-AID:       8/14/2023     4:11 PM   CAGE-AID Total Score   Total Score 0   Total Score MyChart 0 (A total score of 2 or greater is considered clinically significant)     PROMIS 10-Global Health (all questions and answers displayed):       8/14/2023     4:10 PM   PROMIS 10   In general, would you say your health is: Very good   In general, would you say your quality of life is: Good   In general, how would you rate your physical health? Very good   In general, how would you rate your mental health, including your mood and your ability to think? Good   In general, how would you rate your satisfaction with your social activities and relationships? Very good   In general, please rate how well you carry out your usual social activities and roles Very good   To what extent are you able to carry out your everyday physical activities such as walking, climbing stairs, carrying groceries, or moving a chair? Completely   In the past 7 days, how often have you been bothered by emotional problems such as feeling anxious, depressed, or irritable? Often   In the past 7 days, how would you rate your fatigue on average? Moderate   In the past 7 days, how would you rate your pain on  average, where 0 means no pain, and 10 means worst imaginable pain? 0   In general, would you say your health is: 4   In general, would you say your quality of life is: 3   In general, how would you rate your physical health? 4   In general, how would you rate your mental health, including your mood and your ability to think? 3   In general, how would you rate your satisfaction with your social activities and relationships? 4   In general, please rate how well you carry out your usual social activities and roles. (This includes activities at home, at work and in your community, and responsibilities as a parent, child, spouse, employee, friend, etc.) 4   To what extent are you able to carry out your everyday physical activities such as walking, climbing stairs, carrying groceries, or moving a chair? 5   In the past 7 days, how often have you been bothered by emotional problems such as feeling anxious, depressed, or irritable? 4   In the past 7 days, how would you rate your fatigue on average? 3   In the past 7 days, how would you rate your pain on average, where 0 means no pain, and 10 means worst imaginable pain? 0   Global Mental Health Score 12   Global Physical Health Score 17   PROMIS TOTAL - SUBSCORES 29     PROMIS 10-Global Health (only subscores and total score):       8/14/2023     4:10 PM   PROMIS-10 Scores Only   Global Mental Health Score 12   Global Physical Health Score 17   PROMIS TOTAL - SUBSCORES 29     [place] Suicide Severity Rating Scale (Lifetime/Recent)      8/16/2023     7:57 AM   [place] Suicide Severity Rating (Lifetime/Recent)   Q1 Wish to be Dead (Lifetime) N   Q2 Non-Specific Active Suicidal Thoughts (Lifetime) N   Actual Attempt (Lifetime) N   Has subject engaged in non-suicidal self-injurious behavior? (Lifetime) N   Interrupted Attempts (Lifetime) N   Aborted or Self-Interrupted Attempt (Lifetime) N   Preparatory Acts or Behavior (Lifetime) N   Calculated C-SSRS Risk Score  (Lifetime/Recent) No Risk Indicated       Personal and Family Medical History:  Patient does not report a family history of mental health concerns.  Patient reports family history is not on file..     Patient does not report Mental Health Diagnosis or Treatment.      Patient has had a physical exam to rule out medical causes for current symptoms.  Date of last physical exam was within the past year. Symptoms have developed since last physical exam and client was encouraged to follow up with PCP.  . The patient has a [facility] Primary Care Provider, who is named Darnell Cui..  Patient reports no current medical concerns.  Patient denies any issues with pain..   There are not significant appetite / nutritional concerns / weight changes.   Patient does not report a history of head injury / trauma / cognitive impairment.      Current Outpatient Medications   Medication    acetaminophen (TYLENOL) 325 MG tablet    aspirin 81 MG tablet    atorvastatin (LIPITOR) 20 MG tablet    busPIRone (BUSPAR) 10 MG tablet    chlorhexidine (PERIDEX) 0.12 % solution    Cholecalciferol (VITAMIN D3) 1000 UNITS CAPS    dextroamphetamine (DEXEDRINE SPANSULE) 15 MG 24 hr capsule    escitalopram (LEXAPRO) 10 MG tablet    finasteride (PROPECIA) 1 MG tablet    gabapentin (NEURONTIN) 100 MG capsule    LORazepam (ATIVAN) 2 MG/ML (HIGH CONC) oral solution    melatonin 3 MG tablet    mineral oil-hydrophilic petrolatum (AQUAPHOR) external ointment    naloxone (NARCAN) 4 MG/0.1ML nasal spray    Omega-3 Fatty Acids (FISH OIL PO)    senna-docusate (SENOKOT-S/PERICOLACE) 8.6-50 MG tablet    tadalafil (CIALIS) 20 MG tablet    VITAMIN E MTC PO     No current facility-administered medications for this visit.         Medication Adherence:  Patient reports taking.  taking prescribed medications as prescribed.    Patient Allergies:    Allergies   Allergen Reactions    Amoxicillin-Pot Clavulanate Rash       Medical History:    Past Medical History:    Diagnosis Date    ADD (attention deficit disorder)     Anxiety     Crohn's disease (H)     Hyperlipidemia     Hypertension     Liposarcoma of lower extremity (H)          Current Mental Status Exam:   Appearance:  Appropriate    Eye Contact:  Good   Psychomotor:  Normal       Gait / station:  no problem  Attitude / Demeanor: Cooperative   Speech      Rate / Production: Normal/ Responsive      Volume:  Normal  volume      Language:  intact  Mood:   Anxious  Depressed   Affect:   Appropriate    Thought Content: Clear   Thought Process: Coherent  Logical       Associations: No loosening of associations  Insight:   Fair   Judgment:  Intact   Orientation:  All  Attention/concentration: Good    Substance Use:  Patient did not report a family history of substance use concerns; see medical history section for details.  Patient has not received chemical dependency treatment in the past.  Patient has not ever been to detox.      Patient is not currently receiving any chemical dependency treatment.           Substance History of use Age of first use Date of last use     Pattern and duration of use (include amounts and frequency)   Alcohol currently use   16 08/13/23 REPORTS SUBSTANCE USE: reports using substance 1 times per day and has 2 drinks at a time.   Patient reports heaviest use is current use.   Cannabis   currently use 16 09/20/20 REPORTS SUBSTANCE USE: N/A     Amphetamines   used in the past   09/21/20 REPORTS SUBSTANCE USE: N/A   Cocaine/crack    never used       REPORTS SUBSTANCE USE: N/A   Hallucinogens used in the past   16 09/21/71  REPORTS SUBSTANCE USE: N/A   Inhalants never used         REPORTS SUBSTANCE USE: N/A   Heroin never used         REPORTS SUBSTANCE USE: N/A   Other Opiates used in the past 1992 02/01/22 REPORTS SUBSTANCE USE: N/A   Benzodiazepine   never used     REPORTS SUBSTANCE USE: N/A   Barbiturates never used     REPORTS SUBSTANCE USE: N/A   Over the counter meds never used     REPORTS  SUBSTANCE USE: N/A   Caffeine currently use 19   REPORTS SUBSTANCE USE: reports using substance 1 times per day and has 1   at a time.   Patient reports heaviest use is current use.   Nicotine  used in the past 16 09/21/03 REPORTS SUBSTANCE USE: N/A   Other substances not listed above:  Identify:  never used     REPORTS SUBSTANCE USE: N/A     Patient reported the following problems as a result of their substance use: no problems, not applicable.    Substance Use: No symptoms    Based on the negative CAGE score and clinical interview there  are not indications of drug or alcohol abuse.    Significant Losses / Trauma / Abuse / Neglect Issues:   Patient did not  serve in the .  There are indications or report of significant loss, trauma, abuse or neglect issues related to: are no indications and client denies any losses, trauma, abuse, or neglect concerns.  Concerns for possible neglect are not present.     Safety Assessment:   Patient denies current homicidal ideation and behaviors.  Patient denies current self-injurious ideation and behaviors.    Patient denied risk behaviors associated with substance use.  Patient denies any high risk behaviors associated with mental health symptoms.  Patient reports the following current concerns for their personal safety: None.  Patient reports there are not firearms in the house.       There are no firearms in the home..    History of Safety Concerns:  Patient denied a history of homicidal ideation.     Patient denied a history of personal safety concerns.    Patient denied a history of assaultive behaviors.    Patient denied a history of sexual assault behaviors.     Patient denied a history of risk behaviors associated with substance use.  Patient denies any history of high risk behaviors associated with mental health symptoms.  Patient reports the following protective factors: forward or future oriented thinking; dedication to family or friends; safe and stable  environment; regular physical activity; sense of belonging; purpose; abstinence from substances; living with other people; daily obligations; structured day; commitment to well being; sense of meaning; positive social skills; strong sense of self worth or esteem; sense of personal control or determination    Risk Plan:  See Recommendations for Safety and Risk Management Plan    Review of Symptoms per patient report:   Depression: Change in sleep, Lack of interest, Change in energy level, and Difficulties concentrating  Gia:  No Symptoms  Psychosis: No Symptoms  Anxiety: Excessive worry, Nervousness, Physical complaints, such as headaches, stomachaches, muscle tension, Sleep disturbance, Ruminations, and Poor concentration  Panic:  No symptoms  Post Traumatic Stress Disorder:  No Symptoms   Eating Disorder: No Symptoms  ADD / ADHD:  Distractibility, Forgetful, Restlessness/fidgety, and Hyperactive  Conduct Disorder: No symptoms  Autism Spectrum Disorder: No symptoms  Obsessive Compulsive Disorder: na    Patient reports the following compulsive behaviors and treatment history:  na .      Diagnostic Criteria:   Generalized Anxiety Disorder  A. Excessive anxiety and worry about a number of events or activities (such as work or school performance).   B. The person finds it difficult to control the worry.  C. Select 3 or more symptoms (required for diagnosis). Only one item is required in children.   - Restlessness or feeling keyed up or on edge.    - Being easily fatigued.    - Difficulty concentrating or mind going blank.    - Sleep disturbance (difficulty falling or staying asleep, or restless unsatisfying sleep).   D. The focus of the anxiety and worry is not confined to features of an Axis I disorder.  E. The anxiety, worry, or physical symptoms cause clinically significant distress or impairment in social, occupational, or other important areas of functioning.   F. The disturbance is not due to the direct  physiological effects of a substance (e.g., a drug of abuse, a medication) or a general medical condition (e.g., hyperthyroidism) and does not occur exclusively during a Mood Disorder, a Psychotic Disorder, or a Pervasive Developmental Disorder.    Functional Status:  Patient reports the following functional impairments:  organization, relationship(s), self-care, and work / vocational responsibilities.     Nonprogrammatic care:  Patient is requesting basic services to address current mental health concerns.    Clinical Summary:  1. Reason for assessment: anxiety/depression  .  2. Psychosocial, Cultural and Contextual Factors: management of MH  .  3. Principal DSM5 Diagnoses  (Sustained by DSM5 Criteria Listed Above):   300.02 (F41.1) Generalized Anxiety Disorder.  4. Other Diagnoses that is relevant to services:   .  5. Provisional Diagnosis:  296.31 (F33.0) Major Depressive Disorder, Recurrent Episode, Mild _ as evidenced by see above .  6. Prognosis: Expect Improvement.  7. Likely consequences of symptoms if not treated: continued or worsening of sx.  8. Client strengths include:  creative, educated, employed, goal-focused, intelligent, motivated, open to learning, open to suggestions / feedback, and support of family, friends and providers .     Recommendations:     1. Plan for Safety and Risk Management:   Safety and Risk: Recommended that patient call 911 or go to the local ED should there be a change in any of these risk factors..          Report to child / adult protection services was NA.     2. Patient's identified mental health concerns with a cultural influence will be addressed by therapy .     3. Initial Treatment will focus on:    Depressed Mood - see above  Anxiety - see aboce .     4. Resources/Service Plan:    services are not indicated.   Modifications to assist communication are not indicated.   Additional disability accommodations are not indicated.      5.  Collaboration:   Collaboration / coordination of treatment will be initiated with the following  support professionals: primary care physician.      6.  Referrals:   The following referral(s) will be initiated:  will continue to assess . Next Scheduled Appointment: tbd.      A Release of Information has been obtained for the following:  na .     Emergency Contact  was obtained.      Clinical Substantiation/medical necessity for the above recommendations:  na.    7. AURELIO:    AURELIO:  Discussed the general effects of drugs and alcohol on health and well-being. Provider gave patient printed information about the effects of chemical use on their health and well being. Recommendations:  limit/reduce .     8. Records:   These were not available for review at time of assessment.   Information in this assessment was obtained from the medical record and  provided by patient who is a good historian. Patient will have open access to their mental health medical record.    9.   Interactive Complexity: No    Provider Name/ Credentials:  Juany Garcia MS LICSW  August 15, 2023    Answers submitted by the patient for this visit:  Patient Health Questionnaire (Submitted on 8/14/2023)  If you checked off any problems, how difficult have these problems made it for you to do your work, take care of things at home, or get along with other people?: Somewhat difficult  PHQ9 TOTAL SCORE: 3  ANAY-7 (Submitted on 8/14/2023)  ANAY 7 TOTAL SCORE: 5

## 2023-08-15 ENCOUNTER — OFFICE VISIT (OUTPATIENT)
Dept: BEHAVIORAL HEALTH | Facility: CLINIC | Age: 68
End: 2023-08-15
Payer: COMMERCIAL

## 2023-08-15 DIAGNOSIS — F41.1 GENERALIZED ANXIETY DISORDER: Primary | ICD-10-CM

## 2023-08-15 PROCEDURE — 90791 PSYCH DIAGNOSTIC EVALUATION: CPT | Performed by: SOCIAL WORKER

## 2023-08-16 ASSESSMENT — COLUMBIA-SUICIDE SEVERITY RATING SCALE - C-SSRS
1. HAVE YOU WISHED YOU WERE DEAD OR WISHED YOU COULD GO TO SLEEP AND NOT WAKE UP?: NO
TOTAL  NUMBER OF ABORTED OR SELF INTERRUPTED ATTEMPTS LIFETIME: NO
6. HAVE YOU EVER DONE ANYTHING, STARTED TO DO ANYTHING, OR PREPARED TO DO ANYTHING TO END YOUR LIFE?: NO
2. HAVE YOU ACTUALLY HAD ANY THOUGHTS OF KILLING YOURSELF?: NO
ATTEMPT LIFETIME: NO
TOTAL  NUMBER OF INTERRUPTED ATTEMPTS LIFETIME: NO

## 2023-08-23 ENCOUNTER — VIRTUAL VISIT (OUTPATIENT)
Dept: INTERNAL MEDICINE | Facility: CLINIC | Age: 68
End: 2023-08-23
Payer: COMMERCIAL

## 2023-08-23 DIAGNOSIS — F43.23 ADJUSTMENT DISORDER WITH MIXED ANXIETY AND DEPRESSED MOOD: ICD-10-CM

## 2023-08-23 DIAGNOSIS — F41.1 GENERALIZED ANXIETY DISORDER: Primary | ICD-10-CM

## 2023-08-23 PROCEDURE — 99442 PR PHYSICIAN TELEPHONE EVALUATION 11-20 MIN: CPT | Mod: 95 | Performed by: INTERNAL MEDICINE

## 2023-08-23 NOTE — PROGRESS NOTES
"Jones is a 68 year old who is being evaluated via a billable telephone visit.      What phone number would you like to be contacted at? [phone]   How would you like to obtain your AVS? Jossy    D[place] Location (provider location):  On-site    Assessment & Plan     (F41.1) Generalized anxiety disorder  (primary encounter diagnosis)  Comment: Longstanding anxiety issue.  Not responding to typical SSRI and NSRI agents.  Given the extent to which his symptoms are bothering him, he has been recommended to see a psychiatrist for further evaluation and consideration for next level of treatment options which may include atypical antipsychotics or valproic acid/Lamictal/gabapentin.  I will be happy to order referral as requested in order for him to potentially see a [place] psychiatry provider.  His primary care provider is Dr. Darnell Cui at the [facility], and will continue remain there.  He should also feel free to explore referral to other psychiatrist as given by his primary physician.  I would provide a referral to give him the option of seeing a [place] provider potentially as well.    In the meantime, I recommend he continue to work with his primary physician.  I recommend he continue to start the venlafaxine as prescribed by his primary care provider until he is seen by a psychiatrist.  If he develops side effects from this new medicine, he should contact his primary provider to discuss other options.  Plan: Adult Mental Health  Referral            (F43.23) Adjustment disorder with mixed anxiety and depressed mood  Comment:     Plan: Adult Mental Health  Referral                        BMI:   Estimated body mass index is 25.7 kg/m  as calculated from the following:    Height as of 6/21/23: 1.727 m (5' 8\").    Weight as of 6/21/23: 76.7 kg (169 lb).           Cheo Cui MD  [facility]    Subjective   Jones is a 68 year old, " presenting for the following health issues:    Referral      8/23/2023     3:21 PM   Additional Questions   Roomed by Vy CRISTINA CMA       HPI     Needs referral from [place] provider to see a [place] psychiatrist per his regular provider and his psychologist-(per patient).    Long history of depression and anxiety intermix.  More recently anxious.  Has tried numerous medicines including escitalopram and venlafaxine which have not helped symptoms and in fact and worsened anxiety.    Chronic insomnia.    Been working recently with a [place] therapist.    Has been recommended to see a psychiatrist by both his primary physician and the therapist.    He was told that in order to see a [place] psychiatrist, he would need a referral from a [place] primary care physician which is the reason for the call today.    He also has additional referral options and other psychiatry clinics that he is exploring.    **I reviewed the information recorded in the patient's EPIC chart (including but not limited to medical history, surgical history, family history, problem list, medication list, and allergy list) and updated the information as indicated based on the patients reported information.       Review of Systems   Constitutional, HEENT, cardiovascular, pulmonary, gi and gu systems are negative, except as otherwise noted.      Objective           Vitals:  No vitals were obtained today due to virtual visit.    Physical Exam   healthy, alert, and no distress  PSYCH: Alert and oriented times 3; coherent speech, normal   rate and volume, able to articulate logical thoughts, able   to abstract reason, no tangential thoughts, no hallucinations   or delusions  His affect is anxious  RESP: No cough, no audible wheezing, able to talk in full sentences  Remainder of exam unable to be completed due to telephone visits                Phone call duration: 25:05 minutes

## 2023-08-25 ENCOUNTER — OFFICE VISIT (OUTPATIENT)
Dept: PLASTIC SURGERY | Facility: CLINIC | Age: 68
End: 2023-08-25

## 2023-08-25 DIAGNOSIS — G50.1 ATYPICAL FACIAL PAIN: Primary | ICD-10-CM

## 2023-08-25 NOTE — LETTER
August 25, 2023  Re: Flaco Willis III  1955    Dear Dr. Valladares,    Thank you so much for referring Flaco Willis III to the [facility]. I had the pleasure of visiting with Flaco today.     Attached you will find a copy of my note. Please feel free to reach out to me with any questions, [phone].     Facial Plastic and Reconstructive Surgery     Procedure: Chemodenervation with Botulinum Toxin A  Indication: First bite syndrome   Injector: Anette Keane MD     Informed consent was obtained.  The skin was cleaned with antimicrobial solution and a topical ice was placed.      The patient was asked to systematically engage the muscles in the area to be injected. The tuberculin needles were used for subdermal injection and hemostasis was obtained with light digital pressure when needed. The skin was cleaned.      A total of 30 units were injected.  Botulinum toxin A type: Botox     Please see procedure log. Left cheek first bite syndrome and glabella.      The patient tolerated the procedure well and there were no complications. Post procedure care instructions were given to the patient.                    Your trust in our practice and care is much appreciated.    Sincerely,    Anette Keane MD

## 2023-08-25 NOTE — LETTER
8/25/2023       RE: Flaco Willis III  [address] [place] [address]     Dear Colleague,    Thank you for referring your patient, Flaco Willis III, to the [facility] at [facility]. Please see a copy of my visit note below.    Facial Plastic and Reconstructive Surgery     Procedure: Chemodenervation with Botulinum Toxin A  Indication: First bite syndrome   Injector: Anette Keane MD     Informed consent was obtained.  The skin was cleaned with antimicrobial solution and a topical ice was placed.      The patient was asked to systematically engage the muscles in the area to be injected. The tuberculin needles were used for subdermal injection and hemostasis was obtained with light digital pressure when needed. The skin was cleaned.      A total of 30 units were injected.  Botulinum toxin A type: Botox     Please see procedure log. Left cheek first bite syndrome and glabella.      The patient tolerated the procedure well and there were no complications. Post procedure care instructions were given to the patient.                  Again, thank you for allowing me to participate in the care of your patient.      Sincerely,    Anette Keane MD

## 2023-09-25 NOTE — PROGRESS NOTES
.Dear Dr. Friedman:    I had the pleasure of seeing Flaco Willis III in follow-up today at the [facility].     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 He developed a left neck mass which was evaluated by his PCP and then referred to his local ENT. He had a CT neck performed on 11/16/2021 which showed a 2.5 x 2.0 cm neck mass. He had an U/S of the neck performed on 12/8/2021 which showed a 3.1 x 2.3 x 1.7 cm left neck mass.  He had an FNA of the neck mass on 12/8/2021 which showed p16+ SCC. He had a PET scan on 1/4/2022 which showed focal uptake in the left tonsil with no obvious CT correlate, 2 cm FDG avid left level IIA node, no distant disease. He saw medical oncology at [facility] and was recommended for radiation +/- chemotherapy, recommended against surgery due to the need for postoperative chemoradiation. He then transferred his care to the [facility]. He saw Dr Friedman on 1/19/2022 who discussed various treatment options with him. He met with Dr Malone in January 2022 to discuss radiation. He decided to proceed with surgery. He was taken to the OR on 2/15/2022 for left radical tonsillectomy and left neck dissection (levels II-IV). Intraoperatively inferior tonsil margin was positive for SCC. A re-resection was performed. Final pathology demonstrated a p16+ SCC in the tonsil of 2.2 mm focus in the inferior margin, a 7 mm focus of SCC, DOI 1 mm in the inferior tonsil margin, and 2 mm of residual SCC in the re-resected inferior tonsil specimen, with closest margin 1.5 mm. There was a 2.6 cm metastatic node in level IIA, no IMANI, 18 remaining nodes negative. He developed postoperative first bite syndrome, addressed with botox injections with Dr Shaquille Block.    Interval history:  He comes in today for follow-up. He was last seen in June 2023. He saw the mental health team in August 2023. He saw his PCP in August 2023  to follow-up on his anxiety, had medication adjustment. He had botox injection again with Dr Shaquille Block in August 2023. He says things are going ok. He says the shoulder is not a point of discussion, things are better with regards to that. He says with the first bite, things are similar, the botox helps. It is the first bite but he feels certain activities he notices it like playing the drums. He thinks he is getting used to it. He still has the nagging feeling of scar tissue. He notices it more at certain times. He feels his voice is gravely at times. He is dealing with some stress related to work so is eating less. His weight is stable to down a little. He has some sticking of foods at times. When he eats he feels like it is slower going down. He cannot feel any pockets where things are going down. He has no ear pain. He has no ear pain. He is seeing the dentist, seen recently. He has no other changes in his health, seeing PCP regularly. He saw a psychologist recently.       MEDICATIONS:     Current Outpatient Medications   Medication Sig Dispense Refill    aspirin 81 MG tablet Take 81 mg by mouth      atorvastatin (LIPITOR) 20 MG tablet Take 20 mg by mouth daily      busPIRone (BUSPAR) 10 MG tablet Take 10 mg by mouth 2 times daily       Cholecalciferol (VITAMIN D3) 1000 UNITS CAPS Take 1,000 Units by mouth       finasteride (PROPECIA) 1 MG tablet Take 1 mg by mouth daily       melatonin 3 MG tablet Take 1 tablet (3 mg) by mouth nightly as needed for sleep 14 tablet 1    Omega-3 Fatty Acids (FISH OIL PO)       tadalafil (CIALIS) 20 MG tablet Take 20 mg by mouth      VITAMIN E MTC PO          ALLERGIES:    Allergies   Allergen Reactions    Amoxicillin-Pot Clavulanate Rash       HABITS/SOCIAL HISTORY:   Smoked on weekends previously several years ago. No chewing tobacco. Drinks beer on weekends.   .   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     Social History     Socioeconomic History     Marital status:      Spouse name: Not on file    Number of children: Not on file    Years of education: Not on file    Highest education level: Not on file   Occupational History    Not on file   Tobacco Use    Smoking status: Former     Types: Cigars     Start date: 1973     Quit date: 1990     Years since quittin.7    Smokeless tobacco: Never   Substance and Sexual Activity    Alcohol use: Yes    Drug use: No    Sexual activity: Yes     Partners: Female     Birth control/protection: Pull-out method   Other Topics Concern    Not on file   Social History Narrative    Not on file     Social Determinants of Health     Financial Resource Strain: Not on file   Food Insecurity: Not on file   Transportation Needs: Not on file   Physical Activity: Not on file   Stress: Not on file   Social Connections: Not on file   Interpersonal Safety: Not on file   Housing Stability: Not on file       PAST MEDICAL HISTORY:   Past Medical History:   Diagnosis Date    ADD (attention deficit disorder)     Anxiety     Crohn's disease (H)     Hyperlipidemia     Hypertension     Liposarcoma of lower extremity (H)         PAST SURGICAL HISTORY:   Past Surgical History:   Procedure Laterality Date    DISSECTION RADICAL NECK MODIFIED Left 2/15/2022    Procedure: left modified radical neck dissection.;  Surgeon: Summer Valladares MD;  Location: [facility] OR    EXCISE SOFT TISSUE TUMOR THIGH  2013    Procedure: EXCISE SOFT TISSUE TUMOR THIGH;  Excision of Tumor Bed Left Medial Thigh;  Surgeon: Seymour Jesus MD;  Location: [facility] OR    HERNIA REPAIR      TONSILLECTOMY ADULT Left 2/15/2022    Procedure: left radical tonsillectomy, nasogastric tube placement;  Surgeon: Summer Valladares MD;  Location: [facility] OR    tumor removal of thigh[      vocal cord surgery[      WRIST SURGERY         FAMILY HISTORY:  No family history on file.    REVIEW OF SYSTEMS:  12 point ROS was negative other than the symptoms noted above in the  "HPI.  Patient Supplied Answers to Review of Systems      6/21/2023    11:07 AM   [facility] ROS   Constitutional Problems with sleep   Psychology Frequently feeling depressed or sad    Frequently feeling anxious   Ears, Nose, Throat Trouble swallowing         PHYSICAL EXAMINATION:   BP (!) 179/109 (BP Location: Left arm, Patient Position: Sitting, Cuff Size: Adult Large)   Pulse 85   Ht 1.727 m (5' 8\")   Wt 76.2 kg (168 lb)   SpO2 97%   BMI 25.54 kg/m     Appearance:   normal; NAD, age-appropriate appearance, well-developed, normal habitus   Communication:   normal; communicates verbally, normal voice quality   Head/Face:   inspection -  Normal; no scars or visible lesions   Salivary glands -  Normal size, no tenderness, swelling, or palpable masses   Facial strength -  Normal and symmetric    Skin:  normal, no rash   Ears:  auricle (AD) -  Normal  EAC (AD) - cerumen  TM (AD) - unable to visualize  auricle (AS) -  Normal  EAC (AS) - normal  TM (AS) - normal  Normal clinical speech reception   Nose:  Ext. inspection -  Normal  Internal Inspection -  Normal mucosa, septum, and turbinates   Nasopharynx:  normal mucosa, no masses   Oral Cavity:  lips -  Normal mucosa, oral competence, and stoma size   Age-appropriate dentition, healthy gingival mucosa   Hard palate, buccal, floor of mouth mucosa normal   Tongue - normal movement, no lesions, posteriorly the tongue is mildly tethered to the RMT/tonsil defect on the left, no palpable masses, some scar tissue posteriorly on palpation   Oropharynx:  mucosa -  Normal, no lesions  soft palate -  Normal, no lesions, no asymmetry, normal elevation  tonsils -  S/p left radical tonsillectomy with no masses palpable or on visual inspection, no mucosal lesions, only minimal scarring  Base of tongue - normal, no masses or mucosal lesions  Vallecula - clear   Hypopharynx:  Normal pyriform sinus and pharyngeal wall mucosa   No pooled secretions    Larynx:  Epiglottis, AE folds, false " vocal cords, true vocal cords, arytenoids normal in appearance, bilaterally mobile cords    Neck: Well healed left neck incision  No palpable masses   Lymphatic:  no abnormal nodes   Cardiovascular:  warm, pink, well-perfused extremities without swelling, tenderness, or edema   Respiratory:  Normal respiratory effort, no stridor   Neuro/Psych.:  mood/affect -  normal  mental status -  normal       PROCEDURES:   Flexible fiberoptic laryngoscopy: Scope exam was indicated due to tonsil cancer. Verbal consent was obtained. The nasal cavity was prepped with an aerosolized solution of topical anesthetic and vasoconstrictive agent. The scope was passed through the anterior nasal cavity and advanced. Inspection of the nasopharynx revealed no gross abnormality. The base of tongue and vallecula are normal with the exception of the expected postoperative changes to the left base of tongue/tonsil without evidence of residual cancer. The epiglottis, AE folds, false cords, true cords, arytenoids are normal.  Inspection of the larynx revealed bilaterally mobile vocal cords. Pyriform sinuses are symmetric. The airway is patent. Procedure tolerated well with no immediate complications noted.    RESULTS REVIEWED:   Note from mental health team, PCP and Dr Shaquille Block reviewed      IMPRESSION AND PLAN:   Flaco Willis is a 68 year old man with a p16+ T1N1 SCC of the left tonsil. He underwent surgical resection with left radical tonsillectomy and left neck dissection on 2/15/2022. He was recommended for no further treatment.    He has no evidence of recurrent disease.     He has first bite syndrome, symptoms stable. He had botox injections with Dr Shaquille Block in August 2023, will continue to follow-up with her.     Repeat imaging in 3 months.    I will see him back in 3 months with scans.      Thank you very much for the opportunity to participate in the care of your patient.      Summer Valladares MD  Otolaryngology- Head & Neck  Surgery      This note was dictated with voice recognition software and then edited. Please excuse any unintentional errors.             CC:  Ashok Friedman MD  Department of Medical Oncology  [facility]      Alcides Malone MD  Department of Radiation Oncology  [facility]

## 2023-09-27 ENCOUNTER — OFFICE VISIT (OUTPATIENT)
Dept: OTOLARYNGOLOGY | Facility: CLINIC | Age: 68
End: 2023-09-27
Payer: COMMERCIAL

## 2023-09-27 VITALS
SYSTOLIC BLOOD PRESSURE: 179 MMHG | HEART RATE: 85 BPM | BODY MASS INDEX: 25.46 KG/M2 | OXYGEN SATURATION: 97 % | DIASTOLIC BLOOD PRESSURE: 109 MMHG | WEIGHT: 168 LBS | HEIGHT: 68 IN

## 2023-09-27 DIAGNOSIS — C77.0 SECONDARY MALIGNANCY OF LYMPH NODES OF HEAD, FACE AND NECK (H): ICD-10-CM

## 2023-09-27 DIAGNOSIS — C09.9 TONSILLAR CANCER (H): Primary | ICD-10-CM

## 2023-09-27 PROCEDURE — 99213 OFFICE O/P EST LOW 20 MIN: CPT | Mod: 25 | Performed by: OTOLARYNGOLOGY

## 2023-09-27 PROCEDURE — 31575 DIAGNOSTIC LARYNGOSCOPY: CPT | Performed by: OTOLARYNGOLOGY

## 2023-09-27 ASSESSMENT — PAIN SCALES - GENERAL: PAINLEVEL: NO PAIN (0)

## 2023-09-27 NOTE — LETTER
9/27/2023       RE: Flaco Willis III  [address] [place] [address]     Dear Colleague,    Thank you for referring your patient, Flaco Willis III, to the [facility] [place] at [facility]. Please see a copy of my visit note below.    .Dear Dr. Friedman:    I had the pleasure of seeing Flaco Willis III in follow-up today at the [facility].     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 He developed a left neck mass which was evaluated by his PCP and then referred to his local ENT. He had a CT neck performed on 11/16/2021 which showed a 2.5 x 2.0 cm neck mass. He had an U/S of the neck performed on 12/8/2021 which showed a 3.1 x 2.3 x 1.7 cm left neck mass.  He had an FNA of the neck mass on 12/8/2021 which showed p16+ SCC. He had a PET scan on 1/4/2022 which showed focal uptake in the left tonsil with no obvious CT correlate, 2 cm FDG avid left level IIA node, no distant disease. He saw medical oncology at [facility] and was recommended for radiation +/- chemotherapy, recommended against surgery due to the need for postoperative chemoradiation. He then transferred his care to the [facility]. He saw Dr Friedman on 1/19/2022 who discussed various treatment options with him. He met with Dr Malone in January 2022 to discuss radiation. He decided to proceed with surgery. He was taken to the OR on 2/15/2022 for left radical tonsillectomy and left neck dissection (levels II-IV). Intraoperatively inferior tonsil margin was positive for SCC. A re-resection was performed. Final pathology demonstrated a p16+ SCC in the tonsil of 2.2 mm focus in the inferior margin, a 7 mm focus of SCC, DOI 1 mm in the inferior tonsil margin, and 2 mm of residual SCC in the re-resected inferior tonsil specimen, with closest margin 1.5 mm. There was a 2.6 cm  metastatic node in level IIA, no IMANI, 18 remaining nodes negative. He developed postoperative first bite syndrome, addressed with botox injections with Dr Shaquille Block.    Interval history:  He comes in today for follow-up. He was last seen in June 2023. He saw the mental health team in August 2023. He saw his PCP in August 2023 to follow-up on his anxiety, had medication adjustment. He had botox injection again with Dr Shaquille Block in August 2023. He says things are going ok. He says the shoulder is not a point of discussion, things are better with regards to that. He says with the first bite, things are similar, the botox helps. It is the first bite but he feels certain activities he notices it like playing the drums. He thinks he is getting used to it. He still has the nagging feeling of scar tissue. He notices it more at certain times. He feels his voice is gravely at times. He is dealing with some stress related to work so is eating less. His weight is stable to down a little. He has some sticking of foods at times. When he eats he feels like it is slower going down. He cannot feel any pockets where things are going down. He has no ear pain. He has no ear pain. He is seeing the dentist, seen recently. He has no other changes in his health, seeing PCP regularly. He saw a psychologist recently.       MEDICATIONS:     Current Outpatient Medications   Medication Sig Dispense Refill    aspirin 81 MG tablet Take 81 mg by mouth      atorvastatin (LIPITOR) 20 MG tablet Take 20 mg by mouth daily      busPIRone (BUSPAR) 10 MG tablet Take 10 mg by mouth 2 times daily       Cholecalciferol (VITAMIN D3) 1000 UNITS CAPS Take 1,000 Units by mouth       finasteride (PROPECIA) 1 MG tablet Take 1 mg by mouth daily       melatonin 3 MG tablet Take 1 tablet (3 mg) by mouth nightly as needed for sleep 14 tablet 1    Omega-3 Fatty Acids (FISH OIL PO)       tadalafil (CIALIS) 20 MG tablet Take 20 mg by mouth      VITAMIN E MTC PO           ALLERGIES:    Allergies   Allergen Reactions    Amoxicillin-Pot Clavulanate Rash       HABITS/SOCIAL HISTORY:   Smoked on weekends previously several years ago. No chewing tobacco. Drinks beer on weekends.   .   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     Social History     Socioeconomic History    Marital status:      Spouse name: Not on file    Number of children: Not on file    Years of education: Not on file    Highest education level: Not on file   Occupational History    Not on file   Tobacco Use    Smoking status: Former     Types: Cigars     Start date: 1973     Quit date: 1990     Years since quittin.7    Smokeless tobacco: Never   Substance and Sexual Activity    Alcohol use: Yes    Drug use: No    Sexual activity: Yes     Partners: Female     Birth control/protection: Pull-out method   Other Topics Concern    Not on file   Social History Narrative    Not on file     Social Determinants of Health     Financial Resource Strain: Not on file   Food Insecurity: Not on file   Transportation Needs: Not on file   Physical Activity: Not on file   Stress: Not on file   Social Connections: Not on file   Interpersonal Safety: Not on file   Housing Stability: Not on file       PAST MEDICAL HISTORY:   Past Medical History:   Diagnosis Date    ADD (attention deficit disorder)     Anxiety     Crohn's disease (H)     Hyperlipidemia     Hypertension     Liposarcoma of lower extremity (H)         PAST SURGICAL HISTORY:   Past Surgical History:   Procedure Laterality Date    DISSECTION RADICAL NECK MODIFIED Left 2/15/2022    Procedure: left modified radical neck dissection.;  Surgeon: Summer Valladares MD;  Location: [facility] OR    EXCISE SOFT TISSUE TUMOR THIGH  2013    Procedure: EXCISE SOFT TISSUE TUMOR THIGH;  Excision of Tumor Bed Left Medial Thigh;  Surgeon: Seymour Jesus MD;  Location: [facility] OR    HERNIA REPAIR      TONSILLECTOMY ADULT Left 2/15/2022     "Procedure: left radical tonsillectomy, nasogastric tube placement;  Surgeon: Summer Valladares MD;  Location: [facility] OR    tumor removal of thigh[      vocal cord surgery[      WRIST SURGERY         FAMILY HISTORY:  No family history on file.    REVIEW OF SYSTEMS:  12 point ROS was negative other than the symptoms noted above in the HPI.  Patient Supplied Answers to Review of Systems      6/21/2023    11:07 AM    ENT ROS   Constitutional Problems with sleep   Psychology Frequently feeling depressed or sad    Frequently feeling anxious   Ears, Nose, Throat Trouble swallowing         PHYSICAL EXAMINATION:   BP (!) 179/109 (BP Location: Left arm, Patient Position: Sitting, Cuff Size: Adult Large)   Pulse 85   Ht 1.727 m (5' 8\")   Wt 76.2 kg (168 lb)   SpO2 97%   BMI 25.54 kg/m     Appearance:   normal; NAD, age-appropriate appearance, well-developed, normal habitus   Communication:   normal; communicates verbally, normal voice quality   Head/Face:   inspection -  Normal; no scars or visible lesions   Salivary glands -  Normal size, no tenderness, swelling, or palpable masses   Facial strength -  Normal and symmetric    Skin:  normal, no rash   Ears:  auricle (AD) -  Normal  EAC (AD) - cerumen  TM (AD) - unable to visualize  auricle (AS) -  Normal  EAC (AS) - normal  TM (AS) - normal  Normal clinical speech reception   Nose:  Ext. inspection -  Normal  Internal Inspection -  Normal mucosa, septum, and turbinates   Nasopharynx:  normal mucosa, no masses   Oral Cavity:  lips -  Normal mucosa, oral competence, and stoma size   Age-appropriate dentition, healthy gingival mucosa   Hard palate, buccal, floor of mouth mucosa normal   Tongue - normal movement, no lesions, posteriorly the tongue is mildly tethered to the RMT/tonsil defect on the left, no palpable masses, some scar tissue posteriorly on palpation   Oropharynx:  mucosa -  Normal, no lesions  soft palate -  Normal, no lesions, no asymmetry, normal " elevation  tonsils -  S/p left radical tonsillectomy with no masses palpable or on visual inspection, no mucosal lesions, only minimal scarring  Base of tongue - normal, no masses or mucosal lesions  Vallecula - clear   Hypopharynx:  Normal pyriform sinus and pharyngeal wall mucosa   No pooled secretions    Larynx:  Epiglottis, AE folds, false vocal cords, true vocal cords, arytenoids normal in appearance, bilaterally mobile cords    Neck: Well healed left neck incision  No palpable masses   Lymphatic:  no abnormal nodes   Cardiovascular:  warm, pink, well-perfused extremities without swelling, tenderness, or edema   Respiratory:  Normal respiratory effort, no stridor   Neuro/Psych.:  mood/affect -  normal  mental status -  normal       PROCEDURES:   Flexible fiberoptic laryngoscopy: Scope exam was indicated due to tonsil cancer. Verbal consent was obtained. The nasal cavity was prepped with an aerosolized solution of topical anesthetic and vasoconstrictive agent. The scope was passed through the anterior nasal cavity and advanced. Inspection of the nasopharynx revealed no gross abnormality. The base of tongue and vallecula are normal with the exception of the expected postoperative changes to the left base of tongue/tonsil without evidence of residual cancer. The epiglottis, AE folds, false cords, true cords, arytenoids are normal.  Inspection of the larynx revealed bilaterally mobile vocal cords. Pyriform sinuses are symmetric. The airway is patent. Procedure tolerated well with no immediate complications noted.    RESULTS REVIEWED:   Note from mental health team, PCP and Dr Shaquille Block reviewed      IMPRESSION AND PLAN:   Flaco Willis is a 68 year old man with a p16+ T1N1 SCC of the left tonsil. He underwent surgical resection with left radical tonsillectomy and left neck dissection on 2/15/2022. He was recommended for no further treatment.    He has no evidence of recurrent disease.     He has first bite  syndrome, symptoms stable. He had botox injections with Dr Shaquille Block in August 2023, will continue to follow-up with her.     Repeat imaging in 3 months.    I will see him back in 3 months with scans.      Thank you very much for the opportunity to participate in the care of your patient.      Summer Valladares MD  Otolaryngology- Head & Neck Surgery      This note was dictated with voice recognition software and then edited. Please excuse any unintentional errors.             CC:  Ashok Friedman MD  Department of Medical Oncology  [facility]      Alcides Malone MD  Department of Radiation Oncology  [facility]

## 2023-12-01 ENCOUNTER — OFFICE VISIT (OUTPATIENT)
Dept: PLASTIC SURGERY | Facility: CLINIC | Age: 68
End: 2023-12-01

## 2023-12-01 DIAGNOSIS — G50.1 ATYPICAL FACIAL PAIN: Primary | ICD-10-CM

## 2023-12-01 NOTE — LETTER
12/1/2023       RE: Flaco Willis III  [address] [place] [address]       Dear Colleague,    Thank you for referring your patient, Flaco Willis III, to the [facility] at [facility]. Please see a copy of my visit note below.    Facial Plastic and Reconstructive Surgery     Procedure: Chemodenervation with Botulinum Toxin A  Indication: First bite syndrome   Injector: Anette Keane MD     Informed consent was obtained.  The skin was cleaned with antimicrobial solution and a topical ice was placed.      The patient was asked to systematically engage the muscles in the area to be injected. The tuberculin needles were used for subdermal injection and hemostasis was obtained with light digital pressure when needed. The skin was cleaned.      A total of 30 units were injected.  Botulinum toxin A type: Botox     Please see procedure log. Left cheek first bite syndrome and glabella.      The patient tolerated the procedure well and there were no complications. Post procedure care instructions were given to the patient.                  Again, thank you for allowing me to participate in the care of your patient.      Sincerely,    Anette Keane MD

## 2023-12-01 NOTE — LETTER
December 1, 2023  Re: Flaco Willis III  1955      Dear Dr. Valladares,    Thank you so much for referring Flaco Willis III to the [facility]. I had the pleasure of visiting with Flaco today.     Attached you will find a copy of my note. Please feel free to reach out to me with any questions, [phone].     Facial Plastic and Reconstructive Surgery     Procedure: Chemodenervation with Botulinum Toxin A  Indication: First bite syndrome   Injector: Anette Keane MD     Informed consent was obtained.  The skin was cleaned with antimicrobial solution and a topical ice was placed.      The patient was asked to systematically engage the muscles in the area to be injected. The tuberculin needles were used for subdermal injection and hemostasis was obtained with light digital pressure when needed. The skin was cleaned.      A total of 30 units were injected.  Botulinum toxin A type: Botox     Please see procedure log. Left cheek first bite syndrome and glabella.      The patient tolerated the procedure well and there were no complications. Post procedure care instructions were given to the patient.                    Your trust in our practice and care is much appreciated.    Sincerely,    Anette Keane MD

## 2024-01-08 NOTE — PROGRESS NOTES
.Dear Dr. Friedman:    I had the pleasure of seeing Flaco Willis III in follow-up today at the [facility].     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 He developed a left neck mass which was evaluated by his PCP and then referred to his local ENT. He had a CT neck performed on 11/16/2021 which showed a 2.5 x 2.0 cm neck mass. He had an U/S of the neck performed on 12/8/2021 which showed a 3.1 x 2.3 x 1.7 cm left neck mass.  He had an FNA of the neck mass on 12/8/2021 which showed p16+ SCC. He had a PET scan on 1/4/2022 which showed focal uptake in the left tonsil with no obvious CT correlate, 2 cm FDG avid left level IIA node, no distant disease. He saw medical oncology at [facility] and was recommended for radiation +/- chemotherapy, recommended against surgery due to the need for postoperative chemoradiation. He then transferred his care to the [place]. He saw Dr Friedman on 1/19/2022 who discussed various treatment options with him. He met with Dr Malone in January 2022 to discuss radiation. He decided to proceed with surgery. He was taken to the OR on 2/15/2022 for left radical tonsillectomy and left neck dissection (levels II-IV). Intraoperatively inferior tonsil margin was positive for SCC. A re-resection was performed. Final pathology demonstrated a p16+ SCC in the tonsil of 2.2 mm focus in the inferior margin, a 7 mm focus of SCC, DOI 1 mm in the inferior tonsil margin, and 2 mm of residual SCC in the re-resected inferior tonsil specimen, with closest margin 1.5 mm. There was a 2.6 cm metastatic node in level IIA, no IMANI, 18 remaining nodes negative. He developed postoperative first bite syndrome, addressed with botox injections with Dr Shaquille Block.    Interval history:  He comes in today for follow-up. He was last seen in September 2023. He comes in with imaging. He had botox again in December 2023. He says  things are going ok. He says things are very similar. He does not notice the scar tissue too much. Most of the time he does not notice it. He says the first bite is the same. He can feel irritation/pain, no significant change. Over time though he thinks it is better. It is not completely resolved. He no longer thinks about it all the time. He is eating a normal diet. He has no sticking of food unless eating too quickly or not chewing enough. He has no pain with swallowing. He feels the neck is doing well. He has no neck masses. He has no ear pain. He is seeing the dentist regularly, no concerns. He has no other changes in his health, seeing PCP regularly. He is staying up to date on other routine health maintenance.       MEDICATIONS:     Current Outpatient Medications   Medication Sig Dispense Refill    aspirin 81 MG tablet Take 81 mg by mouth      atorvastatin (LIPITOR) 20 MG tablet Take 20 mg by mouth daily      busPIRone (BUSPAR) 10 MG tablet Take 10 mg by mouth 2 times daily       Cholecalciferol (VITAMIN D3) 1000 UNITS CAPS Take 1,000 Units by mouth       finasteride (PROPECIA) 1 MG tablet Take 1 mg by mouth daily       melatonin 3 MG tablet Take 1 tablet (3 mg) by mouth nightly as needed for sleep 14 tablet 1    Omega-3 Fatty Acids (FISH OIL PO)       tadalafil (CIALIS) 20 MG tablet Take 20 mg by mouth      VITAMIN E MTC PO          ALLERGIES:    Allergies   Allergen Reactions    Amoxicillin-Pot Clavulanate Rash       HABITS/SOCIAL HISTORY:   Smoked on weekends previously several years ago. No chewing tobacco. Drinks beer on weekends.   .   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       Social History     Socioeconomic History    Marital status:      Spouse name: Not on file    Number of children: Not on file    Years of education: Not on file    Highest education level: Not on file   Occupational History    Not on file   Tobacco Use    Smoking status: Former     Types:  Cigars     Start date: 1973     Quit date: 1990     Years since quittin.0    Smokeless tobacco: Never   Substance and Sexual Activity    Alcohol use: Yes    Drug use: No    Sexual activity: Yes     Partners: Female     Birth control/protection: Pull-out method   Other Topics Concern    Not on file   Social History Narrative    Not on file     Social Determinants of Health     Financial Resource Strain: Not on file   Food Insecurity: Not on file   Transportation Needs: Not on file   Physical Activity: Not on file   Stress: Not on file   Social Connections: Not on file   Interpersonal Safety: Not on file   Housing Stability: Not on file       PAST MEDICAL HISTORY:   Past Medical History:   Diagnosis Date    ADD (attention deficit disorder)     Anxiety     Crohn's disease (H)     Hyperlipidemia     Hypertension     Liposarcoma of lower extremity (H)         PAST SURGICAL HISTORY:   Past Surgical History:   Procedure Laterality Date    DISSECTION RADICAL NECK MODIFIED Left 2/15/2022    Procedure: left modified radical neck dissection.;  Surgeon: Summer Valladares MD;  Location: [facility] OR    EXCISE SOFT TISSUE TUMOR THIGH  2013    Procedure: EXCISE SOFT TISSUE TUMOR THIGH;  Excision of Tumor Bed Left Medial Thigh;  Surgeon: Seymour Jesus MD;  Location: [facility] OR    HERNIA REPAIR      TONSILLECTOMY ADULT Left 2/15/2022    Procedure: left radical tonsillectomy, nasogastric tube placement;  Surgeon: Summer Valladares MD;  Location: [facility] OR    tumor removal of thigh[      vocal cord surgery[      WRIST SURGERY         FAMILY HISTORY:  No family history on file.    REVIEW OF SYSTEMS:  12 point ROS was negative other than the symptoms noted above in the HPI.  Patient Supplied Answers to Review of Systems      2023    11:07 AM    ENT ROS   Constitutional Problems with sleep   Psychology Frequently feeling depressed or sad    Frequently feeling anxious   Ears, Nose, Throat Trouble swallowing  "        PHYSICAL EXAMINATION:   BP (!) 168/94   Pulse 73   Ht 1.727 m (5' 8\")   Wt 79.2 kg (174 lb 11.2 oz)   SpO2 97%   BMI 26.56 kg/m     Appearance:   normal; NAD, age-appropriate appearance, well-developed, normal habitus   Communication:   normal; communicates verbally, normal voice quality   Head/Face:   inspection -  Normal; no scars or visible lesions   Salivary glands -  Normal size, no tenderness, swelling, or palpable masses   Facial strength -  Normal and symmetric    Skin:  normal, no rash   Ears:  auricle (AD) -  Normal  EAC (AD) - cerumen  TM (AD) - unable to visualize  auricle (AS) -  Normal  EAC (AS) - normal  TM (AS) - normal  Normal clinical speech reception   Nose:  Ext. inspection -  Normal  Internal Inspection -  Normal mucosa, septum, and turbinates   Nasopharynx:  normal mucosa, no masses   Oral Cavity:  lips -  Normal mucosa, oral competence, and stoma size   Age-appropriate dentition, healthy gingival mucosa   Hard palate, buccal, floor of mouth mucosa normal   Tongue - normal movement, no lesions, posteriorly the tongue is mildly tethered to the RMT/tonsil defect on the left, no palpable masses, some scar tissue posteriorly on palpation   Oropharynx:  mucosa -  Normal, no lesions  soft palate -  Normal, no lesions, no asymmetry, normal elevation  tonsils -  S/p left radical tonsillectomy with no masses palpable or on visual inspection, no mucosal lesions, only minimal scarring  Base of tongue - normal, no masses or mucosal lesions  Vallecula - clear   Hypopharynx:  Normal pyriform sinus and pharyngeal wall mucosa   No pooled secretions    Larynx:  Epiglottis, AE folds, false vocal cords, true vocal cords, arytenoids normal in appearance, bilaterally mobile cords    Neck: Well healed left neck incision  No palpable masses   Lymphatic:  no abnormal nodes   Cardiovascular:  warm, pink, well-perfused extremities without swelling, tenderness, or edema   Respiratory:  Normal respiratory " effort, no stridor   Neuro/Psych.:  mood/affect -  normal  mental status -  normal       PROCEDURES:   Flexible fiberoptic laryngoscopy: Scope exam was indicated due to tonsil cancer. Verbal consent was obtained. The nasal cavity was prepped with an aerosolized solution of topical anesthetic and vasoconstrictive agent. The scope was passed through the anterior nasal cavity and advanced. Inspection of the nasopharynx revealed no gross abnormality. The base of tongue and vallecula are normal with the exception of the expected postoperative changes to the left base of tongue/tonsil without evidence of residual cancer. The epiglottis, AE folds, false cords, true cords, arytenoids are normal.  Inspection of the larynx revealed bilaterally mobile vocal cords. Pyriform sinuses are symmetric. The airway is patent. Procedure tolerated well with no immediate complications noted.                    RESULTS REVIEWED:   CT neck and chest imaging independently reviewed    CT chest reports reviewed     IMPRESSION AND PLAN:   Flaco Willis is a 68 year old man with a p16+ T1N1 SCC of the left tonsil. He underwent surgical resection with left radical tonsillectomy and left neck dissection on 2/15/2022. He was recommended for no further treatment.    He has no evidence of recurrent disease.     He has first bite syndrome, symptoms stable. He had botox injections with Dr Shaquille Block in December 2023, will continue to follow-up with her.     Repeat imaging today, will send neck CT results to [facility], chest negative. Repeat in 1 year.    I will see him back in 6 months.    Thank you very much for the opportunity to participate in the care of your patient.      Summer Valladares MD  Otolaryngology- Head & Neck Surgery      This note was dictated with voice recognition software and then edited. Please excuse any unintentional errors.             CC:  Ashok Friedman MD  Department of Medical Oncology  [facility]  MD Faisal  Department of Radiation Oncology  [facility]

## 2024-01-10 ENCOUNTER — ANCILLARY PROCEDURE (OUTPATIENT)
Dept: CT IMAGING | Facility: CLINIC | Age: 69
End: 2024-01-10
Attending: OTOLARYNGOLOGY
Payer: COMMERCIAL

## 2024-01-10 ENCOUNTER — OFFICE VISIT (OUTPATIENT)
Dept: OTOLARYNGOLOGY | Facility: CLINIC | Age: 69
End: 2024-01-10
Payer: COMMERCIAL

## 2024-01-10 VITALS
WEIGHT: 174.7 LBS | BODY MASS INDEX: 26.48 KG/M2 | SYSTOLIC BLOOD PRESSURE: 168 MMHG | HEART RATE: 73 BPM | HEIGHT: 68 IN | DIASTOLIC BLOOD PRESSURE: 94 MMHG | OXYGEN SATURATION: 97 %

## 2024-01-10 DIAGNOSIS — C77.0 SECONDARY MALIGNANCY OF LYMPH NODES OF HEAD, FACE AND NECK (H): ICD-10-CM

## 2024-01-10 DIAGNOSIS — C09.9 TONSILLAR CANCER (H): ICD-10-CM

## 2024-01-10 DIAGNOSIS — C09.9 TONSILLAR CANCER (H): Primary | ICD-10-CM

## 2024-01-10 LAB
CREAT BLD-MCNC: 1 MG/DL (ref 0.7–1.3)
EGFRCR SERPLBLD CKD-EPI 2021: >60 ML/MIN/1.73M2

## 2024-01-10 PROCEDURE — 99213 OFFICE O/P EST LOW 20 MIN: CPT | Mod: 25 | Performed by: OTOLARYNGOLOGY

## 2024-01-10 PROCEDURE — 82565 ASSAY OF CREATININE: CPT | Performed by: PATHOLOGY

## 2024-01-10 PROCEDURE — 31575 DIAGNOSTIC LARYNGOSCOPY: CPT | Performed by: OTOLARYNGOLOGY

## 2024-01-10 PROCEDURE — 70491 CT SOFT TISSUE NECK W/DYE: CPT | Performed by: RADIOLOGY

## 2024-01-10 PROCEDURE — 71260 CT THORAX DX C+: CPT | Performed by: RADIOLOGY

## 2024-01-10 RX ORDER — IOPAMIDOL 755 MG/ML
82 INJECTION, SOLUTION INTRAVASCULAR ONCE
Status: COMPLETED | OUTPATIENT
Start: 2024-01-10 | End: 2024-01-10

## 2024-01-10 RX ADMIN — IOPAMIDOL 82 ML: 755 INJECTION, SOLUTION INTRAVASCULAR at 11:37

## 2024-01-10 ASSESSMENT — PAIN SCALES - GENERAL: PAINLEVEL: NO PAIN (0)

## 2024-01-10 NOTE — LETTER
1/10/2024       RE: Flaco Willis III  [address] [place] [address]     Dear Colleague,    Thank you for referring your patient, Flaco Willis III, to the [facility] [place] at [facility]. Please see a copy of my visit note below.    .Dear Dr. Friedman:    I had the pleasure of seeing Flaco Willis III in follow-up today at the [facility].     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 He developed a left neck mass which was evaluated by his PCP and then referred to his local ENT. He had a CT neck performed on 11/16/2021 which showed a 2.5 x 2.0 cm neck mass. He had an U/S of the neck performed on 12/8/2021 which showed a 3.1 x 2.3 x 1.7 cm left neck mass.  He had an FNA of the neck mass on 12/8/2021 which showed p16+ SCC. He had a PET scan on 1/4/2022 which showed focal uptake in the left tonsil with no obvious CT correlate, 2 cm FDG avid left level IIA node, no distant disease. He saw medical oncology at [facility] and was recommended for radiation +/- chemotherapy, recommended against surgery due to the need for postoperative chemoradiation. He then transferred his care to the [place]. He saw Dr Friedman on 1/19/2022 who discussed various treatment options with him. He met with Dr Malone in January 2022 to discuss radiation. He decided to proceed with surgery. He was taken to the OR on 2/15/2022 for left radical tonsillectomy and left neck dissection (levels II-IV). Intraoperatively inferior tonsil margin was positive for SCC. A re-resection was performed. Final pathology demonstrated a p16+ SCC in the tonsil of 2.2 mm focus in the inferior margin, a 7 mm focus of SCC, DOI 1 mm in the inferior tonsil margin, and 2 mm of residual SCC in the re-resected inferior tonsil specimen, with closest margin 1.5 mm. There was a 2.6 cm  metastatic node in level IIA, no IMANI, 18 remaining nodes negative. He developed postoperative first bite syndrome, addressed with botox injections with Dr Shaquille Block.    Interval history:  He comes in today for follow-up. He was last seen in September 2023. He comes in with imaging. He had botox again in December 2023. He says things are going ok. He says things are very similar. He does not notice the scar tissue too much. Most of the time he does not notice it. He says the first bite is the same. He can feel irritation/pain, no significant change. Over time though he thinks it is better. It is not completely resolved. He no longer thinks about it all the time. He is eating a normal diet. He has no sticking of food unless eating too quickly or not chewing enough. He has no pain with swallowing. He feels the neck is doing well. He has no neck masses. He has no ear pain. He is seeing the dentist regularly, no concerns. He has no other changes in his health, seeing PCP regularly. He is staying up to date on other routine health maintenance.       MEDICATIONS:     Current Outpatient Medications   Medication Sig Dispense Refill     aspirin 81 MG tablet Take 81 mg by mouth       atorvastatin (LIPITOR) 20 MG tablet Take 20 mg by mouth daily       busPIRone (BUSPAR) 10 MG tablet Take 10 mg by mouth 2 times daily        Cholecalciferol (VITAMIN D3) 1000 UNITS CAPS Take 1,000 Units by mouth        finasteride (PROPECIA) 1 MG tablet Take 1 mg by mouth daily        melatonin 3 MG tablet Take 1 tablet (3 mg) by mouth nightly as needed for sleep 14 tablet 1     Omega-3 Fatty Acids (FISH OIL PO)        tadalafil (CIALIS) 20 MG tablet Take 20 mg by mouth       VITAMIN E MTC PO          ALLERGIES:    Allergies   Allergen Reactions     Amoxicillin-Pot Clavulanate Rash       HABITS/SOCIAL HISTORY:   Smoked on weekends previously several years ago. No chewing tobacco. Drinks beer on weekends.   .   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       Social History     Socioeconomic History     Marital status:      Spouse name: Not on file     Number of children: Not on file     Years of education: Not on file     Highest education level: Not on file   Occupational History     Not on file   Tobacco Use     Smoking status: Former     Types: Cigars     Start date: 1973     Quit date: 1990     Years since quittin.0     Smokeless tobacco: Never   Substance and Sexual Activity     Alcohol use: Yes     Drug use: No     Sexual activity: Yes     Partners: Female     Birth control/protection: Pull-out method   Other Topics Concern     Not on file   Social History Narrative     Not on file     Social Determinants of Health     Financial Resource Strain: Not on file   Food Insecurity: Not on file   Transportation Needs: Not on file   Physical Activity: Not on file   Stress: Not on file   Social Connections: Not on file   Interpersonal Safety: Not on file   Housing Stability: Not on file       PAST MEDICAL HISTORY:   Past Medical History:   Diagnosis Date     ADD (attention deficit disorder)      Anxiety      Crohn's disease (H)      Hyperlipidemia      Hypertension      Liposarcoma of lower extremity (H)         PAST SURGICAL HISTORY:   Past Surgical History:   Procedure Laterality Date     DISSECTION RADICAL NECK MODIFIED Left 2/15/2022    Procedure: left modified radical neck dissection.;  Surgeon: Summer Valladares MD;  Location: [facility] OR     EXCISE SOFT TISSUE TUMOR THIGH  2013    Procedure: EXCISE SOFT TISSUE TUMOR THIGH;  Excision of Tumor Bed Left Medial Thigh;  Surgeon: Seymour Jesus MD;  Location: [facility] OR     HERNIA REPAIR       TONSILLECTOMY ADULT Left 2/15/2022    Procedure: left radical tonsillectomy, nasogastric tube placement;  Surgeon: Summer Valladares MD;  Location: [facility] OR     tumor removal of thigh[       vocal cord surgery[       WRIST SURGERY         FAMILY HISTORY:  No family history  "on file.    REVIEW OF SYSTEMS:  12 point ROS was negative other than the symptoms noted above in the HPI.  Patient Supplied Answers to Review of Systems      6/21/2023    11:07 AM    ENT ROS   Constitutional Problems with sleep   Psychology Frequently feeling depressed or sad    Frequently feeling anxious   Ears, Nose, Throat Trouble swallowing         PHYSICAL EXAMINATION:   BP (!) 168/94   Pulse 73   Ht 1.727 m (5' 8\")   Wt 79.2 kg (174 lb 11.2 oz)   SpO2 97%   BMI 26.56 kg/m     Appearance:   normal; NAD, age-appropriate appearance, well-developed, normal habitus   Communication:   normal; communicates verbally, normal voice quality   Head/Face:   inspection -  Normal; no scars or visible lesions   Salivary glands -  Normal size, no tenderness, swelling, or palpable masses   Facial strength -  Normal and symmetric    Skin:  normal, no rash   Ears:  auricle (AD) -  Normal  EAC (AD) - cerumen  TM (AD) - unable to visualize  auricle (AS) -  Normal  EAC (AS) - normal  TM (AS) - normal  Normal clinical speech reception   Nose:  Ext. inspection -  Normal  Internal Inspection -  Normal mucosa, septum, and turbinates   Nasopharynx:  normal mucosa, no masses   Oral Cavity:  lips -  Normal mucosa, oral competence, and stoma size   Age-appropriate dentition, healthy gingival mucosa   Hard palate, buccal, floor of mouth mucosa normal   Tongue - normal movement, no lesions, posteriorly the tongue is mildly tethered to the RMT/tonsil defect on the left, no palpable masses, some scar tissue posteriorly on palpation   Oropharynx:  mucosa -  Normal, no lesions  soft palate -  Normal, no lesions, no asymmetry, normal elevation  tonsils -  S/p left radical tonsillectomy with no masses palpable or on visual inspection, no mucosal lesions, only minimal scarring  Base of tongue - normal, no masses or mucosal lesions  Vallecula - clear   Hypopharynx:  Normal pyriform sinus and pharyngeal wall mucosa   No pooled secretions  "   Larynx:  Epiglottis, AE folds, false vocal cords, true vocal cords, arytenoids normal in appearance, bilaterally mobile cords    Neck: Well healed left neck incision  No palpable masses   Lymphatic:  no abnormal nodes   Cardiovascular:  warm, pink, well-perfused extremities without swelling, tenderness, or edema   Respiratory:  Normal respiratory effort, no stridor   Neuro/Psych.:  mood/affect -  normal  mental status -  normal       PROCEDURES:   Flexible fiberoptic laryngoscopy: Scope exam was indicated due to tonsil cancer. Verbal consent was obtained. The nasal cavity was prepped with an aerosolized solution of topical anesthetic and vasoconstrictive agent. The scope was passed through the anterior nasal cavity and advanced. Inspection of the nasopharynx revealed no gross abnormality. The base of tongue and vallecula are normal with the exception of the expected postoperative changes to the left base of tongue/tonsil without evidence of residual cancer. The epiglottis, AE folds, false cords, true cords, arytenoids are normal.  Inspection of the larynx revealed bilaterally mobile vocal cords. Pyriform sinuses are symmetric. The airway is patent. Procedure tolerated well with no immediate complications noted.                    RESULTS REVIEWED:   CT neck and chest imaging independently reviewed    CT chest reports reviewed     IMPRESSION AND PLAN:   Flaco Willis is a 68 year old man with a p16+ T1N1 SCC of the left tonsil. He underwent surgical resection with left radical tonsillectomy and left neck dissection on 2/15/2022. He was recommended for no further treatment.    He has no evidence of recurrent disease.     He has first bite syndrome, symptoms stable. He had botox injections with Dr Shaquille Block in December 2023, will continue to follow-up with her.     Repeat imaging today[facility]. Repeat in 1 year.    I will see him back in 6 months.    Thank you very much  for the opportunity to participate in the care of your patient.      Summer Valladares MD  Otolaryngology- Head & Neck Surgery      This note was dictated with voice recognition software and then edited. Please excuse any unintentional errors.             CC:  Ashok Friedman MD  Department of Medical Oncology  [facility]      Alcides Malone MD  Department of Radiation Oncology  [facility]      Again, thank you for allowing me to participate in the care of your patient.      Sincerely,    Summer Valladares MD

## 2024-01-10 NOTE — DISCHARGE INSTRUCTIONS

## 2024-02-12 ENCOUNTER — TELEPHONE (OUTPATIENT)
Dept: OTOLARYNGOLOGY | Facility: CLINIC | Age: 69
End: 2024-02-12
Payer: COMMERCIAL

## 2024-02-12 NOTE — TELEPHONE ENCOUNTER
Called patient to review CT chest results. Patient stated that he has been experiencing chest pain with exercise. Patient wondering if CT was able to determine cause of chest pain. Patient dicussed with PCP who recommended that patient be evaluated in the ER.   Reviewed with patient that on most recent CT chest there was nothing found in the hear. Discussed that CT chest is not a specific cardiac test and he may need additional cardiac workup. Patient verbalized understanding. Patient is going to [facility] ER to be evaluated further. Patient will call clinic with further questions or concerns.    Kadie UGALDEN, RN

## 2024-02-12 NOTE — TELEPHONE ENCOUNTER
[facility] Call Center    Phone Message    May a detailed message be left on voicemail: yes     Reason for Call: Other: Pt called in regards to his previous CT. He has some questions about what the scan covered in terms of the chest. Please call pt back to clarify his questions/concerns      Action Taken: Other: [facility]    Travel Screening: Not Applicable

## 2024-02-15 NOTE — TUMOR CONFERENCE
Tumor Conference Information  Tumor Conference: ENT  Specialties Present: Medical oncology, Radiation oncology, Pathology, Radiology, Surgery  Patient Status: Prospective  Stage: T1N1  Treatment to Date: Biopsy  Clinical Trials: Not discussed  Genetic Testing Discussed/Recommended?: No  Supportive Care Services Discussed/Recommended?: No  Recommended Plan: Follows evidence-based guidelines  Did the review exceed 30 minutes?: did not           Documentation / Disclaimer Cancer Tumor Board Note  Cancer tumor board recommendations do not override what is determined to be reasonable care and treatment, which is dependent on the circumstances of a patient's case; the patient's medical, social, and personal concerns; and the clinical judgment of the oncologist [physician].    
6 months to 35 months (need ONE to TWO doses)...

## 2024-03-12 ENCOUNTER — TRANSCRIBE ORDERS (OUTPATIENT)
Dept: OTHER | Age: 69
End: 2024-03-12

## 2024-03-12 DIAGNOSIS — R94.39 ABNORMAL NUCLEAR STRESS TEST: Primary | ICD-10-CM

## 2024-03-19 ENCOUNTER — HOSPITAL ENCOUNTER (OUTPATIENT)
Dept: CARDIAC REHAB | Facility: CLINIC | Age: 69
Discharge: HOME | End: 2024-03-19
Attending: INTERNAL MEDICINE
Payer: COMMERCIAL

## 2024-03-19 PROCEDURE — 93797 PHYS/QHP OP CAR RHAB WO ECG: CPT | Mod: 59 | Performed by: OCCUPATIONAL THERAPIST

## 2024-03-19 PROCEDURE — 93798 PHYS/QHP OP CAR RHAB W/ECG: CPT | Performed by: OCCUPATIONAL THERAPIST

## 2024-05-10 ENCOUNTER — OFFICE VISIT (OUTPATIENT)
Dept: PLASTIC SURGERY | Facility: CLINIC | Age: 69
End: 2024-05-10

## 2024-05-10 DIAGNOSIS — G50.1 ATYPICAL FACIAL PAIN: Primary | ICD-10-CM

## 2024-06-06 NOTE — PROGRESS NOTES
*PCF OU; BECOMING VISUALLY SIGNIFICANT BUT NOT BOTHERSOME TO PATIENT AT THIS TIME. CONTINUE TO FOLLOW FOR NOW. OFFER SPEC RX UPDATE. Facial Plastic and Reconstructive Surgery    Procedure: Chemodenervation with Botulinum Toxin A  Indication: First bite syndrome  Injector: Anette Keane MD      Informed consent was obtained.  The skin was cleaned with antimicrobial solution and a topical ice was placed.     The patient was asked to systematically engage the muscles in the area to be injected. The tuberculin needles were used for subdermal injection and hemostasis was obtained with light digital pressure when needed. The skin was cleaned.     A total of 15 units were injected.  Botulinum toxin A type: Botox    Please see procedure log.    The patient tolerated the procedure well and there were no complications. Post procedure care instructions were given to the patient.

## 2024-07-08 NOTE — PROGRESS NOTES
.Dear Dr. Friedman:    I had the pleasure of seeing Flaco Willis III in follow-up today at the [facility].     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 He developed a left neck mass which was evaluated by his PCP and then referred to his local ENT. He had a CT neck performed on 11/16/2021 which showed a 2.5 x 2.0 cm neck mass. He had an U/S of the neck performed on 12/8/2021 which showed a 3.1 x 2.3 x 1.7 cm left neck mass.  He had an FNA of the neck mass on 12/8/2021 which showed p16+ SCC. He had a PET scan on 1/4/2022 which showed focal uptake in the left tonsil with no obvious CT correlate, 2 cm FDG avid left level IIA node, no distant disease. He saw medical oncology at [facility] and was recommended for radiation +/- chemotherapy, recommended against surgery due to the need for postoperative chemoradiation. He then transferred his care to the [place]. He saw Dr Friedman on 1/19/2022 who discussed various treatment options with him. He met with Dr Malone in January 2022 to discuss radiation. He decided to proceed with surgery. He was taken to the OR on 2/15/2022 for left radical tonsillectomy and left neck dissection (levels II-IV). Intraoperatively inferior tonsil margin was positive for SCC. A re-resection was performed. Final pathology demonstrated a p16+ SCC in the tonsil of 2.2 mm focus in the inferior margin, a 7 mm focus of SCC, DOI 1 mm in the inferior tonsil margin, and 2 mm of residual SCC in the re-resected inferior tonsil specimen, with closest margin 1.5 mm. There was a 2.6 cm metastatic node in level IIA, no IMANI, 18 remaining nodes negative. He developed postoperative first bite syndrome, addressed with botox injections with Dr Shaquille Block.    Interval history:  He comes in today for follow-up. He was last seen in January 2024. He had cardiac stent placement in February 2024. He had repeat botox  injection in May 2024. He says things are going ok. He says the shoulder is not really an issue unless he leans on it in a particular way. He has a little first bite. He says about half the time he does not even think about it. He says the episodes when they happen are brief. He is getting less frequent botox for it. He brings up the scar tissue in his throat which he feels is more noticeable at times. He has no sore throat. He is eating a normal diet. He does not avoid any foods. He has sticking of food - thinks this is in the surgical site. He does not have sticking further down. He can eat steak without issue. He think the sticking happens more with eating fast. His weight is down a little. He has no neck masses.       MEDICATIONS:     Current Outpatient Medications   Medication Sig Dispense Refill    aspirin 81 MG tablet Take 81 mg by mouth      atorvastatin (LIPITOR) 20 MG tablet Take 20 mg by mouth daily      busPIRone (BUSPAR) 10 MG tablet Take 10 mg by mouth 2 times daily       Cholecalciferol (VITAMIN D3) 1000 UNITS CAPS Take 1,000 Units by mouth       finasteride (PROPECIA) 1 MG tablet Take 1 mg by mouth daily       melatonin 3 MG tablet Take 1 tablet (3 mg) by mouth nightly as needed for sleep 14 tablet 1    Omega-3 Fatty Acids (FISH OIL PO)       tadalafil (CIALIS) 20 MG tablet Take 20 mg by mouth      VITAMIN E MTC PO          ALLERGIES:    Allergies   Allergen Reactions    Amoxicillin-Pot Clavulanate Rash       HABITS/SOCIAL HISTORY:   Smoked on weekends previously several years ago. No chewing tobacco. Drinks beer on weekends.   .   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       Social History     Socioeconomic History    Marital status:      Spouse name: Not on file    Number of children: Not on file    Years of education: Not on file    Highest education level: Not on file   Occupational History    Not on file   Tobacco Use    Smoking status: Former     Types:  Cigars     Start date: 1973     Quit date: 1990     Years since quittin.5    Smokeless tobacco: Never   Substance and Sexual Activity    Alcohol use: Yes    Drug use: No    Sexual activity: Yes     Partners: Female     Birth control/protection: Pull-out method   Other Topics Concern    Not on file   Social History Narrative    Not on file     Social Determinants of Health     Financial Resource Strain: High Risk (2021)    [facility], [facility]    Financial Resource Strain     Difficulty of Paying Living Expenses: Not on file     Difficulty of Paying Living Expenses: Not on file   Food Insecurity: Not on file   Transportation Needs: Not on file   Physical Activity: Not on file   Stress: Not on file   Social Connections: Unknown (2021)    Received from [facility], [facility]    Social Connections     Frequency of Communication with Friends and Family: Not on file   Interpersonal Safety: Not on file   Housing Stability: Not on file       PAST MEDICAL HISTORY:   Past Medical History:   Diagnosis Date    ADD (attention deficit disorder)     Anxiety     Crohn's disease (H)     Hyperlipidemia     Hypertension     Liposarcoma of lower extremity (H)         PAST SURGICAL HISTORY:   Past Surgical History:   Procedure Laterality Date    DISSECTION RADICAL NECK MODIFIED Left 2/15/2022    Procedure: left modified radical neck dissection.;  Surgeon: Summer Valladares MD;  Location: [facility] OR    EXCISE SOFT TISSUE TUMOR THIGH  2013    Procedure: EXCISE SOFT TISSUE TUMOR THIGH;  Excision of Tumor Bed Left Medial Thigh;  Surgeon: Seymour Jesus MD;  Location: [facility] OR    HERNIA REPAIR      TONSILLECTOMY ADULT Left 2/15/2022    Procedure: left radical tonsillectomy, nasogastric tube placement;  Surgeon: Summer Valladares MD;  Location: [facility] OR    tumor  "removal of thigh[      vocal cord surgery[      WRIST SURGERY         FAMILY HISTORY:  No family history on file.    REVIEW OF SYSTEMS:  12 point ROS was negative other than the symptoms noted above in the HPI.  Patient Supplied Answers to Review of Systems      6/21/2023    11:07 AM    ENT ROS   Constitutional Problems with sleep   Psychology Frequently feeling depressed or sad    Frequently feeling anxious   Ears, Nose, Throat Trouble swallowing         PHYSICAL EXAMINATION:   BP (!) 189/80   Pulse 69   Ht 1.727 m (5' 8\")   Wt 75.9 kg (167 lb 6.4 oz)   SpO2 96%   BMI 25.45 kg/m     Appearance:   normal; NAD, age-appropriate appearance, well-developed, normal habitus   Communication:   normal; communicates verbally, normal voice quality   Head/Face:   inspection -  Normal; no scars or visible lesions   Salivary glands -  Normal size, no tenderness, swelling, or palpable masses   Facial strength -  Normal and symmetric    Skin:  normal, no rash   Ears:  auricle (AD) -  Normal  EAC (AD) - cerumen  TM (AD) - normal  auricle (AS) -  Normal  EAC (AS) - normal  TM (AS) - normal  Normal clinical speech reception   Nose:  Ext. inspection -  Normal  Internal Inspection -  Normal mucosa, septum, and turbinates   Nasopharynx:  normal mucosa, no masses   Oral Cavity:  lips -  Normal mucosa, oral competence, and stoma size   Age-appropriate dentition, healthy gingival mucosa   Hard palate, buccal, floor of mouth mucosa normal   Tongue - normal movement, no lesions, posteriorly the tongue is mildly tethered to the RMT/tonsil defect on the left, no palpable masses, some scar tissue posteriorly on palpation   Oropharynx:  mucosa -  Normal, no lesions  soft palate -  Normal, no lesions, no asymmetry, normal elevation  tonsils -  S/p left radical tonsillectomy with no masses palpable or on visual inspection, no mucosal lesions, only minimal scarring  Base of tongue - normal, no masses or mucosal lesions  Vallecula - clear "   Hypopharynx:  Normal pyriform sinus and pharyngeal wall mucosa   No pooled secretions    Larynx:  Epiglottis, AE folds, false vocal cords, true vocal cords, arytenoids normal in appearance, bilaterally mobile cords    Neck: Well healed left neck incision  No palpable masses   Lymphatic:  no abnormal nodes   Cardiovascular:  warm, pink, well-perfused extremities without swelling, tenderness, or edema   Respiratory:  Normal respiratory effort, no stridor   Neuro/Psych.:  mood/affect -  normal  mental status -  normal       PROCEDURES:   Flexible fiberoptic laryngoscopy: Scope exam was indicated due to tonsil cancer. Verbal consent was obtained. The nasal cavity was prepped with an aerosolized solution of topical anesthetic and vasoconstrictive agent. The scope was passed through the anterior nasal cavity and advanced. Inspection of the nasopharynx revealed no gross abnormality. The base of tongue and vallecula are normal with the exception of the expected postoperative changes to the left base of tongue/tonsil without evidence of residual cancer. The epiglottis, AE folds, false cords, true cords, arytenoids are normal.  Inspection of the larynx revealed bilaterally mobile vocal cords. Pyriform sinuses are symmetric. The airway is patent. Procedure tolerated well with no immediate complications noted.      RESULTS REVIEWED:   I reviewed note from Dr Shaquille Block    I reviewed note from PCP    IMPRESSION AND PLAN:   Flaco Willis is a 69 year old man with a p16+ T1N1 SCC of the left tonsil. He underwent surgical resection with left radical tonsillectomy and left neck dissection on 2/15/2022. He was recommended for no further treatment.    He has no evidence of recurrent disease.     He has first bite syndrome, symptoms stable. He had botox injections with Dr Shaquille Block in May 2024, will continue to follow-up with her with regards to need for botox.     Repeat imaging in 6 months.    I will see him back in 6 months  with scans.    Thank you very much for the opportunity to participate in the care of your patient.      Summer Valladares MD  Otolaryngology- Head & Neck Surgery      This note was dictated with voice recognition software and then edited. Please excuse any unintentional errors.             CC:  Ashok Friedman MD  Department of Medical Oncology  [facility]      Alcides Malone MD  Department of Radiation Oncology  [facility]

## 2024-07-10 ENCOUNTER — OFFICE VISIT (OUTPATIENT)
Dept: OTOLARYNGOLOGY | Facility: CLINIC | Age: 69
End: 2024-07-10
Payer: COMMERCIAL

## 2024-07-10 VITALS
WEIGHT: 167.4 LBS | DIASTOLIC BLOOD PRESSURE: 80 MMHG | HEIGHT: 68 IN | OXYGEN SATURATION: 96 % | SYSTOLIC BLOOD PRESSURE: 189 MMHG | BODY MASS INDEX: 25.37 KG/M2 | HEART RATE: 69 BPM

## 2024-07-10 DIAGNOSIS — C10.9 MALIGNANT NEOPLASM OF OROPHARYNX (H): ICD-10-CM

## 2024-07-10 DIAGNOSIS — C77.0 SECONDARY MALIGNANCY OF LYMPH NODES OF HEAD, FACE AND NECK (H): Primary | ICD-10-CM

## 2024-07-10 PROCEDURE — 31575 DIAGNOSTIC LARYNGOSCOPY: CPT | Performed by: OTOLARYNGOLOGY

## 2024-07-10 PROCEDURE — 99213 OFFICE O/P EST LOW 20 MIN: CPT | Mod: 25 | Performed by: OTOLARYNGOLOGY

## 2024-07-10 ASSESSMENT — PAIN SCALES - GENERAL: PAINLEVEL: NO PAIN (0)

## 2024-07-10 NOTE — LETTER
7/10/2024       RE: Flaco Willis III  [address] [place] [address]     Dear Colleague,    Thank you for referring your patient, Flaco Willis III, to the [facility] [place] at [facility]. Please see a copy of my visit note below.    .Dear Dr. Friedman:    I had the pleasure of seeing Flaco Willis III in follow-up today at the [facility].     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 He developed a left neck mass which was evaluated by his PCP and then referred to his local ENT. He had a CT neck performed on 11/16/2021 which showed a 2.5 x 2.0 cm neck mass. He had an U/S of the neck performed on 12/8/2021 which showed a 3.1 x 2.3 x 1.7 cm left neck mass.  He had an FNA of the neck mass on 12/8/2021 which showed p16+ SCC. He had a PET scan on 1/4/2022 which showed focal uptake in the left tonsil with no obvious CT correlate, 2 cm FDG avid left level IIA node, no distant disease. He saw medical oncology at [facility] and was recommended for radiation +/- chemotherapy, recommended against surgery due to the need for postoperative chemoradiation. He then transferred his care to the [place]. He saw Dr Friedman on 1/19/2022 who discussed various treatment options with him. He met with Dr Malone in January 2022 to discuss radiation. He decided to proceed with surgery. He was taken to the OR on 2/15/2022 for left radical tonsillectomy and left neck dissection (levels II-IV). Intraoperatively inferior tonsil margin was positive for SCC. A re-resection was performed. Final pathology demonstrated a p16+ SCC in the tonsil of 2.2 mm focus in the inferior margin, a 7 mm focus of SCC, DOI 1 mm in the inferior tonsil margin, and 2 mm of residual SCC in the re-resected inferior tonsil specimen, with closest margin 1.5 mm. There was a 2.6 cm  metastatic node in level IIA, no IMANI, 18 remaining nodes negative. He developed postoperative first bite syndrome, addressed with botox injections with Dr Shaquille Block.    Interval history:  He comes in today for follow-up. He was last seen in January 2024. He had cardiac stent placement in February 2024. He had repeat botox injection in May 2024. He says things are going ok. He says the shoulder is not really an issue unless he leans on it in a particular way. He has a little first bite. He says about half the time he does not even think about it. He says the episodes when they happen are brief. He is getting less frequent botox for it. He brings up the scar tissue in his throat which he feels is more noticeable at times. He has no sore throat. He is eating a normal diet. He does not avoid any foods. He has sticking of food - thinks this is in the surgical site. He does not have sticking further down. He can eat steak without issue. He think the sticking happens more with eating fast. His weight is down a little. He has no neck masses.       MEDICATIONS:     Current Outpatient Medications   Medication Sig Dispense Refill    aspirin 81 MG tablet Take 81 mg by mouth      atorvastatin (LIPITOR) 20 MG tablet Take 20 mg by mouth daily      busPIRone (BUSPAR) 10 MG tablet Take 10 mg by mouth 2 times daily       Cholecalciferol (VITAMIN D3) 1000 UNITS CAPS Take 1,000 Units by mouth       finasteride (PROPECIA) 1 MG tablet Take 1 mg by mouth daily       melatonin 3 MG tablet Take 1 tablet (3 mg) by mouth nightly as needed for sleep 14 tablet 1    Omega-3 Fatty Acids (FISH OIL PO)       tadalafil (CIALIS) 20 MG tablet Take 20 mg by mouth      VITAMIN E MTC PO          ALLERGIES:    Allergies   Allergen Reactions    Amoxicillin-Pot Clavulanate Rash       HABITS/SOCIAL HISTORY:   Smoked on weekends previously several years ago. No chewing tobacco. Drinks beer on weekends.   .   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       Social History     Socioeconomic History    Marital status:      Spouse name: Not on file    Number of children: Not on file    Years of education: Not on file    Highest education level: Not on file   Occupational History    Not on file   Tobacco Use    Smoking status: Former     Types: Cigars     Start date: 1973     Quit date: 1990     Years since quittin.5    Smokeless tobacco: Never   Substance and Sexual Activity    Alcohol use: Yes    Drug use: No    Sexual activity: Yes     Partners: Female     Birth control/protection: Pull-out method   Other Topics Concern    Not on file   Social History Narrative    Not on file     Social Determinants of Health     Financial Resource Strain: High Risk (2021)    [facility], [facility]    Financial Resource Strain     Difficulty of Paying Living Expenses: Not on file     Difficulty of Paying Living Expenses: Not on file   Food Insecurity: Not on file   Transportation Needs: Not on file   Physical Activity: Not on file   Stress: Not on file   Social Connections: Unknown (2021)    [facility] from [facility], [facility]    Social Connections     Frequency of Communication with Friends and Family: Not on file   Interpersonal Safety: Not on file   Housing Stability: Not on file       PAST MEDICAL HISTORY:   Past Medical History:   Diagnosis Date    ADD (attention deficit disorder)     Anxiety     Crohn's disease (H)     Hyperlipidemia     Hypertension     Liposarcoma of lower extremity (H)         PAST SURGICAL HISTORY:   Past Surgical History:   Procedure Laterality Date    DISSECTION RADICAL NECK MODIFIED Left 2/15/2022    Procedure: left modified radical neck dissection.;  Surgeon: Summer Valladares MD;  Location: [facility] OR    EXCISE SOFT TISSUE TUMOR THIGH  2013     "Procedure: EXCISE SOFT TISSUE TUMOR THIGH;  Excision of Tumor Bed Left Medial Thigh;  Surgeon: Seymour Jesus MD;  Location: [facility] OR    HERNIA REPAIR      TONSILLECTOMY ADULT Left 2/15/2022    Procedure: left radical tonsillectomy, nasogastric tube placement;  Surgeon: Summer Valladares MD;  Location: [facility] OR    tumor removal of thigh[      vocal cord surgery[      WRIST SURGERY         FAMILY HISTORY:  No family history on file.    REVIEW OF SYSTEMS:  12 point ROS was negative other than the symptoms noted above in the HPI.  Patient Supplied Answers to Review of Systems      6/21/2023    11:07 AM    ENT ROS   Constitutional Problems with sleep   Psychology Frequently feeling depressed or sad    Frequently feeling anxious   Ears, Nose, Throat Trouble swallowing         PHYSICAL EXAMINATION:   BP (!) 189/80   Pulse 69   Ht 1.727 m (5' 8\")   Wt 75.9 kg (167 lb 6.4 oz)   SpO2 96%   BMI 25.45 kg/m     Appearance:   normal; NAD, age-appropriate appearance, well-developed, normal habitus   Communication:   normal; communicates verbally, normal voice quality   Head/Face:   inspection -  Normal; no scars or visible lesions   Salivary glands -  Normal size, no tenderness, swelling, or palpable masses   Facial strength -  Normal and symmetric    Skin:  normal, no rash   Ears:  auricle (AD) -  Normal  EAC (AD) - cerumen  TM (AD) - normal  auricle (AS) -  Normal  EAC (AS) - normal  TM (AS) - normal  Normal clinical speech reception   Nose:  Ext. inspection -  Normal  Internal Inspection -  Normal mucosa, septum, and turbinates   Nasopharynx:  normal mucosa, no masses   Oral Cavity:  lips -  Normal mucosa, oral competence, and stoma size   Age-appropriate dentition, healthy gingival mucosa   Hard palate, buccal, floor of mouth mucosa normal   Tongue - normal movement, no lesions, posteriorly the tongue is mildly tethered to the RMT/tonsil defect on the left, no palpable masses, some scar tissue posteriorly on " palpation   Oropharynx:  mucosa -  Normal, no lesions  soft palate -  Normal, no lesions, no asymmetry, normal elevation  tonsils -  S/p left radical tonsillectomy with no masses palpable or on visual inspection, no mucosal lesions, only minimal scarring  Base of tongue - normal, no masses or mucosal lesions  Vallecula - clear   Hypopharynx:  Normal pyriform sinus and pharyngeal wall mucosa   No pooled secretions    Larynx:  Epiglottis, AE folds, false vocal cords, true vocal cords, arytenoids normal in appearance, bilaterally mobile cords    Neck: Well healed left neck incision  No palpable masses   Lymphatic:  no abnormal nodes   Cardiovascular:  warm, pink, well-perfused extremities without swelling, tenderness, or edema   Respiratory:  Normal respiratory effort, no stridor   Neuro/Psych.:  mood/affect -  normal  mental status -  normal       PROCEDURES:   Flexible fiberoptic laryngoscopy: Scope exam was indicated due to tonsil cancer. Verbal consent was obtained. The nasal cavity was prepped with an aerosolized solution of topical anesthetic and vasoconstrictive agent. The scope was passed through the anterior nasal cavity and advanced. Inspection of the nasopharynx revealed no gross abnormality. The base of tongue and vallecula are normal with the exception of the expected postoperative changes to the left base of tongue/tonsil without evidence of residual cancer. The epiglottis, AE folds, false cords, true cords, arytenoids are normal.  Inspection of the larynx revealed bilaterally mobile vocal cords. Pyriform sinuses are symmetric. The airway is patent. Procedure tolerated well with no immediate complications noted.      RESULTS REVIEWED:   I reviewed note from Dr Shaquille Block    I reviewed note from PCP    IMPRESSION AND PLAN:   Flaco Willis is a 69 year old man with a p16+ T1N1 SCC of the left tonsil. He underwent surgical resection with left radical tonsillectomy and left neck dissection on 2/15/2022. He  was recommended for no further treatment.    He has no evidence of recurrent disease.     He has first bite syndrome, symptoms stable. He had botox injections with Dr Shaquille Block in May 2024, will continue to follow-up with her with regards to need for botox.     Repeat imaging in 6 months.    I will see him back in 6 months with scans.    Thank you very much for the opportunity to participate in the care of your patient.      Summer Valladares MD  Otolaryngology- Head & Neck Surgery      This note was dictated with voice recognition software and then edited. Please excuse any unintentional errors.             CC:  Ashok Friedman MD  Department of Medical Oncology  [facility]      Alcides Malone MD  Department of Radiation Oncology  [facility]

## 2024-08-10 ENCOUNTER — HEALTH MAINTENANCE LETTER (OUTPATIENT)
Age: 69
End: 2024-08-10

## 2025-01-07 NOTE — PROGRESS NOTES
.Dear Dr. Friedman:    I had the pleasure of seeing Flaco Willis III in follow-up today at the [facility].     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 He developed a left neck mass which was evaluated by his PCP and then referred to his local ENT. He had a CT neck performed on 11/16/2021 which showed a 2.5 x 2.0 cm neck mass. He had an U/S of the neck performed on 12/8/2021 which showed a 3.1 x 2.3 x 1.7 cm left neck mass.  He had an FNA of the neck mass on 12/8/2021 which showed p16+ SCC. He had a PET scan on 1/4/2022 which showed focal uptake in the left tonsil with no obvious CT correlate, 2 cm FDG avid left level IIA node, no distant disease. He saw medical oncology at [facility] and was recommended for radiation +/- chemotherapy, recommended against surgery due to the need for postoperative chemoradiation. He then transferred his care to the [place]. He saw Dr Friedman on 1/19/2022 who discussed various treatment options with him. He met with Dr Malone in January 2022 to discuss radiation. He decided to proceed with surgery. He was taken to the OR on 2/15/2022 for left radical tonsillectomy and left neck dissection (levels II-IV). Intraoperatively inferior tonsil margin was positive for SCC. A re-resection was performed. Final pathology demonstrated a p16+ SCC in the tonsil of 2.2 mm focus in the inferior margin, a 7 mm focus of SCC, DOI 1 mm in the inferior tonsil margin, and 2 mm of residual SCC in the re-resected inferior tonsil specimen, with closest margin 1.5 mm. There was a 2.6 cm metastatic node in level IIA, no IMANI, 18 remaining nodes negative. He developed postoperative first bite syndrome, addressed with botox injections with Dr Shaquille Block.    Interval history:  He comes in today for follow-up. He was last seen in July 2024. He comes in with repeat scans. He says its been awhile since he had botox  injections. He says insurance does not cover it. He gets a little of the first bite but the episodes are brief. He does not notice it much anymore, almost has to remind himself of it. He says his throat is doing ok. He has no problems with swallowing unless he really focus and thinks about it. He is less noticing the scar tissue in his throat that previously bothered him. He has no sore throat. He has not had any colds. He says his weight is maybe up a little. He has no ear pain. He has no neck masses.       MEDICATIONS:     Current Outpatient Medications   Medication Sig Dispense Refill    aspirin 81 MG tablet Take 81 mg by mouth      atorvastatin (LIPITOR) 20 MG tablet Take 20 mg by mouth daily      busPIRone (BUSPAR) 10 MG tablet Take 10 mg by mouth 2 times daily       Cholecalciferol (VITAMIN D3) 1000 UNITS CAPS Take 1,000 Units by mouth       finasteride (PROPECIA) 1 MG tablet Take 1 mg by mouth daily       melatonin 3 MG tablet Take 1 tablet (3 mg) by mouth nightly as needed for sleep 14 tablet 1    Omega-3 Fatty Acids (FISH OIL PO)       tadalafil (CIALIS) 20 MG tablet Take 20 mg by mouth      VITAMIN E MTC PO          ALLERGIES:    Allergies   Allergen Reactions    Amoxicillin-Pot Clavulanate Rash       HABITS/SOCIAL HISTORY:   Smoked on weekends previously several years ago. No chewing tobacco. Drinks beer on weekends.   .   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       Social History     Socioeconomic History    Marital status:      Spouse name: Not on file    Number of children: Not on file    Years of education: Not on file    Highest education level: Not on file   Occupational History    Not on file   Tobacco Use    Smoking status: Former     Types: Cigars     Start date: 1973     Quit date: 1990     Years since quittin.0    Smokeless tobacco: Never   Substance and Sexual Activity    Alcohol use: Yes    Drug use: No    Sexual activity: Yes     Partners:  Female     Birth control/protection: Pull-out method   Other Topics Concern    Not on file   Social History Narrative    Not on file     Social Drivers of Health     Financial Resource Strain: High Risk (12/23/2021)    [facility], [facility]    Financial Resource Strain     Difficulty of Paying Living Expenses: Not on file     Difficulty of Paying Living Expenses: Not on file   Food Insecurity: Not on file   Transportation Needs: Not on file   Physical Activity: Not on file   Stress: Not on file   Social Connections: Unknown (12/23/2021)    Received from [employer] [facility], [facility]    Social Connections     Frequency of Communication with Friends and Family: Not on file   Interpersonal Safety: Not on file   Housing Stability: Not on file       PAST MEDICAL HISTORY:   Past Medical History:   Diagnosis Date    ADD (attention deficit disorder)     Anxiety     Crohn's disease (H)     Hyperlipidemia     Hypertension     Liposarcoma of lower extremity (H)         PAST SURGICAL HISTORY:   Past Surgical History:   Procedure Laterality Date    DISSECTION RADICAL NECK MODIFIED Left 2/15/2022    Procedure: left modified radical neck dissection.;  Surgeon: Summer Valladares MD;  Location: [facility] OR    EXCISE SOFT TISSUE TUMOR THIGH  11/21/2013    Procedure: EXCISE SOFT TISSUE TUMOR THIGH;  Excision of Tumor Bed Left Medial Thigh;  Surgeon: Seymour Jesus MD;  Location: [facility] OR    HERNIA REPAIR      TONSILLECTOMY ADULT Left 2/15/2022    Procedure: left radical tonsillectomy, nasogastric tube placement;  Surgeon: Summer Valladares MD;  Location: [facility] OR    tumor removal of thigh[      vocal cord surgery[      WRIST SURGERY         FAMILY HISTORY:  No family history on file.    REVIEW OF SYSTEMS:  12 point ROS was negative other than the symptoms noted above in the HPI.  Patient  "Supplied Answers to Review of Systems      6/21/2023    11:07 AM    ENT ROS   Constitutional Problems with sleep   Psychology Frequently feeling depressed or sad    Frequently feeling anxious   Ears, Nose, Throat Trouble swallowing         PHYSICAL EXAMINATION:   BP (!) 185/98 (BP Location: Right arm, Patient Position: Sitting, Cuff Size: Adult Regular)   Pulse 59   Ht 1.727 m (5' 8\")   Wt 79.4 kg (175 lb)   BMI 26.61 kg/m     Appearance:   normal; NAD, age-appropriate appearance, well-developed, normal habitus   Communication:   normal; communicates verbally, normal voice quality   Head/Face:   inspection -  Normal; no scars or visible lesions   Salivary glands -  Normal size, no tenderness, swelling, or palpable masses   Facial strength -  Normal and symmetric    Skin:  normal, no rash   Ears:  auricle (AD) -  Normal  EAC (AD) - cerumen  TM (AD) - normal  auricle (AS) -  Normal  EAC (AS) - normal  TM (AS) - normal  Normal clinical speech reception   Nose:  Ext. inspection -  Normal  Internal Inspection -  Normal mucosa, septum, and turbinates   Nasopharynx:  normal mucosa, no masses   Oral Cavity:  lips -  Normal mucosa, oral competence, and stoma size   Age-appropriate dentition, healthy gingival mucosa   Hard palate, buccal, floor of mouth mucosa normal   Tongue - normal movement, no lesions, posteriorly the tongue is mildly tethered to the RMT/tonsil defect on the left, no palpable masses, some scar tissue posteriorly on palpation   Oropharynx:  mucosa -  Normal, no lesions  soft palate -  Normal, no lesions, no asymmetry, normal elevation  tonsils -  S/p left radical tonsillectomy with no masses palpable or on visual inspection, no mucosal lesions, only minimal scarring  Base of tongue - normal, no masses or mucosal lesions  Vallecula - clear   Hypopharynx:  Normal pyriform sinus and pharyngeal wall mucosa   No pooled secretions    Larynx:  Epiglottis, AE folds, false vocal cords, true vocal cords, " arytenoids normal in appearance, bilaterally mobile cords    Neck: Well healed left neck incision  No palpable masses   Lymphatic:  no abnormal nodes   Cardiovascular:  warm, pink, well-perfused extremities without swelling, tenderness, or edema   Respiratory:  Normal respiratory effort, no stridor   Neuro/Psych.:  mood/affect -  normal  mental status -  normal       PROCEDURES:   Flexible fiberoptic laryngoscopy: Scope exam was indicated due to tonsil cancer. Verbal consent was obtained. The nasal cavity was prepped with an aerosolized solution of topical anesthetic and vasoconstrictive agent. The scope was passed through the anterior nasal cavity and advanced. Inspection of the nasopharynx revealed no gross abnormality. The base of tongue and vallecula are normal with the exception of the expected postoperative changes to the left base of tongue/tonsil without evidence of residual cancer. The epiglottis, AE folds, false cords, true cords, arytenoids are normal.  Inspection of the larynx revealed bilaterally mobile vocal cords. Pyriform sinuses are symmetric. The airway is patent. Procedure tolerated well with no immediate complications noted.                  RESULTS REVIEWED:   CT neck and chest imaging independently reviewed     CT chest reports reviewed       IMPRESSION AND PLAN:   Flaco Willis is a 69 year old man with a p16+ T1N1 SCC of the left tonsil. He underwent surgical resection with left radical tonsillectomy and left neck dissection on 2/15/2022. He was recommended for no further treatment.    He has no evidence of recurrent disease.     He has first bite syndrome, symptoms improved and minimal. He had botox injections with Dr Shaquille Block in May 2024, will continue to follow-up with her with regards to need for botox.     Repeat imaging today. Will send neck CT results to [facility]. Pending final results repeat in 1 year.    I will see him back in 6 months.    Thank you very much for the opportunity to  participate in the care of your patient.      Summer Valladares MD  Otolaryngology- Head & Neck Surgery      This note was dictated with voice recognition software and then edited. Please excuse any unintentional errors.             CC:  Ashok Friedman MD  Department of Medical Oncology  [facility]      Alcides Malone MD  Department of Radiation Oncology  [facility]

## 2025-01-08 ENCOUNTER — ANCILLARY PROCEDURE (OUTPATIENT)
Dept: CT IMAGING | Facility: CLINIC | Age: 70
End: 2025-01-08
Attending: OTOLARYNGOLOGY
Payer: COMMERCIAL

## 2025-01-08 ENCOUNTER — OFFICE VISIT (OUTPATIENT)
Dept: OTOLARYNGOLOGY | Facility: CLINIC | Age: 70
End: 2025-01-08
Payer: COMMERCIAL

## 2025-01-08 VITALS
HEIGHT: 68 IN | BODY MASS INDEX: 26.52 KG/M2 | DIASTOLIC BLOOD PRESSURE: 98 MMHG | WEIGHT: 175 LBS | SYSTOLIC BLOOD PRESSURE: 185 MMHG | HEART RATE: 59 BPM

## 2025-01-08 DIAGNOSIS — C77.0 SECONDARY MALIGNANCY OF LYMPH NODES OF HEAD, FACE AND NECK (H): Primary | ICD-10-CM

## 2025-01-08 DIAGNOSIS — C10.9 MALIGNANT NEOPLASM OF OROPHARYNX (H): ICD-10-CM

## 2025-01-08 DIAGNOSIS — C77.0 SECONDARY MALIGNANCY OF LYMPH NODES OF HEAD, FACE AND NECK (H): ICD-10-CM

## 2025-01-08 LAB
CREAT BLD-MCNC: 1.1 MG/DL (ref 0.7–1.3)
EGFRCR SERPLBLD CKD-EPI 2021: >60 ML/MIN/1.73M2

## 2025-01-08 PROCEDURE — 71260 CT THORAX DX C+: CPT | Performed by: RADIOLOGY

## 2025-01-08 PROCEDURE — 70491 CT SOFT TISSUE NECK W/DYE: CPT | Mod: GC | Performed by: RADIOLOGY

## 2025-01-08 PROCEDURE — 82565 ASSAY OF CREATININE: CPT | Performed by: PATHOLOGY

## 2025-01-08 RX ORDER — IOPAMIDOL 755 MG/ML
82 INJECTION, SOLUTION INTRAVASCULAR ONCE
Status: COMPLETED | OUTPATIENT
Start: 2025-01-08 | End: 2025-01-08

## 2025-01-08 RX ADMIN — IOPAMIDOL 82 ML: 755 INJECTION, SOLUTION INTRAVASCULAR at 09:34

## 2025-01-08 NOTE — LETTER
1/8/2025       RE: Flaco Willis III  [address] [place] [address]     Dear Colleague,    Thank you for referring your patient, Flaco Willis III, to the [facility] [place] at [facility]. Please see a copy of my visit note below.    .Dear Dr. Friedman:    I had the pleasure of seeing Flaco Willis III in follow-up today at the [facility].     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 He developed a left neck mass which was evaluated by his PCP and then referred to his local ENT. He had a CT neck performed on 11/16/2021 which showed a 2.5 x 2.0 cm neck mass. He had an U/S of the neck performed on 12/8/2021 which showed a 3.1 x 2.3 x 1.7 cm left neck mass.  He had an FNA of the neck mass on 12/8/2021 which showed p16+ SCC. He had a PET scan on 1/4/2022 which showed focal uptake in the left tonsil with no obvious CT correlate, 2 cm FDG avid left level IIA node, no distant disease. He saw medical oncology at [facility] and was recommended for radiation +/- chemotherapy, recommended against surgery due to the need for postoperative chemoradiation. He then transferred his care to the [place]. He saw Dr Friedman on 1/19/2022 who discussed various treatment options with him. He met with Dr Malone in January 2022 to discuss radiation. He decided to proceed with surgery. He was taken to the OR on 2/15/2022 for left radical tonsillectomy and left neck dissection (levels II-IV). Intraoperatively inferior tonsil margin was positive for SCC. A re-resection was performed. Final pathology demonstrated a p16+ SCC in the tonsil of 2.2 mm focus in the inferior margin, a 7 mm focus of SCC, DOI 1 mm in the inferior tonsil margin, and 2 mm of residual SCC in the re-resected inferior tonsil specimen, with closest margin 1.5 mm. There was a 2.6 cm  metastatic node in level IIA, no IMANI, 18 remaining nodes negative. He developed postoperative first bite syndrome, addressed with botox injections with Dr Shaquille Block.    Interval history:  He comes in today for follow-up. He was last seen in July 2024. He comes in with repeat scans. He says its been awhile since he had botox injections. He says insurance does not cover it. He gets a little of the first bite but the episodes are brief. He does not notice it much anymore, almost has to remind himself of it. He says his throat is doing ok. He has no problems with swallowing unless he really focus and thinks about it. He is less noticing the scar tissue in his throat that previously bothered him. He has no sore throat. He has not had any colds. He says his weight is maybe up a little. He has no ear pain. He has no neck masses.       MEDICATIONS:     Current Outpatient Medications   Medication Sig Dispense Refill     aspirin 81 MG tablet Take 81 mg by mouth       atorvastatin (LIPITOR) 20 MG tablet Take 20 mg by mouth daily       busPIRone (BUSPAR) 10 MG tablet Take 10 mg by mouth 2 times daily        Cholecalciferol (VITAMIN D3) 1000 UNITS CAPS Take 1,000 Units by mouth        finasteride (PROPECIA) 1 MG tablet Take 1 mg by mouth daily        melatonin 3 MG tablet Take 1 tablet (3 mg) by mouth nightly as needed for sleep 14 tablet 1     Omega-3 Fatty Acids (FISH OIL PO)        tadalafil (CIALIS) 20 MG tablet Take 20 mg by mouth       VITAMIN E MTC PO          ALLERGIES:    Allergies   Allergen Reactions     Amoxicillin-Pot Clavulanate Rash       HABITS/SOCIAL HISTORY:   Smoked on weekends previously several years ago. No chewing tobacco. Drinks beer on weekends.   .   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       Social History     Socioeconomic History     Marital status:      Spouse name: Not on file     Number of children: Not on file     Years of education: Not on file     Highest  education level: Not on file   Occupational History     Not on file   Tobacco Use     Smoking status: Former     Types: Cigars     Start date: 1973     Quit date: 1990     Years since quittin.0     Smokeless tobacco: Never   Substance and Sexual Activity     Alcohol use: Yes     Drug use: No     Sexual activity: Yes     Partners: Female     Birth control/protection: Pull-out method   Other Topics Concern     Not on file   Social History Narrative     Not on file     Social Drivers of Health     Financial Resource Strain: High Risk (2021)    [facility]    Financial Resource Strain      Difficulty of Paying Living Expenses: Not on file      Difficulty of Paying Living Expenses: Not on file   Food Insecurity: Not on file   Transportation Needs: Not on file   Physical Activity: Not on file   Stress: Not on file   Social Connections: Unknown (2021)    Received from [facility], [facility]    Social Connections      Frequency of Communication with Friends and Family: Not on file   Interpersonal Safety: Not on file   Housing Stability: Not on file       PAST MEDICAL HISTORY:   Past Medical History:   Diagnosis Date     ADD (attention deficit disorder)      Anxiety      Crohn's disease (H)      Hyperlipidemia      Hypertension      Liposarcoma of lower extremity (H)         PAST SURGICAL HISTORY:   Past Surgical History:   Procedure Laterality Date     DISSECTION RADICAL NECK MODIFIED Left 2/15/2022    Procedure: left modified radical neck dissection.;  Surgeon: Summer Valladares MD;  Location: [facility] OR     EXCISE SOFT TISSUE TUMOR THIGH  2013    Procedure: EXCISE SOFT TISSUE TUMOR THIGH;  Excision of Tumor Bed Left Medial Thigh;  Surgeon: Seymour Jesus MD;  Location: [facility] OR     HERNIA REPAIR       TONSILLECTOMY ADULT Left  "2/15/2022    Procedure: left radical tonsillectomy, nasogastric tube placement;  Surgeon: Summer Valladares MD;  Location: [facility] OR     tumor removal of thigh[       vocal cord surgery[       WRIST SURGERY         FAMILY HISTORY:  No family history on file.    REVIEW OF SYSTEMS:  12 point ROS was negative other than the symptoms noted above in the HPI.  Patient Supplied Answers to Review of Systems      6/21/2023    11:07 AM    ENT ROS   Constitutional Problems with sleep   Psychology Frequently feeling depressed or sad    Frequently feeling anxious   Ears, Nose, Throat Trouble swallowing         PHYSICAL EXAMINATION:   BP (!) 185/98 (BP Location: Right arm, Patient Position: Sitting, Cuff Size: Adult Regular)   Pulse 59   Ht 1.727 m (5' 8\")   Wt 79.4 kg (175 lb)   BMI 26.61 kg/m     Appearance:   normal; NAD, age-appropriate appearance, well-developed, normal habitus   Communication:   normal; communicates verbally, normal voice quality   Head/Face:   inspection -  Normal; no scars or visible lesions   Salivary glands -  Normal size, no tenderness, swelling, or palpable masses   Facial strength -  Normal and symmetric    Skin:  normal, no rash   Ears:  auricle (AD) -  Normal  EAC (AD) - cerumen  TM (AD) - normal  auricle (AS) -  Normal  EAC (AS) - normal  TM (AS) - normal  Normal clinical speech reception   Nose:  Ext. inspection -  Normal  Internal Inspection -  Normal mucosa, septum, and turbinates   Nasopharynx:  normal mucosa, no masses   Oral Cavity:  lips -  Normal mucosa, oral competence, and stoma size   Age-appropriate dentition, healthy gingival mucosa   Hard palate, buccal, floor of mouth mucosa normal   Tongue - normal movement, no lesions, posteriorly the tongue is mildly tethered to the RMT/tonsil defect on the left, no palpable masses, some scar tissue posteriorly on palpation   Oropharynx:  mucosa -  Normal, no lesions  soft palate -  Normal, no lesions, no asymmetry, normal elevation  tonsils " -  S/p left radical tonsillectomy with no masses palpable or on visual inspection, no mucosal lesions, only minimal scarring  Base of tongue - normal, no masses or mucosal lesions  Vallecula - clear   Hypopharynx:  Normal pyriform sinus and pharyngeal wall mucosa   No pooled secretions    Larynx:  Epiglottis, AE folds, false vocal cords, true vocal cords, arytenoids normal in appearance, bilaterally mobile cords    Neck: Well healed left neck incision  No palpable masses   Lymphatic:  no abnormal nodes   Cardiovascular:  warm, pink, well-perfused extremities without swelling, tenderness, or edema   Respiratory:  Normal respiratory effort, no stridor   Neuro/Psych.:  mood/affect -  normal  mental status -  normal       PROCEDURES:   Flexible fiberoptic laryngoscopy: Scope exam was indicated due to tonsil cancer. Verbal consent was obtained. The nasal cavity was prepped with an aerosolized solution of topical anesthetic and vasoconstrictive agent. The scope was passed through the anterior nasal cavity and advanced. Inspection of the nasopharynx revealed no gross abnormality. The base of tongue and vallecula are normal with the exception of the expected postoperative changes to the left base of tongue/tonsil without evidence of residual cancer. The epiglottis, AE folds, false cords, true cords, arytenoids are normal.  Inspection of the larynx revealed bilaterally mobile vocal cords. Pyriform sinuses are symmetric. The airway is patent. Procedure tolerated well with no immediate complications noted.                  RESULTS REVIEWED:   CT neck and chest imaging independently reviewed     CT chest reports reviewed       IMPRESSION AND PLAN:   Flaco Willis is a 69 year old man with a p16+ T1N1 SCC of the left tonsil. He underwent surgical resection with left radical tonsillectomy and left neck dissection on 2/15/2022. He was recommended for no further treatment.    He has no evidence of recurrent disease.     He has  first bite syndrome, symptoms improved and minimal. He had botox injections with Dr Shaquille Block in May 2024, will continue to follow-up with her with regards to need for botox.     Repeat imaging today. Will send neck CT results to [facility]. Pending final results repeat in 1 year.    I will see him back in 6 months.    Thank you very much for the opportunity to participate in the care of your patient.      Summer Valladares MD  Otolaryngology- Head & Neck Surgery      This note was dictated with voice recognition software and then edited. Please excuse any unintentional errors.             CC:  Ashok Friedman MD  Department of Medical Oncology  [facility]      Alcides Malone MD  Department of Radiation Oncology  [facility]      Again, thank you for allowing me to participate in the care of your patient.      Sincerely,    Summer Valladares MD

## 2025-01-08 NOTE — DISCHARGE INSTRUCTIONS

## 2025-07-02 NOTE — PROGRESS NOTES
.Dear Dr. Friedman:    I had the pleasure of seeing Flaco Willis III in follow-up today at the [facility].     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 He developed a left neck mass which was evaluated by his PCP and then referred to his local ENT. He had a CT neck performed on 11/16/2021 which showed a 2.5 x 2.0 cm neck mass. He had an U/S of the neck performed on 12/8/2021 which showed a 3.1 x 2.3 x 1.7 cm left neck mass.  He had an FNA of the neck mass on 12/8/2021 which showed p16+ SCC. He had a PET scan on 1/4/2022 which showed focal uptake in the left tonsil with no obvious CT correlate, 2 cm FDG avid left level IIA node, no distant disease. He saw medical oncology at [facility] and was recommended for radiation +/- chemotherapy, recommended against surgery due to the need for postoperative chemoradiation. He then transferred his care to the [facility]. He saw Dr Friedman on 1/19/2022 who discussed various treatment options with him. He met with Dr Malone in January 2022 to discuss radiation. He decided to proceed with surgery. He was taken to the OR on 2/15/2022 for left radical tonsillectomy and left neck dissection (levels II-IV). Intraoperatively inferior tonsil margin was positive for SCC. A re-resection was performed. Final pathology demonstrated a p16+ SCC in the tonsil of 2.2 mm focus in the inferior margin, a 7 mm focus of SCC, DOI 1 mm in the inferior tonsil margin, and 2 mm of residual SCC in the re-resected inferior tonsil specimen, with closest margin 1.5 mm. There was a 2.6 cm metastatic node in level IIA, no IMANI, 18 remaining nodes negative. He developed postoperative first bite syndrome, addressed with botox injections with Dr Shaquille Block.    Interval history:  He comes in today for follow-up. He was last seen in January 2025. He says things are going fine. He says things are similar as last visit.  He is still having first bite but it is minimal. It is brief with the episodes. He says he does not really think about it. He is not needing the botox injections anymore. He does not notice the scar tissue as much anymore. He notices it if he thinks about it. He does not have any problems swallowing. He does not feel any masses in the neck. He is eating a normal diet. He has occasional sticking but nothing particularly bothersome, just has to be patient with eating. It is not problematic with his life. He has no sore throat or pain with swallowing. He has no ear pain. His weight is consistent. He is seeing the dentist regularly with no concerns. He is seeing PCP for routine health maintenance.       MEDICATIONS:     Current Outpatient Medications   Medication Sig Dispense Refill    aspirin 81 MG tablet Take 81 mg by mouth      atorvastatin (LIPITOR) 20 MG tablet Take 20 mg by mouth daily      busPIRone (BUSPAR) 10 MG tablet Take 10 mg by mouth 2 times daily       Cholecalciferol (VITAMIN D3) 1000 UNITS CAPS Take 1,000 Units by mouth       finasteride (PROPECIA) 1 MG tablet Take 1 mg by mouth daily       melatonin 3 MG tablet Take 1 tablet (3 mg) by mouth nightly as needed for sleep 14 tablet 1    Omega-3 Fatty Acids (FISH OIL PO)       tadalafil (CIALIS) 20 MG tablet Take 20 mg by mouth      VITAMIN E MTC PO          ALLERGIES:    Allergies   Allergen Reactions    Amoxicillin-Pot Clavulanate Rash       HABITS/SOCIAL HISTORY:   Smoked on weekends previously several years ago. No chewing tobacco. Drinks beer on weekends.   .   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       Social History     Socioeconomic History    Marital status:      Spouse name: Not on file    Number of children: Not on file    Years of education: Not on file    Highest education level: Not on file   Occupational History    Not on file   Tobacco Use    Smoking status: Former     Types: Cigars     Start date:  1973     Quit date: 1990     Years since quittin.5    Smokeless tobacco: Never   Substance and Sexual Activity    Alcohol use: Yes    Drug use: No    Sexual activity: Yes     Partners: Female     Birth control/protection: Pull-out method   Other Topics Concern    Not on file   Social History Narrative    Not on file     Social Drivers of Health     Financial Resource Strain: High Risk (2021)    Received from [facility]    Financial Resource Strain     Difficulty of Paying Living Expenses: Not on file     Difficulty of Paying Living Expenses: Not on file   Food Insecurity: Not on file   Transportation Needs: Not on file   Physical Activity: Not on file   Stress: Not on file   Social Connections: Unknown (2021)    Received from [facility]    Social Connections     Frequency of Communication with Friends and Family: Not on file   Interpersonal Safety: Not on file   Housing Stability: Not on file       PAST MEDICAL HISTORY:   Past Medical History:   Diagnosis Date    ADD (attention deficit disorder)     Anxiety     Crohn's disease (H)     Hyperlipidemia     Hypertension     Liposarcoma of lower extremity (H)         PAST SURGICAL HISTORY:   Past Surgical History:   Procedure Laterality Date    DISSECTION RADICAL NECK MODIFIED Left 2/15/2022    Procedure: left modified radical neck dissection.;  Surgeon: Summer Valladares MD;  Location: [facility] OR    EXCISE SOFT TISSUE TUMOR THIGH  2013    Procedure: EXCISE SOFT TISSUE TUMOR THIGH;  Excision of Tumor Bed Left Medial Thigh;  Surgeon: Seymour Jesus MD;  Location: [facility] OR    HERNIA REPAIR      TONSILLECTOMY ADULT Left 2/15/2022    Procedure: left radical tonsillectomy, nasogastric tube placement;  Surgeon: Summer Valladares MD;  Location: [facility] OR    tumor removal of thigh[      vocal cord surgery[      WRIST SURGERY         FAMILY HISTORY:  No family history on  file.    REVIEW OF SYSTEMS:  12 point ROS was negative other than the symptoms noted above in the HPI.  Patient Supplied Answers to Review of Systems      6/21/2023    11:07 AM    ENT ROS   Constitutional Problems with sleep   Psychology Frequently feeling depressed or sad    Frequently feeling anxious   Ears, Nose, Throat Trouble swallowing         PHYSICAL EXAMINATION:   There were no vitals taken for this visit.   Appearance:   normal; NAD, age-appropriate appearance, well-developed, normal habitus   Communication:   normal; communicates verbally, normal voice quality   Head/Face:   inspection -  Normal; no scars or visible lesions   Salivary glands -  Normal size, no tenderness, swelling, or palpable masses   Facial strength -  Normal and symmetric    Skin:  normal, no rash   Ears:  auricle (AD) -  Normal  EAC (AD) - normal  TM (AD) - normal  auricle (AS) -  Normal  EAC (AS) - normal  TM (AS) - normal  Normal clinical speech reception   Nose:  Ext. inspection -  Normal  Internal Inspection -  Normal mucosa, septum, and turbinates   Nasopharynx:  normal mucosa, no masses   Oral Cavity:  lips -  Normal mucosa, oral competence, and stoma size   Age-appropriate dentition, healthy gingival mucosa   Hard palate, buccal, floor of mouth mucosa normal   Tongue - normal movement, no lesions, posteriorly the tongue is mildly tethered to the RMT/tonsil defect on the left, no palpable masses, some scar tissue posteriorly on palpation   Oropharynx:  mucosa -  Normal, no lesions  soft palate -  Normal, no lesions, no asymmetry, normal elevation  tonsils -  S/p left radical tonsillectomy with no masses palpable or on visual inspection, no mucosal lesions, only minimal scarring  Base of tongue - normal, no masses or mucosal lesions  Vallecula - clear   Hypopharynx:  Normal pyriform sinus and pharyngeal wall mucosa   No pooled secretions    Larynx:  Epiglottis, AE folds, false vocal cords, true vocal cords, arytenoids normal in  appearance, bilaterally mobile cords    Neck: Well healed left neck incision  No palpable masses   Lymphatic:  no abnormal nodes   Cardiovascular:  warm, pink, well-perfused extremities without swelling, tenderness, or edema   Respiratory:  Normal respiratory effort, no stridor   Neuro/Psych.:  mood/affect -  normal  mental status -  normal       PROCEDURES:   Flexible fiberoptic laryngoscopy: Scope exam was indicated due to tonsil cancer. Verbal consent was obtained. The nasal cavity was prepped with an aerosolized solution of topical anesthetic and vasoconstrictive agent. The scope was passed through the anterior nasal cavity and advanced. Inspection of the nasopharynx revealed no gross abnormality. The base of tongue and vallecula are normal with the exception of the expected postoperative changes to the left base of tongue/tonsil without evidence of residual cancer. The epiglottis, AE folds, false cords, true cords, arytenoids are normal.  Inspection of the larynx revealed bilaterally mobile vocal cords. Pyriform sinuses are symmetric. The airway is patent. Procedure tolerated well with no immediate complications noted.              RESULTS REVIEWED:         IMPRESSION AND PLAN:   Flaco Willis is a 70 year old man with a p16+ T1N1 SCC of the left tonsil. He underwent surgical resection with left radical tonsillectomy and left neck dissection on 2/15/2022. He was recommended for no further treatment.    He has no evidence of recurrent disease.     He has first bite syndrome, symptoms improved and minimal. He last had botox injections with Dr Shaquille Block in May 2024, does not feel the need for repeat at this time.     Repeat imaging in 6 months.    I will see him back in 6 months with scans.    Thank you very much for the opportunity to participate in the care of your patient.      Summer Valladares MD  Otolaryngology- Head & Neck Surgery      This note was dictated with voice recognition software and then edited.  Please excuse any unintentional errors.             CC:  Ashok Friedman MD  Department of Medical Oncology  [facility]      Alcides Malone MD  Department of Radiation Oncology  [facility]

## 2025-07-09 ENCOUNTER — OFFICE VISIT (OUTPATIENT)
Dept: OTOLARYNGOLOGY | Facility: CLINIC | Age: 70
End: 2025-07-09
Payer: COMMERCIAL

## 2025-07-09 VITALS — WEIGHT: 168 LBS | BODY MASS INDEX: 25.46 KG/M2 | HEIGHT: 68 IN

## 2025-07-09 DIAGNOSIS — C77.0 SECONDARY MALIGNANCY OF LYMPH NODES OF HEAD, FACE AND NECK (H): Primary | ICD-10-CM

## 2025-07-09 DIAGNOSIS — C10.9 MALIGNANT NEOPLASM OF OROPHARYNX (H): ICD-10-CM

## 2025-07-09 PROCEDURE — 31575 DIAGNOSTIC LARYNGOSCOPY: CPT | Performed by: OTOLARYNGOLOGY

## 2025-07-09 PROCEDURE — 99213 OFFICE O/P EST LOW 20 MIN: CPT | Mod: 25 | Performed by: OTOLARYNGOLOGY

## 2025-07-09 PROCEDURE — 1126F AMNT PAIN NOTED NONE PRSNT: CPT | Performed by: OTOLARYNGOLOGY

## 2025-07-09 ASSESSMENT — PAIN SCALES - GENERAL: PAINLEVEL_OUTOF10: NO PAIN (0)

## 2025-07-09 NOTE — LETTER
7/9/2025       RE: Flaco Willis III  [address] [place] [address]     Dear Colleague,    Thank you for referring your patient, Flaco Willis III, to the [facility] [place] at [facility]. Please see a copy of my visit note below.    .Dear Dr. Friedman:    I had the pleasure of seeing Flaco Willis III in follow-up today at the [facility].     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 He developed a left neck mass which was evaluated by his PCP and then referred to his local ENT. He had a CT neck performed on 11/16/2021 which showed a 2.5 x 2.0 cm neck mass. He had an U/S of the neck performed on 12/8/2021 which showed a 3.1 x 2.3 x 1.7 cm left neck mass.  He had an FNA of the neck mass on 12/8/2021 which showed p16+ SCC. He had a PET scan on 1/4/2022 which showed focal uptake in the left tonsil with no obvious CT correlate, 2 cm FDG avid left level IIA node, no distant disease. He saw medical oncology at [facility] and was recommended for radiation +/- chemotherapy, recommended against surgery due to the need for postoperative chemoradiation. He then transferred his care to the [facility]. He saw Dr Friedman on 1/19/2022 who discussed various treatment options with him. He met with Dr Malone in January 2022 to discuss radiation. He decided to proceed with surgery. He was taken to the OR on 2/15/2022 for left radical tonsillectomy and left neck dissection (levels II-IV). Intraoperatively inferior tonsil margin was positive for SCC. A re-resection was performed. Final pathology demonstrated a p16+ SCC in the tonsil of 2.2 mm focus in the inferior margin, a 7 mm focus of SCC, DOI 1 mm in the inferior tonsil margin, and 2 mm of residual SCC in the re-resected inferior tonsil specimen, with closest margin 1.5 mm. There was a 2.6 cm  metastatic node in level IIA, no IMANI, 18 remaining nodes negative. He developed postoperative first bite syndrome, addressed with botox injections with Dr Shaquille Block.    Interval history:  He comes in today for follow-up. He was last seen in January 2025. He says things are going fine. He says things are similar as last visit. He is still having first bite but it is minimal. It is brief with the episodes. He says he does not really think about it. He is not needing the botox injections anymore. He does not notice the scar tissue as much anymore. He notices it if he thinks about it. He does not have any problems swallowing. He does not feel any masses in the neck. He is eating a normal diet. He has occasional sticking but nothing particularly bothersome, just has to be patient with eating. It is not problematic with his life. He has no sore throat or pain with swallowing. He has no ear pain. His weight is consistent. He is seeing the dentist regularly with no concerns. He is seeing PCP for routine health maintenance.       MEDICATIONS:     Current Outpatient Medications   Medication Sig Dispense Refill     aspirin 81 MG tablet Take 81 mg by mouth       atorvastatin (LIPITOR) 20 MG tablet Take 20 mg by mouth daily       busPIRone (BUSPAR) 10 MG tablet Take 10 mg by mouth 2 times daily        Cholecalciferol (VITAMIN D3) 1000 UNITS CAPS Take 1,000 Units by mouth        finasteride (PROPECIA) 1 MG tablet Take 1 mg by mouth daily        melatonin 3 MG tablet Take 1 tablet (3 mg) by mouth nightly as needed for sleep 14 tablet 1     Omega-3 Fatty Acids (FISH OIL PO)        tadalafil (CIALIS) 20 MG tablet Take 20 mg by mouth       VITAMIN E MTC PO          ALLERGIES:    Allergies   Allergen Reactions     Amoxicillin-Pot Clavulanate Rash       HABITS/SOCIAL HISTORY:   Smoked on weekends previously several years ago. No chewing tobacco. Drinks beer on weekends.   .   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       Social History     Socioeconomic History     Marital status:      Spouse name: Not on file     Number of children: Not on file     Years of education: Not on file     Highest education level: Not on file   Occupational History     Not on file   Tobacco Use     Smoking status: Former     Types: Cigars     Start date: 1973     Quit date: 1990     Years since quittin.5     Smokeless tobacco: Never   Substance and Sexual Activity     Alcohol use: Yes     Drug use: No     Sexual activity: Yes     Partners: Female     Birth control/protection: Pull-out method   Other Topics Concern     Not on file   Social History Narrative     Not on file     Social Drivers of Health     Financial Resource Strain: High Risk (2021)    Received from [facility]    Financial Resource Strain      Difficulty of Paying Living Expenses: Not on file      Difficulty of Paying Living Expenses: Not on file   Food Insecurity: Not on file   Transportation Needs: Not on file   Physical Activity: Not on file   Stress: Not on file   Social Connections: Unknown (2021)    Received from [facility]    Social Connections      Frequency of Communication with Friends and Family: Not on file   Interpersonal Safety: Not on file   Housing Stability: Not on file       PAST MEDICAL HISTORY:   Past Medical History:   Diagnosis Date     ADD (attention deficit disorder)      Anxiety      Crohn's disease (H)      Hyperlipidemia      Hypertension      Liposarcoma of lower extremity (H)         PAST SURGICAL HISTORY:   Past Surgical History:   Procedure Laterality Date     DISSECTION RADICAL NECK MODIFIED Left 2/15/2022    Procedure: left modified radical neck dissection.;  Surgeon: Summer Valladares MD;  Location: [facility] OR     EXCISE SOFT TISSUE TUMOR THIGH  2013    Procedure: EXCISE SOFT TISSUE TUMOR THIGH;  Excision of Tumor Bed Left Medial Thigh;   Surgeon: Seymour Jesus MD;  Location: [facility] OR     HERNIA REPAIR       TONSILLECTOMY ADULT Left 2/15/2022    Procedure: left radical tonsillectomy, nasogastric tube placement;  Surgeon: Summer Valladares MD;  Location: [facility] OR     tumor removal of thigh[       vocal cord surgery[       WRIST SURGERY         FAMILY HISTORY:  No family history on file.    REVIEW OF SYSTEMS:  12 point ROS was negative other than the symptoms noted above in the HPI.  Patient Supplied Answers to Review of Systems      6/21/2023    11:07 AM    ENT ROS   Constitutional Problems with sleep   Psychology Frequently feeling depressed or sad    Frequently feeling anxious   Ears, Nose, Throat Trouble swallowing         PHYSICAL EXAMINATION:   There were no vitals taken for this visit.   Appearance:   normal; NAD, age-appropriate appearance, well-developed, normal habitus   Communication:   normal; communicates verbally, normal voice quality   Head/Face:   inspection -  Normal; no scars or visible lesions   Salivary glands -  Normal size, no tenderness, swelling, or palpable masses   Facial strength -  Normal and symmetric    Skin:  normal, no rash   Ears:  auricle (AD) -  Normal  EAC (AD) - normal  TM (AD) - normal  auricle (AS) -  Normal  EAC (AS) - normal  TM (AS) - normal  Normal clinical speech reception   Nose:  Ext. inspection -  Normal  Internal Inspection -  Normal mucosa, septum, and turbinates   Nasopharynx:  normal mucosa, no masses   Oral Cavity:  lips -  Normal mucosa, oral competence, and stoma size   Age-appropriate dentition, healthy gingival mucosa   Hard palate, buccal, floor of mouth mucosa normal   Tongue - normal movement, no lesions, posteriorly the tongue is mildly tethered to the RMT/tonsil defect on the left, no palpable masses, some scar tissue posteriorly on palpation   Oropharynx:  mucosa -  Normal, no lesions  soft palate -  Normal, no lesions, no asymmetry, normal elevation  tonsils -  S/p left radical  tonsillectomy with no masses palpable or on visual inspection, no mucosal lesions, only minimal scarring  Base of tongue - normal, no masses or mucosal lesions  Vallecula - clear   Hypopharynx:  Normal pyriform sinus and pharyngeal wall mucosa   No pooled secretions    Larynx:  Epiglottis, AE folds, false vocal cords, true vocal cords, arytenoids normal in appearance, bilaterally mobile cords    Neck: Well healed left neck incision  No palpable masses   Lymphatic:  no abnormal nodes   Cardiovascular:  warm, pink, well-perfused extremities without swelling, tenderness, or edema   Respiratory:  Normal respiratory effort, no stridor   Neuro/Psych.:  mood/affect -  normal  mental status -  normal       PROCEDURES:   Flexible fiberoptic laryngoscopy: Scope exam was indicated due to tonsil cancer. Verbal consent was obtained. The nasal cavity was prepped with an aerosolized solution of topical anesthetic and vasoconstrictive agent. The scope was passed through the anterior nasal cavity and advanced. Inspection of the nasopharynx revealed no gross abnormality. The base of tongue and vallecula are normal with the exception of the expected postoperative changes to the left base of tongue/tonsil without evidence of residual cancer. The epiglottis, AE folds, false cords, true cords, arytenoids are normal.  Inspection of the larynx revealed bilaterally mobile vocal cords. Pyriform sinuses are symmetric. The airway is patent. Procedure tolerated well with no immediate complications noted.              RESULTS REVIEWED:         IMPRESSION AND PLAN:   Flaco Willis is a 70 year old man with a p16+ T1N1 SCC of the left tonsil. He underwent surgical resection with left radical tonsillectomy and left neck dissection on 2/15/2022. He was recommended for no further treatment.    He has no evidence of recurrent disease.     He has first bite syndrome, symptoms improved and minimal. He last had botox injections with Dr Shaquille Block in May  2024, does not feel the need for repeat at this time.     Repeat imaging in 6 months.    I will see him back in 6 months with scans.    Thank you very much for the opportunity to participate in the care of your patient.      Summer Valladares MD  Otolaryngology- Head & Neck Surgery      This note was dictated with voice recognition software and then edited. Please excuse any unintentional errors.             CC:  Ashok Friedman MD  Department of Medical Oncology  [facility]      Alcides Malone MD  Department of Radiation Oncology  [facility]    Again, thank you for allowing me to participate in the care of your patient.      Sincerely,    Summer Valladares MD

## (undated) DEVICE — ESU ELEC BLADE 6" COATED/INSULATED E1455-6

## (undated) DEVICE — ESU CORD BIPOLAR GREEN 10-4000

## (undated) DEVICE — CATH TRAY FOLEY SURESTEP 16FR W/TMP PRB STLK LATEX A319416AM

## (undated) DEVICE — CLIP HORIZON MED BLUE 002200

## (undated) DEVICE — SU SILK 2-0 SH CR 5X18" C0125

## (undated) DEVICE — GLOVE PROTEXIS MICRO 6.0  2D73PM60

## (undated) DEVICE — SYRINGE MNJCT 12ML LF STRL LL TIP MED PP DISP

## (undated) DEVICE — DRAIN JACKSON PRATT 10MM FLAT 4/4 PERF SU130-1311

## (undated) DEVICE — LINEN TOWEL PACK X5 5464

## (undated) DEVICE — PACK NEURO MINOR UMMC SNE32MNMU4

## (undated) DEVICE — PREP POVIDONE IODINE SOLUTION 10% 4OZ

## (undated) DEVICE — SU PROLENE 5-0 PS-3 18" 8681G

## (undated) DEVICE — RETR ELASTIC STAYS LONE STAR BLUNT DUAL LEAD 3550-1G

## (undated) DEVICE — CLIP HORIZON SM RED WIDE SLOT 001201

## (undated) DEVICE — Device

## (undated) DEVICE — TUBE NASOGASTRIC FEEDING ENTRIFLEX ENFIT 12FR 43 8884721252E

## (undated) DEVICE — DRSG TELFA 3X8" 1238

## (undated) DEVICE — ESU PENCIL SMOKE EVAC W/ROCKER SWITCH 0703-047-000

## (undated) DEVICE — SU SILK 3-0 TIE 12X30" A304H

## (undated) DEVICE — SU VICRYL 3-0 SH 8X18" UND J864D

## (undated) DEVICE — LINEN GOWN LG 5406

## (undated) DEVICE — DRSG TEGADERM 4X4 3/4" 1626W

## (undated) DEVICE — SYR 10ML FINGER CONTROL W/O NDL 309695

## (undated) DEVICE — PREP SKIN SCRUB TRAY 4461A

## (undated) DEVICE — SOL NACL 0.9% IRRIG 1000ML BOTTLE 2F7124

## (undated) DEVICE — SUCTION MANIFOLD NEPTUNE 2 SYS 4 PORT 0702-020-000

## (undated) DEVICE — RX BACITRACIN OINTMENT 0.9G 1/32OZ CUR001109

## (undated) DEVICE — SU ETHILON 3-0 PS-1 18" 1663H

## (undated) DEVICE — LABEL MEDICATION SYSTEM 3303-P

## (undated) DEVICE — DRAIN JACKSON PRATT RESERVOIR 100ML SU130-1305

## (undated) DEVICE — ESU GROUND PAD ADULT W/CORD E7507

## (undated) DEVICE — SU SILK 0 TIE 6X30" A306H

## (undated) DEVICE — STRAP UNIVERSAL POSITIONING 2-PIECE 4X47X76" 91-287

## (undated) DEVICE — SU SILK 2-0 TIE 12X30" A305H

## (undated) DEVICE — ESU ELEC BLADE 2.75" COATED/INSULATED E1455

## (undated) RX ORDER — LIDOCAINE HYDROCHLORIDE AND EPINEPHRINE 10; 10 MG/ML; UG/ML
INJECTION, SOLUTION INFILTRATION; PERINEURAL
Status: DISPENSED
Start: 2022-02-15

## (undated) RX ORDER — OXYCODONE HYDROCHLORIDE 10 MG/1
TABLET ORAL
Status: DISPENSED
Start: 2022-02-15

## (undated) RX ORDER — CHLORHEXIDINE GLUCONATE ORAL RINSE 1.2 MG/ML
SOLUTION DENTAL
Status: DISPENSED
Start: 2022-02-15

## (undated) RX ORDER — SODIUM CHLORIDE, SODIUM LACTATE, POTASSIUM CHLORIDE, CALCIUM CHLORIDE 600; 310; 30; 20 MG/100ML; MG/100ML; MG/100ML; MG/100ML
INJECTION, SOLUTION INTRAVENOUS
Status: DISPENSED
Start: 2022-02-15

## (undated) RX ORDER — HYDROMORPHONE HYDROCHLORIDE 1 MG/ML
INJECTION, SOLUTION INTRAMUSCULAR; INTRAVENOUS; SUBCUTANEOUS
Status: DISPENSED
Start: 2022-02-15

## (undated) RX ORDER — FENTANYL CITRATE 50 UG/ML
INJECTION, SOLUTION INTRAMUSCULAR; INTRAVENOUS
Status: DISPENSED
Start: 2022-02-15

## (undated) RX ORDER — ACETAMINOPHEN 325 MG/1
TABLET ORAL
Status: DISPENSED
Start: 2022-02-15

## (undated) RX ORDER — LABETALOL HYDROCHLORIDE 5 MG/ML
INJECTION, SOLUTION INTRAVENOUS
Status: DISPENSED
Start: 2022-02-15

## (undated) RX ORDER — PROPOFOL 10 MG/ML
INJECTION, EMULSION INTRAVENOUS
Status: DISPENSED
Start: 2022-02-15

## (undated) RX ORDER — HYDROMORPHONE HCL IN WATER/PF 6 MG/30 ML
PATIENT CONTROLLED ANALGESIA SYRINGE INTRAVENOUS
Status: DISPENSED
Start: 2022-02-15

## (undated) RX ORDER — CLINDAMYCIN PHOSPHATE 900 MG/50ML
INJECTION, SOLUTION INTRAVENOUS
Status: DISPENSED
Start: 2022-02-15